# Patient Record
Sex: FEMALE | Race: WHITE | NOT HISPANIC OR LATINO | Employment: OTHER | URBAN - METROPOLITAN AREA
[De-identification: names, ages, dates, MRNs, and addresses within clinical notes are randomized per-mention and may not be internally consistent; named-entity substitution may affect disease eponyms.]

---

## 2019-04-30 ENCOUNTER — HOSPITAL ENCOUNTER (OUTPATIENT)
Dept: LAB | Facility: HOSPITAL | Age: 64
Discharge: HOME | End: 2019-04-30
Attending: SURGERY | Admitting: SURGERY

## 2019-04-30 LAB
ALBUMIN SERPL-MCNC: 4.1 G/DL (ref 3.5–4.8)
ALBUMIN/GLOB SERPL: 1.4 {RATIO} (ref 1–1.7)
ALP SERPL-CCNC: 71 IU/L (ref 32–91)
ALT SERPL-CCNC: 18 IU/L (ref 14–54)
ANION GAP SERPL CALC-SCNC: 16.6 MMOL/L (ref 10–20)
AST SERPL-CCNC: 23 IU/L (ref 15–41)
BASOPHILS # BLD AUTO: 0.2 10*3/UL (ref 0–0.2)
BASOPHILS NFR BLD AUTO: 2 % (ref 0–2)
BILIRUB SERPL-MCNC: 0.6 MG/DL (ref 0.3–1.2)
BUN SERPL-MCNC: 10 MG/DL (ref 8–20)
BUN/CREAT SERPL: 16.7 (ref 5.4–26.2)
CALCIUM SERPL-MCNC: 9.8 MG/DL (ref 8.9–10.3)
CHLORIDE SERPL-SCNC: 104 MMOL/L (ref 101–111)
CHOLEST SERPL-MCNC: 234 MG/DL
CHOLEST/HDLC SERPL: 3.1 {RATIO}
CONV CO2: 23 MMOL/L (ref 22–32)
CONV LDL CHOLESTEROL DIRECT: 137 MG/DL (ref 0–100)
CONV TOTAL PROTEIN: 7.1 G/DL (ref 6.1–7.9)
CREAT UR-MCNC: 0.6 MG/DL (ref 0.4–1)
DIFFERENTIAL METHOD BLD: (no result)
EOSINOPHIL # BLD AUTO: 0.2 10*3/UL (ref 0–0.3)
EOSINOPHIL # BLD AUTO: 3 % (ref 0–3)
ERYTHROCYTE [DISTWIDTH] IN BLOOD BY AUTOMATED COUNT: 13.8 % (ref 11.5–14.5)
GLOBULIN UR ELPH-MCNC: 3 G/DL (ref 2.5–3.8)
GLUCOSE SERPL-MCNC: 95 MG/DL (ref 65–99)
HCT VFR BLD AUTO: 46.8 % (ref 35–49)
HDLC SERPL-MCNC: 75 MG/DL
HGB BLD-MCNC: 15.5 G/DL (ref 12–15)
LDLC/HDLC SERPL: 1.8 {RATIO}
LIPID INTERPRETATION: ABNORMAL
LYMPHOCYTES # BLD AUTO: 2.4 10*3/UL (ref 0.8–4.8)
LYMPHOCYTES NFR BLD AUTO: 31 % (ref 18–42)
MCH RBC QN AUTO: 27.9 PG (ref 26–32)
MCHC RBC AUTO-ENTMCNC: 33 G/DL (ref 32–36)
MCV RBC AUTO: 84.4 FL (ref 80–94)
MONOCYTES # BLD AUTO: 0.7 10*3/UL (ref 0.1–1.3)
MONOCYTES NFR BLD AUTO: 9 % (ref 2–11)
NEUTROPHILS # BLD AUTO: 4.4 10*3/UL (ref 2.3–8.6)
NEUTROPHILS NFR BLD AUTO: 55 % (ref 50–75)
NRBC BLD AUTO-RTO: 0 /100{WBCS}
NRBC/RBC NFR BLD MANUAL: 0 10*3/UL
PLATELET # BLD AUTO: 314 10*3/UL (ref 150–450)
PMV BLD AUTO: 8.9 FL (ref 7.4–10.4)
POTASSIUM SERPL-SCNC: 3.6 MMOL/L (ref 3.6–5.1)
RBC # BLD AUTO: 5.54 10*6/UL (ref 4–5.4)
SODIUM SERPL-SCNC: 140 MMOL/L (ref 136–144)
TRIGL SERPL-MCNC: 90 MG/DL
VLDLC SERPL CALC-MCNC: 22.2 MG/DL
WBC # BLD AUTO: 8 10*3/UL (ref 4.5–11.5)

## 2021-11-22 ENCOUNTER — TRANSCRIBE ORDERS (OUTPATIENT)
Dept: ADMINISTRATIVE | Facility: HOSPITAL | Age: 66
End: 2021-11-22

## 2021-11-22 ENCOUNTER — LAB (OUTPATIENT)
Dept: LAB | Facility: HOSPITAL | Age: 66
End: 2021-11-22

## 2021-11-22 DIAGNOSIS — Z11.52 ENCOUNTER FOR SCREENING FOR SEVERE ACUTE RESPIRATORY SYNDROME CORONAVIRUS 2 (SARS-COV-2) INFECTION: Primary | ICD-10-CM

## 2021-11-22 PROCEDURE — U0004 COV-19 TEST NON-CDC HGH THRU: HCPCS

## 2021-11-22 PROCEDURE — C9803 HOPD COVID-19 SPEC COLLECT: HCPCS

## 2021-11-23 LAB — SARS-COV-2 ORF1AB RESP QL NAA+PROBE: DETECTED

## 2024-12-28 ENCOUNTER — APPOINTMENT (OUTPATIENT)
Dept: CARDIOLOGY | Facility: HOSPITAL | Age: 69
End: 2024-12-28
Payer: MEDICARE

## 2024-12-28 ENCOUNTER — APPOINTMENT (OUTPATIENT)
Dept: GENERAL RADIOLOGY | Facility: HOSPITAL | Age: 69
End: 2024-12-28
Payer: MEDICARE

## 2024-12-28 ENCOUNTER — HOSPITAL ENCOUNTER (INPATIENT)
Facility: HOSPITAL | Age: 69
LOS: 3 days | Discharge: HOME | End: 2025-01-01
Attending: EMERGENCY MEDICINE | Admitting: INTERNAL MEDICINE
Payer: MEDICARE

## 2024-12-28 DIAGNOSIS — J18.9 MULTIFOCAL PNEUMONIA: ICD-10-CM

## 2024-12-28 DIAGNOSIS — I50.31 ACUTE DIASTOLIC CONGESTIVE HEART FAILURE: ICD-10-CM

## 2024-12-28 DIAGNOSIS — I50.9 ACUTE CONGESTIVE HEART FAILURE, UNSPECIFIED HEART FAILURE TYPE: Primary | ICD-10-CM

## 2024-12-28 DIAGNOSIS — I44.7 LBBB (LEFT BUNDLE BRANCH BLOCK): ICD-10-CM

## 2024-12-28 LAB
ALBUMIN SERPL-MCNC: 4.3 G/DL (ref 3.5–5.2)
ALBUMIN/GLOB SERPL: 1.6 G/DL
ALP SERPL-CCNC: 91 U/L (ref 39–117)
ALT SERPL W P-5'-P-CCNC: 14 U/L (ref 1–33)
ANION GAP SERPL CALCULATED.3IONS-SCNC: 10.3 MMOL/L (ref 5–15)
AORTIC DIMENSIONLESS INDEX: 0.19 (DI)
AST SERPL-CCNC: 18 U/L (ref 1–32)
AV MEAN PRESS GRAD SYS DOP V1V2: 45 MMHG
AV VMAX SYS DOP: 447 CM/SEC
BASOPHILS # BLD AUTO: 0.06 10*3/MM3 (ref 0–0.2)
BASOPHILS NFR BLD AUTO: 0.5 % (ref 0–1.5)
BH CV ECHO LEFT VENTRICLE GLOBAL LONGITUDINAL STRAIN: -9.7 %
BH CV ECHO MEAS - ACS: 0.3 CM
BH CV ECHO MEAS - AO MAX PG: 79.9 MMHG
BH CV ECHO MEAS - AO V2 VTI: 87.1 CM
BH CV ECHO MEAS - AVA(I,D): 0.44 CM2
BH CV ECHO MEAS - EDV(CUBED): 132.7 ML
BH CV ECHO MEAS - EDV(MOD-SP2): 91.5 ML
BH CV ECHO MEAS - EDV(MOD-SP4): 79.3 ML
BH CV ECHO MEAS - EF(MOD-SP2): 41.9 %
BH CV ECHO MEAS - EF(MOD-SP4): 40.5 %
BH CV ECHO MEAS - ESV(CUBED): 59.3 ML
BH CV ECHO MEAS - ESV(MOD-SP2): 53.2 ML
BH CV ECHO MEAS - ESV(MOD-SP4): 47.2 ML
BH CV ECHO MEAS - FS: 23.5 %
BH CV ECHO MEAS - IVS/LVPW: 1 CM
BH CV ECHO MEAS - IVSD: 0.8 CM
BH CV ECHO MEAS - LA DIMENSION: 4.3 CM
BH CV ECHO MEAS - LAT PEAK E' VEL: 7.8 CM/SEC
BH CV ECHO MEAS - LV DIASTOLIC VOL/BSA (35-75): 49.7 CM2
BH CV ECHO MEAS - LV MASS(C)D: 140.5 GRAMS
BH CV ECHO MEAS - LV MAX PG: 3.2 MMHG
BH CV ECHO MEAS - LV MEAN PG: 2 MMHG
BH CV ECHO MEAS - LV SYSTOLIC VOL/BSA (12-30): 29.6 CM2
BH CV ECHO MEAS - LV V1 MAX: 88.8 CM/SEC
BH CV ECHO MEAS - LV V1 VTI: 19.1 CM
BH CV ECHO MEAS - LVIDD: 5.1 CM
BH CV ECHO MEAS - LVIDS: 3.9 CM
BH CV ECHO MEAS - LVOT AREA: 2.01 CM2
BH CV ECHO MEAS - LVOT DIAM: 1.6 CM
BH CV ECHO MEAS - LVPWD: 0.8 CM
BH CV ECHO MEAS - MED PEAK E' VEL: 4.6 CM/SEC
BH CV ECHO MEAS - MR MAX PG: 104.4 MMHG
BH CV ECHO MEAS - MR MAX VEL: 511 CM/SEC
BH CV ECHO MEAS - MV A MAX VEL: 104 CM/SEC
BH CV ECHO MEAS - MV DEC SLOPE: 569 CM/SEC2
BH CV ECHO MEAS - MV DEC TIME: 0.12 SEC
BH CV ECHO MEAS - MV E MAX VEL: 134 CM/SEC
BH CV ECHO MEAS - MV E/A: 1.29
BH CV ECHO MEAS - MV MAX PG: 5 MMHG
BH CV ECHO MEAS - MV MEAN PG: 3 MMHG
BH CV ECHO MEAS - MV P1/2T: 61.3 MSEC
BH CV ECHO MEAS - MV V2 VTI: 25.3 CM
BH CV ECHO MEAS - MVA(P1/2T): 3.6 CM2
BH CV ECHO MEAS - MVA(VTI): 1.52 CM2
BH CV ECHO MEAS - PA V2 MAX: 106 CM/SEC
BH CV ECHO MEAS - RAP SYSTOLE: 3 MMHG
BH CV ECHO MEAS - RV MAX PG: 2.5 MMHG
BH CV ECHO MEAS - RV V1 MAX: 79 CM/SEC
BH CV ECHO MEAS - RV V1 VTI: 15.2 CM
BH CV ECHO MEAS - RVSP: 54 MMHG
BH CV ECHO MEAS - SV(LVOT): 38.4 ML
BH CV ECHO MEAS - SV(MOD-SP2): 38.3 ML
BH CV ECHO MEAS - SV(MOD-SP4): 32.1 ML
BH CV ECHO MEAS - SVI(LVOT): 24.1 ML/M2
BH CV ECHO MEAS - SVI(MOD-SP2): 24 ML/M2
BH CV ECHO MEAS - SVI(MOD-SP4): 20.1 ML/M2
BH CV ECHO MEAS - TAPSE (>1.6): 1.99 CM
BH CV ECHO MEAS - TR MAX PG: 51 MMHG
BH CV ECHO MEAS - TR MAX VEL: 357 CM/SEC
BH CV ECHO MEASUREMENTS AVERAGE E/E' RATIO: 21.61
BH CV XLRA - RV BASE: 2.8 CM
BH CV XLRA - RV LENGTH: 6.5 CM
BH CV XLRA - RV MID: 2 CM
BH CV XLRA - TDI S': 10.2 CM/SEC
BILIRUB SERPL-MCNC: 0.6 MG/DL (ref 0–1.2)
BUN SERPL-MCNC: 11 MG/DL (ref 8–23)
BUN/CREAT SERPL: 18.6 (ref 7–25)
CALCIUM SPEC-SCNC: 10.4 MG/DL (ref 8.6–10.5)
CHLORIDE SERPL-SCNC: 103 MMOL/L (ref 98–107)
CHOLEST SERPL-MCNC: 248 MG/DL (ref 0–200)
CO2 SERPL-SCNC: 23.7 MMOL/L (ref 22–29)
CREAT SERPL-MCNC: 0.59 MG/DL (ref 0.57–1)
D DIMER PPP FEU-MCNC: 1.18 MCGFEU/ML (ref 0–0.68)
D-LACTATE SERPL-SCNC: 1 MMOL/L (ref 0.5–2)
DEPRECATED RDW RBC AUTO: 42.3 FL (ref 37–54)
EGFRCR SERPLBLD CKD-EPI 2021: 98.3 ML/MIN/1.73
EOSINOPHIL # BLD AUTO: 0.16 10*3/MM3 (ref 0–0.4)
EOSINOPHIL NFR BLD AUTO: 1.4 % (ref 0.3–6.2)
ERYTHROCYTE [DISTWIDTH] IN BLOOD BY AUTOMATED COUNT: 13.7 % (ref 12.3–15.4)
FLUAV SUBTYP SPEC NAA+PROBE: NOT DETECTED
FLUBV RNA ISLT QL NAA+PROBE: NOT DETECTED
GEN 5 1HR TROPONIN T REFLEX: 17 NG/L
GLOBULIN UR ELPH-MCNC: 2.7 GM/DL
GLUCOSE SERPL-MCNC: 119 MG/DL (ref 65–99)
HBA1C MFR BLD: 5.41 % (ref 4.8–5.6)
HCT VFR BLD AUTO: 42.5 % (ref 34–46.6)
HDLC SERPL-MCNC: 99 MG/DL (ref 40–60)
HGB BLD-MCNC: 13.8 G/DL (ref 12–15.9)
IMM GRANULOCYTES # BLD AUTO: 0.02 10*3/MM3 (ref 0–0.05)
IMM GRANULOCYTES NFR BLD AUTO: 0.2 % (ref 0–0.5)
LDLC SERPL CALC-MCNC: 134 MG/DL (ref 0–100)
LDLC/HDLC SERPL: 1.33 {RATIO}
LEFT ATRIUM VOLUME INDEX: 47.2 ML/M2
LV EF 3D SEGMENTATION: 46 %
LYMPHOCYTES # BLD AUTO: 1.69 10*3/MM3 (ref 0.7–3.1)
LYMPHOCYTES NFR BLD AUTO: 14.4 % (ref 19.6–45.3)
MCH RBC QN AUTO: 27.1 PG (ref 26.6–33)
MCHC RBC AUTO-ENTMCNC: 32.5 G/DL (ref 31.5–35.7)
MCV RBC AUTO: 83.5 FL (ref 79–97)
MONOCYTES # BLD AUTO: 0.87 10*3/MM3 (ref 0.1–0.9)
MONOCYTES NFR BLD AUTO: 7.4 % (ref 5–12)
NEUTROPHILS NFR BLD AUTO: 76.1 % (ref 42.7–76)
NEUTROPHILS NFR BLD AUTO: 8.91 10*3/MM3 (ref 1.7–7)
NT-PROBNP SERPL-MCNC: 3139 PG/ML (ref 0–900)
PLATELET # BLD AUTO: 274 10*3/MM3 (ref 140–450)
PMV BLD AUTO: 10.3 FL (ref 6–12)
POTASSIUM SERPL-SCNC: 3.5 MMOL/L (ref 3.5–5.2)
PROT SERPL-MCNC: 7 G/DL (ref 6–8.5)
QT INTERVAL: 386 MS
QT INTERVAL: 395 MS
QT INTERVAL: 396 MS
QTC INTERVAL: 490 MS
QTC INTERVAL: 505 MS
QTC INTERVAL: 513 MS
RBC # BLD AUTO: 5.09 10*6/MM3 (ref 3.77–5.28)
SARS-COV-2 RNA RESP QL NAA+PROBE: NOT DETECTED
SINUS: 2.7 CM
SODIUM SERPL-SCNC: 137 MMOL/L (ref 136–145)
STJ: 2.5 CM
TRIGL SERPL-MCNC: 87 MG/DL (ref 0–150)
TROPONIN T % DELTA: -11 %
TROPONIN T NUMERIC DELTA: -2 NG/L
TROPONIN T SERPL HS-MCNC: 16 NG/L
TROPONIN T SERPL HS-MCNC: 19 NG/L
TSH SERPL DL<=0.05 MIU/L-ACNC: 1.32 UIU/ML (ref 0.27–4.2)
VLDLC SERPL-MCNC: 15 MG/DL (ref 5–40)
WBC NRBC COR # BLD AUTO: 11.71 10*3/MM3 (ref 3.4–10.8)

## 2024-12-28 PROCEDURE — 84484 ASSAY OF TROPONIN QUANT: CPT | Performed by: EMERGENCY MEDICINE

## 2024-12-28 PROCEDURE — 99285 EMERGENCY DEPT VISIT HI MDM: CPT | Performed by: EMERGENCY MEDICINE

## 2024-12-28 PROCEDURE — 99285 EMERGENCY DEPT VISIT HI MDM: CPT

## 2024-12-28 PROCEDURE — G0378 HOSPITAL OBSERVATION PER HR: HCPCS

## 2024-12-28 PROCEDURE — 87636 SARSCOV2 & INF A&B AMP PRB: CPT | Performed by: EMERGENCY MEDICINE

## 2024-12-28 PROCEDURE — 84443 ASSAY THYROID STIM HORMONE: CPT | Performed by: NURSE PRACTITIONER

## 2024-12-28 PROCEDURE — 25010000002 METHYLPREDNISOLONE PER 125 MG: Performed by: NURSE PRACTITIONER

## 2024-12-28 PROCEDURE — 99204 OFFICE O/P NEW MOD 45 MIN: CPT | Performed by: INTERNAL MEDICINE

## 2024-12-28 PROCEDURE — 83036 HEMOGLOBIN GLYCOSYLATED A1C: CPT | Performed by: NURSE PRACTITIONER

## 2024-12-28 PROCEDURE — 87040 BLOOD CULTURE FOR BACTERIA: CPT | Performed by: EMERGENCY MEDICINE

## 2024-12-28 PROCEDURE — 25010000002 CEFTRIAXONE PER 250 MG: Performed by: EMERGENCY MEDICINE

## 2024-12-28 PROCEDURE — 84484 ASSAY OF TROPONIN QUANT: CPT | Performed by: NURSE PRACTITIONER

## 2024-12-28 PROCEDURE — 83880 ASSAY OF NATRIURETIC PEPTIDE: CPT | Performed by: EMERGENCY MEDICINE

## 2024-12-28 PROCEDURE — 80061 LIPID PANEL: CPT | Performed by: NURSE PRACTITIONER

## 2024-12-28 PROCEDURE — 93005 ELECTROCARDIOGRAM TRACING: CPT | Performed by: EMERGENCY MEDICINE

## 2024-12-28 PROCEDURE — 80053 COMPREHEN METABOLIC PANEL: CPT | Performed by: EMERGENCY MEDICINE

## 2024-12-28 PROCEDURE — 83605 ASSAY OF LACTIC ACID: CPT | Performed by: EMERGENCY MEDICINE

## 2024-12-28 PROCEDURE — 93306 TTE W/DOPPLER COMPLETE: CPT | Performed by: INTERNAL MEDICINE

## 2024-12-28 PROCEDURE — 85379 FIBRIN DEGRADATION QUANT: CPT | Performed by: NURSE PRACTITIONER

## 2024-12-28 PROCEDURE — 36415 COLL VENOUS BLD VENIPUNCTURE: CPT

## 2024-12-28 PROCEDURE — 93010 ELECTROCARDIOGRAM REPORT: CPT | Performed by: INTERNAL MEDICINE

## 2024-12-28 PROCEDURE — 93356 MYOCRD STRAIN IMG SPCKL TRCK: CPT | Performed by: INTERNAL MEDICINE

## 2024-12-28 PROCEDURE — 93005 ELECTROCARDIOGRAM TRACING: CPT | Performed by: FAMILY MEDICINE

## 2024-12-28 PROCEDURE — 85025 COMPLETE CBC W/AUTO DIFF WBC: CPT | Performed by: EMERGENCY MEDICINE

## 2024-12-28 PROCEDURE — 25010000002 ENOXAPARIN PER 10 MG: Performed by: INTERNAL MEDICINE

## 2024-12-28 PROCEDURE — 71046 X-RAY EXAM CHEST 2 VIEWS: CPT

## 2024-12-28 PROCEDURE — 93356 MYOCRD STRAIN IMG SPCKL TRCK: CPT

## 2024-12-28 PROCEDURE — 93306 TTE W/DOPPLER COMPLETE: CPT

## 2024-12-28 PROCEDURE — 25010000002 FUROSEMIDE PER 20 MG: Performed by: NURSE PRACTITIONER

## 2024-12-28 RX ORDER — POLYETHYLENE GLYCOL 3350 17 G/17G
17 POWDER, FOR SOLUTION ORAL DAILY PRN
Status: DISCONTINUED | OUTPATIENT
Start: 2024-12-28 | End: 2025-01-01 | Stop reason: HOSPADM

## 2024-12-28 RX ORDER — ENOXAPARIN SODIUM 100 MG/ML
40 INJECTION SUBCUTANEOUS
Status: DISCONTINUED | OUTPATIENT
Start: 2024-12-28 | End: 2024-12-30

## 2024-12-28 RX ORDER — BISACODYL 5 MG/1
5 TABLET, DELAYED RELEASE ORAL DAILY PRN
Status: DISCONTINUED | OUTPATIENT
Start: 2024-12-28 | End: 2025-01-01 | Stop reason: HOSPADM

## 2024-12-28 RX ORDER — SODIUM CHLORIDE 0.9 % (FLUSH) 0.9 %
10 SYRINGE (ML) INJECTION AS NEEDED
Status: DISCONTINUED | OUTPATIENT
Start: 2024-12-28 | End: 2025-01-01 | Stop reason: HOSPADM

## 2024-12-28 RX ORDER — SODIUM CHLORIDE 0.9 % (FLUSH) 0.9 %
10 SYRINGE (ML) INJECTION EVERY 12 HOURS SCHEDULED
Status: DISCONTINUED | OUTPATIENT
Start: 2024-12-28 | End: 2025-01-01 | Stop reason: HOSPADM

## 2024-12-28 RX ORDER — SODIUM CHLORIDE 9 MG/ML
40 INJECTION, SOLUTION INTRAVENOUS AS NEEDED
Status: DISCONTINUED | OUTPATIENT
Start: 2024-12-28 | End: 2025-01-01 | Stop reason: HOSPADM

## 2024-12-28 RX ORDER — METHYLPREDNISOLONE SODIUM SUCCINATE 125 MG/2ML
60 INJECTION, POWDER, LYOPHILIZED, FOR SOLUTION INTRAMUSCULAR; INTRAVENOUS DAILY
Status: DISCONTINUED | OUTPATIENT
Start: 2024-12-28 | End: 2024-12-29

## 2024-12-28 RX ORDER — FUROSEMIDE 10 MG/ML
40 INJECTION INTRAMUSCULAR; INTRAVENOUS DAILY
Status: DISCONTINUED | OUTPATIENT
Start: 2024-12-29 | End: 2025-01-01 | Stop reason: HOSPADM

## 2024-12-28 RX ORDER — ATORVASTATIN CALCIUM 20 MG/1
20 TABLET, FILM COATED ORAL NIGHTLY
Status: DISCONTINUED | OUTPATIENT
Start: 2024-12-28 | End: 2025-01-01 | Stop reason: HOSPADM

## 2024-12-28 RX ORDER — AMOXICILLIN 250 MG
2 CAPSULE ORAL 2 TIMES DAILY PRN
Status: DISCONTINUED | OUTPATIENT
Start: 2024-12-28 | End: 2025-01-01 | Stop reason: HOSPADM

## 2024-12-28 RX ORDER — PANTOPRAZOLE SODIUM 40 MG/10ML
40 INJECTION, POWDER, LYOPHILIZED, FOR SOLUTION INTRAVENOUS
Status: DISCONTINUED | OUTPATIENT
Start: 2024-12-29 | End: 2025-01-01

## 2024-12-28 RX ORDER — FUROSEMIDE 10 MG/ML
20 INJECTION INTRAMUSCULAR; INTRAVENOUS ONCE
Status: COMPLETED | OUTPATIENT
Start: 2024-12-28 | End: 2024-12-28

## 2024-12-28 RX ORDER — BISACODYL 10 MG
10 SUPPOSITORY, RECTAL RECTAL DAILY PRN
Status: DISCONTINUED | OUTPATIENT
Start: 2024-12-28 | End: 2025-01-01 | Stop reason: HOSPADM

## 2024-12-28 RX ORDER — FUROSEMIDE 10 MG/ML
40 INJECTION INTRAMUSCULAR; INTRAVENOUS EVERY 12 HOURS
Status: DISCONTINUED | OUTPATIENT
Start: 2024-12-28 | End: 2024-12-28

## 2024-12-28 RX ADMIN — METHYLPREDNISOLONE SODIUM SUCCINATE 60 MG: 125 INJECTION, POWDER, FOR SOLUTION INTRAMUSCULAR; INTRAVENOUS at 14:35

## 2024-12-28 RX ADMIN — Medication 5 MG: at 22:19

## 2024-12-28 RX ADMIN — FUROSEMIDE 20 MG: 10 INJECTION, SOLUTION INTRAMUSCULAR; INTRAVENOUS at 14:36

## 2024-12-28 RX ADMIN — Medication 10 ML: at 14:45

## 2024-12-28 RX ADMIN — CEFTRIAXONE 1000 MG: 1 INJECTION, POWDER, FOR SOLUTION INTRAMUSCULAR; INTRAVENOUS at 03:34

## 2024-12-28 RX ADMIN — Medication 10 ML: at 22:19

## 2024-12-28 RX ADMIN — ENOXAPARIN SODIUM 40 MG: 100 INJECTION SUBCUTANEOUS at 22:19

## 2024-12-28 NOTE — ED NOTES
Patient states she has only been taking natural medication and has not taken her bp medication is a long time. She states she has been exposed to pneumonia by close family members and is wanting checked for that

## 2024-12-28 NOTE — FSED PROVIDER NOTE
Subjective   History of Present Illness  69 yof complains of chest pressure, shortness of breath and high blood pressure. The patient reports she noticed she started to feel tired and more short of breath than usual around [place]. She noticed she was retaining fluid in her legs. She has also had a cough. She notes she has been told her blood pressure was high in the past but she does not take anything. The patient has not seen her Doctor for over 3 years.       Review of Systems   Constitutional:  Positive for fatigue.   HENT:  Positive for congestion.    Respiratory:  Positive for cough and shortness of breath.    Cardiovascular:  Positive for chest pain and leg swelling.   All other systems reviewed and are negative.      Past Medical History:   Diagnosis Date    Hypertension        Allergies   Allergen Reactions    Sulfa Antibiotics GI Intolerance    Penicillins Rash       History reviewed. No pertinent surgical history.    History reviewed. No pertinent family history.    Social History     Socioeconomic History    Marital status:    Tobacco Use    Smoking status: Never     Passive exposure: Never    Smokeless tobacco: Never   Vaping Use    Vaping status: Never Used   Substance and Sexual Activity    Alcohol use: Never    Drug use: Never    Sexual activity: Defer           Objective   Physical Exam  Vitals reviewed.   Constitutional:       Appearance: Normal appearance.   HENT:      Head: Normocephalic and atraumatic.   Eyes:      Extraocular Movements: Extraocular movements intact.      Pupils: Pupils are equal, round, and reactive to light.   Cardiovascular:      Rate and Rhythm: Normal rate.      Heart sounds: Murmur heard.   Pulmonary:      Effort: No respiratory distress.      Breath sounds: Rhonchi and rales present.   Musculoskeletal:      Right lower leg: Edema present.      Left lower leg: Edema present.   Skin:     General: Skin is warm and dry.   Neurological:      Mental Status: She is  alert.       ECG 12 Lead Chest Pain      Date/Time: 12/28/2024 6:13 AM    Performed by: Nitesh Puga MD  Authorized by: Nitesh Puga MD               ED Course                                           Medical Decision Making  69 yof complains of chest pain, cough, shortness of breath and fatigue for the past several days. Pt has not seen a Doctor for several years. On exam the patient has a murmur. She also has rales and rhonchi. CXR reported as multifocal pneumonia, however BNP is elevated to >3000. Pt also reports fluid retention. EKG obtained shows LBBB of uncertain chronicity due to no previous EKG available. Plan to admit patient to [facility] for cardiac echo and further evaluation.     Problems Addressed:  Acute congestive heart failure, unspecified heart failure type: complicated acute illness or injury  LBBB (left bundle branch block): complicated acute illness or injury  Multifocal pneumonia: complicated acute illness or injury    Amount and/or Complexity of Data Reviewed  Labs: ordered.  Radiology: ordered.  ECG/medicine tests: ordered and independent interpretation performed.    Risk  Decision regarding hospitalization.        Final diagnoses:   Acute congestive heart failure, unspecified heart failure type   Multifocal pneumonia   LBBB (left bundle branch block)       ED Disposition  ED Disposition       ED Disposition   Decision to Admit    Condition   --    Comment   Level of Care: Telemetry [5]   Diagnosis: CHF (congestive heart failure) [858120]   Admitting Physician: JOSEPH BOLDEN [[id number]]   Attending Physician: NITESH PUGA [[id number]]                 No follow-up provider specified.       Medication List      No changes were made to your prescriptions during this visit.

## 2024-12-28 NOTE — H&P
"[facility] Medicine Services  History & Physical    Patient Name: Rachael Rodriguez  : 1955  MRN: [id number]  Primary Care Physician:  Candy Mayberry MD  Date of admission: 2024  Date and Time of Service: 2024 at 12:59 PM EST      Subjective      Chief Complaint: Shortness of breath    History of Present Illness: Rachael Rodriguez is a 69 y.o. female with no PMH who presented to [facility] on 2024 with shortness of breath.  Patient states that dyspnea has been going on for at least the last week.  And has increasingly became worse.  Patient also with left back pain with no radiation, and feeling bandlike around lower chest.  Patient states that her legs feel heavy\" like mush \"patient does endorse polyuria, fatigue, decreased p.o. intake.  Denies any vomiting,syncope.  Patient was seen and FSED       Review of Systems  Review of Systems   Constitutional:  Positive for fatigue.   HENT:  Positive for congestion.    Respiratory:  Positive for cough and shortness of breath.    Cardiovascular:  Positive for chest pain and leg swelling.   All other systems reviewed and are negative.  Personal History     Past Medical History:   Diagnosis Date    Hypertension        History reviewed. No pertinent surgical history.    Family History: family history is not on file. Otherwise pertinent FHx was reviewed and not pertinent to current issue.    Social History:  reports that she has never smoked. She has never been exposed to tobacco smoke. She has never used smokeless tobacco. She reports that she does not drink alcohol and does not use drugs.    Home Medications:  Prior to Admission Medications       None              Allergies:  Allergies   Allergen Reactions    Sulfa Antibiotics GI Intolerance    Penicillins Rash       Objective      Vitals:   Temp:  [98.4 °F (36.9 °C)-98.6 °F (37 °C)] 98.4 °F (36.9 °C)  Heart Rate:  [] 94  Resp:  [15-22] 15  BP: (144-179)/() 144/82  Body " mass index is 32.68 kg/m².  Physical Exam  Expand All Collapse All       Subjective  History of Present Illness  69 yof complains of chest pressure, shortness of breath and high blood pressure. The patient reports she noticed she started to feel tired and more short of breath than usual around [place]. She noticed she was retaining fluid in her legs. She has also had a cough. She notes she has been told her blood pressure was high in the past but she does not take anything. The patient has not seen her Doctor for over 3 years.         Review of Systems   Constitutional:  Positive for fatigue.   HENT:  Positive for congestion.    Respiratory:  Positive for cough and shortness of breath.    Cardiovascular:  Positive for chest pain and leg swelling.   All other systems reviewed and are negative.        Medical History        Past Medical History:   Diagnosis Date    Hypertension              Allergies        Allergies   Allergen Reactions    Sulfa Antibiotics GI Intolerance    Penicillins Rash            Surgical History   History reviewed. No pertinent surgical history.        History reviewed. No pertinent family history.     Social History   Social History            Socioeconomic History    Marital status:    Tobacco Use    Smoking status: Never       Passive exposure: Never    Smokeless tobacco: Never   Vaping Use    Vaping status: Never Used   Substance and Sexual Activity    Alcohol use: Never    Drug use: Never    Sexual activity: Defer                     Objective[]Expand by Default  Physical Exam  Vitals reviewed.   Constitutional:       Appearance: Normal appearance.  Fatigued  HENT:      Head: Normocephalic and atraumatic.   Eyes:      Extraocular Movements: Extraocular movements intact.      Pupils: Pupils are equal, round, and reactive to light.   Cardiovascular:      Rate and Rhythm: Normal rate.      Heart sounds: Murmur heard.   Pulmonary:      Effort: No respiratory distress.      Breath  sounds: Rhonchi and rales present.   Musculoskeletal:      Right lower leg: Edema present.      Left lower leg: Edema present.   Skin:     General: Skin is warm and dry.   Neurological:      Mental Status: She is alert.      Diagnostic Data:  Lab Results (last 24 hours)       Procedure Component Value Units Date/Time    High Sensitivity Troponin T 1Hr [[id number]]  (Abnormal) Collected: 12/28/24 0336    Specimen: Blood Updated: 12/28/24 0401     HS Troponin T 17 ng/L      Troponin T Numeric Delta -2 ng/L      Troponin T % Delta -11 %     Narrative:      High Sensitive Troponin T Reference Range:  <14.0 ng/L- Negative Female for AMI  <22.0 ng/L- Negative Male for AMI  >=14 - Abnormal Female indicating possible myocardial injury.  >=22 - Abnormal Male indicating possible myocardial injury.   Clinicians would have to utilize clinical acumen, EKG, Troponin, and serial changes to determine if it is an Acute Myocardial Infarction or myocardial injury due to an underlying chronic condition.         Lactic Acid, Plasma [[id number]]  (Normal) Collected: 12/28/24 0311    Specimen: Blood Updated: 12/28/24 0353     Lactate 1.0 mmol/L     Comprehensive Metabolic Panel [[id number]]  (Abnormal) Collected: 12/28/24 0239    Specimen: Blood Updated: 12/28/24 0321     Glucose 119 mg/dL      BUN 11 mg/dL      Creatinine 0.59 mg/dL      Sodium 137 mmol/L      Potassium 3.5 mmol/L      Chloride 103 mmol/L      CO2 23.7 mmol/L      Calcium 10.4 mg/dL      Total Protein 7.0 g/dL      Albumin 4.3 g/dL      ALT (SGPT) 14 U/L      AST (SGOT) 18 U/L      Alkaline Phosphatase 91 U/L      Total Bilirubin 0.6 mg/dL      Globulin 2.7 gm/dL      A/G Ratio 1.6 g/dL      BUN/Creatinine Ratio 18.6     Anion Gap 10.3 mmol/L      eGFR 98.3 mL/min/1.73     Narrative:      GFR Categories in Chronic Kidney Disease (CKD)      GFR Category          GFR (mL/min/1.73)    Interpretation  G1                     90 or greater         Normal or high (1)  G2                       60-89                Mild decrease (1)  G3a                   45-59                Mild to moderate decrease  G3b                   30-44                Moderate to severe decrease  G4                    15-29                Severe decrease  G5                    14 or less           Kidney failure          (1)In the absence of evidence of kidney disease, neither GFR category G1 or G2 fulfill the criteria for CKD.    eGFR calculation 2021 CKD-EPI creatinine equation, which does not include race as a factor    Blood Culture - Blood, Arm, Left [[id number]] Collected: 12/28/24 0318    Specimen: Blood from Arm, Left Updated: 12/28/24 0319    Blood Culture - Blood, Arm, Right [[id number]] Collected: 12/28/24 0311    Specimen: Blood from Arm, Right Updated: 12/28/24 0313    BNP [[id number]]  (Abnormal) Collected: 12/28/24 0239    Specimen: Blood Updated: 12/28/24 0311     proBNP 3,139.0 pg/mL     Narrative:      This assay is used as an aid in the diagnosis of individuals suspected of having heart failure. It can be used as an aid in the diagnosis of acute decompensated heart failure (ADHF) in patients presenting with signs and symptoms of ADHF to the emergency department (ED). In addition, NT-proBNP of <300 pg/mL indicates ADHF is not likely.    Age Range Result Interpretation  NT-proBNP Concentration (pg/mL:      <50             Positive            >450                   Gray                 300-450                    Negative             <300    50-75           Positive            >900                  Gray                300-900                  Negative            <300      >75             Positive            >1800                  Gray                300-1800                  Negative            <300    High Sensitivity Troponin T [[id number]]  (Abnormal) Collected: 12/28/24 0239    Specimen: Blood Updated: 12/28/24 0311     HS Troponin T 19 ng/L     Narrative:      High Sensitive Troponin T Reference  Range:  <14.0 ng/L- Negative Female for AMI  <22.0 ng/L- Negative Male for AMI  >=14 - Abnormal Female indicating possible myocardial injury.  >=22 - Abnormal Male indicating possible myocardial injury.   Clinicians would have to utilize clinical acumen, EKG, Troponin, and serial changes to determine if it is an Acute Myocardial Infarction or myocardial injury due to an underlying chronic condition.         COVID-19 and FLU A/B PCR, 1 HR TAT - Swab, Nasopharynx [[id number]]  (Normal) Collected: 12/28/24 0239    Specimen: Swab from Nasopharynx Updated: 12/28/24 0306     COVID19 Not Detected     Influenza A PCR Not Detected     Influenza B PCR Not Detected    Narrative:      Fact sheet for providers: https://www.fda.gov/media/624007/download    Fact sheet for patients: https://www.fda.gov/media/075452/download    Test performed by PCR.    CBC & Differential [[id number]]  (Abnormal) Collected: 12/28/24 0239    Specimen: Blood Updated: 12/28/24 0248    Narrative:      The following orders were created for panel order CBC & Differential.  Procedure                               Abnormality         Status                     ---------                               -----------         ------                     CBC Auto Differential[[id number]]        Abnormal            Final result                 Please view results for these tests on the individual orders.    CBC Auto Differential [[id number]]  (Abnormal) Collected: 12/28/24 0239    Specimen: Blood Updated: 12/28/24 0248     WBC 11.71 10*3/mm3      RBC 5.09 10*6/mm3      Hemoglobin 13.8 g/dL      Hematocrit 42.5 %      MCV 83.5 fL      MCH 27.1 pg      MCHC 32.5 g/dL      RDW 13.7 %      RDW-SD 42.3 fl      MPV 10.3 fL      Platelets 274 10*3/mm3      Neutrophil % 76.1 %      Lymphocyte % 14.4 %      Monocyte % 7.4 %      Eosinophil % 1.4 %      Basophil % 0.5 %      Immature Grans % 0.2 %      Neutrophils, Absolute 8.91 10*3/mm3      Lymphocytes, Absolute 1.69 10*3/mm3       Monocytes, Absolute 0.87 10*3/mm3      Eosinophils, Absolute 0.16 10*3/mm3      Basophils, Absolute 0.06 10*3/mm3      Immature Grans, Absolute 0.02 10*3/mm3              Imaging Results (Last 24 Hours)       Procedure Component Value Units Date/Time    XR Chest PA & Lateral [[id number]] Collected: 12/28/24 0121     Updated: 12/28/24 0124    Narrative:      XR CHEST PA AND LATERAL    Date of Exam: 12/28/2024 1:15 AM EST    Indication: hypoxia    Comparison: None available.    Findings:  Patchy airspace disease is seen within the lung bases bilaterally, right greater than left. Diffuse interstitial opacities are present.. No pleural fluid. No pneumothorax. The pulmonary vasculature appears within normal limits. The heart appears mildly   enlarged.. No acute osseous abnormality identified. Dextroscoliotic curvature present.      Impression:      Impression:  Multifocal pneumonia, most pronounced within the bilateral lower lobes, right greater than left. Follow-up to resolution recommended.          Electronically Signed: Gillian Lieberman MD    12/28/2024 1:22 AM EST    Workstation ID: [id number]              Assessment & Plan        This is a 69 y.o. female with:    Active and Resolved Problems  Active Hospital Problems    Diagnosis  POA    **CHF (congestive heart failure) [I50.9]  Yes      Resolved Hospital Problems   No resolved problems to display.       New onset congestive heart failure  -EKG: LBBB, ST elevation secondary to ICVD.  Repeat EKG no change.  QTc 505  -BNP: 3139.0  -Troponin 19, 17 respectively.  Trend  -Lasix 20 mg IV x 1  -Echo pending  -Lipid panel pending  -TSH pending  -Cardiology consult    Pneumonia  -CXR showed multifocal pneumonia most pronounced within the bilateral lower lobes.  Right greater than left  -Ceftriaxone started in emergency department continue ceftriaxone  -Encourage I-S  -Solu-Medrol 60 mg daily    VTE Prophylaxis:  Mechanical VTE prophylaxis orders are present.        The  patient desires to be as follows:    CODE STATUS:    Code Status (Patient has no pulse and is not breathing): CPR (Attempt to Resuscitate)  Medical Interventions (Patient has pulse or is breathing): Full Support        Pa Rodriguez, who can be contacted at [phone], is the designated person to make medical decisions on the patient's behalf if She is incapable of doing so. This was clarified with patient and/or next of kin on 12/28/2024 during the course of this H&P.    Admission Status:  I believe this patient meets inpatient status.    Expected Length of Stay: >2    PDMP and Medication Dispenses via Sidebar reviewed and consistent with patient reported medications.    I discussed the patient's findings and my recommendations with patient, family, and nursing staff.      Signature:     This document has been electronically signed by ALEX Culp on December 28, 2024 11:36 [facility] Hospitalist Team

## 2024-12-28 NOTE — ED NOTES
" Rachael Rodriguez    Nursing Report ED to Floor:  Mental status: A&Ox4  Ambulatory status: standby  Oxygen Therapy:  RA  Cardiac Rhythm: SR/ST  Admitted from: home  Safety Concerns:  weakness  Social Issues: none  ED Room #:  JR03    ED Nurse Phone Extension - [phone]      HPI:   Chief Complaint   Patient presents with    Hypertension     C/o shortness of air at times. Has family with pneumonia       Past Medical History:  Past Medical History:   Diagnosis Date    Hypertension         Past Surgical History:  History reviewed. No pertinent surgical history.     Admitting Doctor:   Mitch Saini DO    Consulting Provider(s):  Consults       No orders found from 11/29/2024 to 12/29/2024.             Admitting Diagnosis:   The primary encounter diagnosis was Acute congestive heart failure, unspecified heart failure type. Diagnoses of Multifocal pneumonia and LBBB (left bundle branch block) were also pertinent to this visit.    Most Recent Vitals:   Vitals:    12/28/24 0100 12/28/24 0214 12/28/24 0400   BP: (!) 179/104 153/83 154/87   BP Location:  Right arm    Patient Position:  Sitting Sitting   Pulse: 111 92 91   Resp: 15 16 22   Temp: 98.4 °F (36.9 °C)     SpO2: 99% 96% 93%   Weight: 65.8 kg (145 lb)     Height: 144.8 cm (57\")         Active LDAs/IV Access:   Lines, Drains & Airways       Active LDAs       Name Placement date Placement time Site Days    Peripheral IV 12/28/24 0240 Anterior;Right Forearm 12/28/24  0240  Forearm  less than 1                    Labs (abnormal labs have a star):   Labs Reviewed   BNP (IN-HOUSE) - Abnormal; Notable for the following components:       Result Value    proBNP 3,139.0 (*)     All other components within normal limits    Narrative:     This assay is used as an aid in the diagnosis of individuals suspected of having heart failure. It can be used as an aid in the diagnosis of acute decompensated heart failure (ADHF) in patients presenting with signs and symptoms of ADHF to " the emergency department (ED). In addition, NT-proBNP of <300 pg/mL indicates ADHF is not likely.    Age Range Result Interpretation  NT-proBNP Concentration (pg/mL:      <50             Positive            >450                   Gray                 300-450                    Negative             <300    50-75           Positive            >900                  Gray                300-900                  Negative            <300      >75             Positive            >1800                  Gray                300-1800                  Negative            <300   COMPREHENSIVE METABOLIC PANEL - Abnormal; Notable for the following components:    Glucose 119 (*)     All other components within normal limits    Narrative:     GFR Categories in Chronic Kidney Disease (CKD)      GFR Category          GFR (mL/min/1.73)    Interpretation  G1                     90 or greater         Normal or high (1)  G2                      60-89                Mild decrease (1)  G3a                   45-59                Mild to moderate decrease  G3b                   30-44                Moderate to severe decrease  G4                    15-29                Severe decrease  G5                    14 or less           Kidney failure          (1)In the absence of evidence of kidney disease, neither GFR category G1 or G2 fulfill the criteria for CKD.    eGFR calculation 2021 CKD-EPI creatinine equation, which does not include race as a factor   TROPONIN - Abnormal; Notable for the following components:    HS Troponin T 19 (*)     All other components within normal limits    Narrative:     High Sensitive Troponin T Reference Range:  <14.0 ng/L- Negative Female for AMI  <22.0 ng/L- Negative Male for AMI  >=14 - Abnormal Female indicating possible myocardial injury.  >=22 - Abnormal Male indicating possible myocardial injury.   Clinicians would have to utilize clinical acumen, EKG, Troponin, and serial changes to determine if it is an  Acute Myocardial Infarction or myocardial injury due to an underlying chronic condition.        CBC WITH AUTO DIFFERENTIAL - Abnormal; Notable for the following components:    WBC 11.71 (*)     Neutrophil % 76.1 (*)     Lymphocyte % 14.4 (*)     Neutrophils, Absolute 8.91 (*)     All other components within normal limits   HIGH SENSITIVITIY TROPONIN T 1HR - Abnormal; Notable for the following components:    HS Troponin T 17 (*)     All other components within normal limits    Narrative:     High Sensitive Troponin T Reference Range:  <14.0 ng/L- Negative Female for AMI  <22.0 ng/L- Negative Male for AMI  >=14 - Abnormal Female indicating possible myocardial injury.  >=22 - Abnormal Male indicating possible myocardial injury.   Clinicians would have to utilize clinical acumen, EKG, Troponin, and serial changes to determine if it is an Acute Myocardial Infarction or myocardial injury due to an underlying chronic condition.        COVID-19 AND FLU A/B, NP SWAB IN TRANSPORT MEDIA 1 HR TAT - Normal    Narrative:     Fact sheet for providers: https://www.fda.gov/media/056316/download    Fact sheet for patients: https://www.fda.gov/media/237926/download    Test performed by PCR.   LACTIC ACID, PLASMA - Normal   BLOOD CULTURE   BLOOD CULTURE   CBC AND DIFFERENTIAL    Narrative:     The following orders were created for panel order CBC & Differential.  Procedure                               Abnormality         Status                     ---------                               -----------         ------                     CBC Auto Differential[[id number]]        Abnormal            Final result                 Please view results for these tests on the individual orders.       Meds Given in ED:   Medications   cefTRIAXone (ROCEPHIN) 1,000 mg in sodium chloride 0.9 % 100 mL MBP (1,000 mg Intravenous New Bag 12/28/24 0334)     No current facility-administered medications for this encounter.       Last NIH score:                                                           Dysphagia screening results:        [place] Coma Scale:  No data recorded     CIWA:        Restraint Type:            Isolation Status:  No active isolations

## 2024-12-28 NOTE — Clinical Note
A 7 fr sheath was successfully inserted using micropuncture technique with ultrasound guidance into the right femoral vein. Sheath insertion not delayed.
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
## 2024-12-29 PROBLEM — J18.9 MULTIFOCAL PNEUMONIA: Status: ACTIVE | Noted: 2024-12-28

## 2024-12-29 PROBLEM — I44.7 LBBB (LEFT BUNDLE BRANCH BLOCK): Status: ACTIVE | Noted: 2024-12-28

## 2024-12-29 LAB
ALBUMIN SERPL-MCNC: 4.4 G/DL (ref 3.5–5.2)
ALBUMIN/GLOB SERPL: 1.7 G/DL
ALP SERPL-CCNC: 89 U/L (ref 39–117)
ALT SERPL W P-5'-P-CCNC: 13 U/L (ref 1–33)
ANION GAP SERPL CALCULATED.3IONS-SCNC: 13.6 MMOL/L (ref 5–15)
AST SERPL-CCNC: 24 U/L (ref 1–32)
BILIRUB SERPL-MCNC: 0.5 MG/DL (ref 0–1.2)
BUN SERPL-MCNC: 14 MG/DL (ref 8–23)
BUN/CREAT SERPL: 21.5 (ref 7–25)
CALCIUM SPEC-SCNC: 10.1 MG/DL (ref 8.6–10.5)
CHLORIDE SERPL-SCNC: 102 MMOL/L (ref 98–107)
CO2 SERPL-SCNC: 21.4 MMOL/L (ref 22–29)
CREAT SERPL-MCNC: 0.65 MG/DL (ref 0.57–1)
DEPRECATED RDW RBC AUTO: 40.3 FL (ref 37–54)
EGFRCR SERPLBLD CKD-EPI 2021: 96 ML/MIN/1.73
ERYTHROCYTE [DISTWIDTH] IN BLOOD BY AUTOMATED COUNT: 13.6 % (ref 12.3–15.4)
GLOBULIN UR ELPH-MCNC: 2.6 GM/DL
GLUCOSE SERPL-MCNC: 133 MG/DL (ref 65–99)
HCT VFR BLD AUTO: 41.2 % (ref 34–46.6)
HGB BLD-MCNC: 13.5 G/DL (ref 12–15.9)
LARGE PLATELETS: ABNORMAL
LYMPHOCYTES # BLD MANUAL: 1.8 10*3/MM3 (ref 0.7–3.1)
LYMPHOCYTES NFR BLD MANUAL: 2 % (ref 5–12)
MCH RBC QN AUTO: 26.9 PG (ref 26.6–33)
MCHC RBC AUTO-ENTMCNC: 32.8 G/DL (ref 31.5–35.7)
MCV RBC AUTO: 82.2 FL (ref 79–97)
MONOCYTES # BLD: 0.19 10*3/MM3 (ref 0.1–0.9)
NEUTROPHILS # BLD AUTO: 7.49 10*3/MM3 (ref 1.7–7)
NEUTROPHILS NFR BLD MANUAL: 78 % (ref 42.7–76)
NEUTS BAND NFR BLD MANUAL: 1 % (ref 0–5)
PLATELET # BLD AUTO: 288 10*3/MM3 (ref 140–450)
PMV BLD AUTO: 10.6 FL (ref 6–12)
POTASSIUM SERPL-SCNC: 3.5 MMOL/L (ref 3.5–5.2)
PROT SERPL-MCNC: 7 G/DL (ref 6–8.5)
RBC # BLD AUTO: 5.01 10*6/MM3 (ref 3.77–5.28)
RBC MORPH BLD: NORMAL
SMALL PLATELETS BLD QL SMEAR: ADEQUATE
SODIUM SERPL-SCNC: 137 MMOL/L (ref 136–145)
VARIANT LYMPHS NFR BLD MANUAL: 11 % (ref 19.6–45.3)
VARIANT LYMPHS NFR BLD MANUAL: 8 % (ref 0–5)
WBC MORPH BLD: NORMAL
WBC NRBC COR # BLD AUTO: 9.48 10*3/MM3 (ref 3.4–10.8)

## 2024-12-29 PROCEDURE — 25010000002 METHYLPREDNISOLONE PER 125 MG: Performed by: NURSE PRACTITIONER

## 2024-12-29 PROCEDURE — 85007 BL SMEAR W/DIFF WBC COUNT: CPT | Performed by: NURSE PRACTITIONER

## 2024-12-29 PROCEDURE — 25010000002 CEFTRIAXONE PER 250 MG: Performed by: NURSE PRACTITIONER

## 2024-12-29 PROCEDURE — 25010000002 POTASSIUM CHLORIDE 10 MEQ/100ML SOLUTION: Performed by: INTERNAL MEDICINE

## 2024-12-29 PROCEDURE — 25010000002 ENOXAPARIN PER 10 MG: Performed by: INTERNAL MEDICINE

## 2024-12-29 PROCEDURE — 99231 SBSQ HOSP IP/OBS SF/LOW 25: CPT | Performed by: INTERNAL MEDICINE

## 2024-12-29 PROCEDURE — 25010000002 FUROSEMIDE PER 20 MG: Performed by: NURSE PRACTITIONER

## 2024-12-29 PROCEDURE — 63710000001 PREDNISONE PER 1 MG: Performed by: INTERNAL MEDICINE

## 2024-12-29 PROCEDURE — 80053 COMPREHEN METABOLIC PANEL: CPT | Performed by: NURSE PRACTITIONER

## 2024-12-29 PROCEDURE — 85027 COMPLETE CBC AUTOMATED: CPT | Performed by: NURSE PRACTITIONER

## 2024-12-29 RX ORDER — DIPHENHYDRAMINE HCL 25 MG
50 CAPSULE ORAL ONCE
Status: COMPLETED | OUTPATIENT
Start: 2024-12-30 | End: 2024-12-30

## 2024-12-29 RX ORDER — POTASSIUM CHLORIDE 7.45 MG/ML
10 INJECTION INTRAVENOUS
Status: DISPENSED | OUTPATIENT
Start: 2024-12-29 | End: 2024-12-29

## 2024-12-29 RX ORDER — PREDNISONE 20 MG/1
20 TABLET ORAL
Status: DISCONTINUED | OUTPATIENT
Start: 2024-12-30 | End: 2024-12-29

## 2024-12-29 RX ORDER — POTASSIUM CHLORIDE 1.5 G/1.58G
40 POWDER, FOR SOLUTION ORAL EVERY 4 HOURS
Status: COMPLETED | OUTPATIENT
Start: 2024-12-29 | End: 2024-12-29

## 2024-12-29 RX ORDER — PREDNISONE 20 MG/1
60 TABLET ORAL
Status: COMPLETED | OUTPATIENT
Start: 2024-12-29 | End: 2024-12-30

## 2024-12-29 RX ADMIN — POTASSIUM CHLORIDE 40 MEQ: 1.5 POWDER, FOR SOLUTION ORAL at 12:54

## 2024-12-29 RX ADMIN — METHYLPREDNISOLONE SODIUM SUCCINATE 60 MG: 125 INJECTION, POWDER, FOR SOLUTION INTRAMUSCULAR; INTRAVENOUS at 09:24

## 2024-12-29 RX ADMIN — Medication 5 MG: at 22:06

## 2024-12-29 RX ADMIN — CEFTRIAXONE 1000 MG: 1 INJECTION, POWDER, FOR SOLUTION INTRAMUSCULAR; INTRAVENOUS at 12:54

## 2024-12-29 RX ADMIN — Medication 10 ML: at 22:06

## 2024-12-29 RX ADMIN — ENOXAPARIN SODIUM 40 MG: 100 INJECTION SUBCUTANEOUS at 16:25

## 2024-12-29 RX ADMIN — PANTOPRAZOLE SODIUM 40 MG: 40 INJECTION, POWDER, FOR SOLUTION INTRAVENOUS at 06:35

## 2024-12-29 RX ADMIN — Medication 10 ML: at 09:34

## 2024-12-29 RX ADMIN — POTASSIUM CHLORIDE 40 MEQ: 1.5 POWDER, FOR SOLUTION ORAL at 09:25

## 2024-12-29 RX ADMIN — FUROSEMIDE 40 MG: 10 INJECTION, SOLUTION INTRAMUSCULAR; INTRAVENOUS at 09:24

## 2024-12-29 RX ADMIN — PREDNISONE 60 MG: 20 TABLET ORAL at 22:06

## 2024-12-29 NOTE — PLAN OF CARE
Problem: Adult Inpatient Plan of Care  Goal: Absence of Hospital-Acquired Illness or Injury  Intervention: Identify and Manage Fall Risk  Recent Flowsheet Documentation  Taken 12/29/2024 1400 by Irvin Tejeda RN  Safety Promotion/Fall Prevention: safety round/check completed  Taken 12/29/2024 1200 by Irvin Tejeda RN  Safety Promotion/Fall Prevention: safety round/check completed  Taken 12/29/2024 1000 by Irvin Tejeda RN  Safety Promotion/Fall Prevention: safety round/check completed  Taken 12/29/2024 0800 by Irvin Tejeda RN  Safety Promotion/Fall Prevention: safety round/check completed  Intervention: Prevent Skin Injury  Recent Flowsheet Documentation  Taken 12/29/2024 1600 by Irvin Tejeda RN  Skin Protection: incontinence pads utilized  Taken 12/29/2024 1200 by Irvin Tejeda RN  Skin Protection: incontinence pads utilized  Taken 12/29/2024 0800 by Irvin Tejeda RN  Skin Protection: incontinence pads utilized  Intervention: Prevent Infection  Recent Flowsheet Documentation  Taken 12/29/2024 1600 by Irvin Tejeda RN  Infection Prevention:   environmental surveillance performed   single patient room provided  Taken 12/29/2024 1400 by Irvin Tejeda RN  Infection Prevention:   environmental surveillance performed   single patient room provided  Taken 12/29/2024 1200 by Irvin Tejeda RN  Infection Prevention:   environmental surveillance performed   single patient room provided  Taken 12/29/2024 1000 by Irvin Tejeda RN  Infection Prevention: single patient room provided  Taken 12/29/2024 0800 by Irvin Tejeda RN  Infection Prevention:   environmental surveillance performed   single patient room provided  Goal: Optimal Comfort and Wellbeing  Intervention: Provide Person-Centered Care  Recent Flowsheet Documentation  Taken 12/29/2024 0800 by Irvin Tejeda RN  Trust Relationship/Rapport: care explained   Goal Outcome Evaluation:
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
## 2024-12-29 NOTE — PLAN OF CARE
Problem: Adult Inpatient Plan of Care  Goal: Plan of Care Review  Outcome: Progressing  Flowsheets (Taken 12/29/2024 0533)  Progress: no change  Outcome Evaluation: patient is alert and oriented x4, patient is able to make needs known, patient was on room air but when she fell asleep she was placed on 2L nasal cannula, patient has trouble swallowing any pills, patient needs potassium replaced and will be done IV if patient can tolerate it.  Patient is at bedside and helps patient to bathroom and whatever other needs she may have.  Patient is going to have BENJAMIN monday with heart cath, patient is aware and quite nervous about the procedure.  Plan of Care Reviewed With: patient  Goal: Patient-Specific Goal (Individualized)  Outcome: Progressing  Goal: Absence of Hospital-Acquired Illness or Injury  Outcome: Progressing  Intervention: Identify and Manage Fall Risk  Recent Flowsheet Documentation  Taken 12/29/2024 0400 by Jena Landon RN  Safety Promotion/Fall Prevention: safety round/check completed  Taken 12/29/2024 0200 by Jena Landon RN  Safety Promotion/Fall Prevention: safety round/check completed  Taken 12/29/2024 0000 by Jena Landon RN  Safety Promotion/Fall Prevention: safety round/check completed  Taken 12/28/2024 2200 by Jena Landon RN  Safety Promotion/Fall Prevention: safety round/check completed  Taken 12/28/2024 2000 by Jena Landon RN  Safety Promotion/Fall Prevention: safety round/check completed  Intervention: Prevent Skin Injury  Recent Flowsheet Documentation  Taken 12/29/2024 0400 by Jena Landon RN  Body Position: position changed independently  Skin Protection: transparent dressing maintained  Taken 12/29/2024 0200 by Jena Landon RN  Body Position: position changed independently  Taken 12/29/2024 0000 by Jena Landon RN  Body Position: position changed independently  Skin Protection: transparent dressing maintained  Taken 12/28/2024 2200 by Jena Landon RN  Body  Position: position changed independently  Taken 12/28/2024 2000 by Jena Landon RN  Body Position: position changed independently  Skin Protection: transparent dressing maintained  Intervention: Prevent Infection  Recent Flowsheet Documentation  Taken 12/29/2024 0400 by Jena Landon RN  Infection Prevention: hand hygiene promoted  Taken 12/29/2024 0200 by Jena Landon RN  Infection Prevention: hand hygiene promoted  Taken 12/29/2024 0000 by Jena Landon RN  Infection Prevention: hand hygiene promoted  Taken 12/28/2024 2200 by Jena Landon RN  Infection Prevention: hand hygiene promoted  Taken 12/28/2024 2000 by Jena Landon RN  Infection Prevention: hand hygiene promoted  Goal: Optimal Comfort and Wellbeing  Outcome: Progressing  Intervention: Provide Person-Centered Care  Recent Flowsheet Documentation  Taken 12/29/2024 0400 by Jena Landon RN  Trust Relationship/Rapport:   care explained   choices provided   emotional support provided   empathic listening provided   questions answered   reassurance provided   questions encouraged   thoughts/feelings acknowledged  Taken 12/29/2024 0000 by Jena Landon RN  Trust Relationship/Rapport:   care explained   choices provided   emotional support provided   empathic listening provided   questions answered   questions encouraged   reassurance provided   thoughts/feelings acknowledged  Taken 12/28/2024 2000 by Jena Landon RN  Trust Relationship/Rapport:   care explained   choices provided   emotional support provided   empathic listening provided   questions answered   questions encouraged   reassurance provided   thoughts/feelings acknowledged  Goal: Readiness for Transition of Care  Outcome: Progressing     Problem: Breathing Pattern Ineffective  Goal: Effective Breathing Pattern  Outcome: Progressing  Intervention: Promote Improved Breathing Pattern  Recent Flowsheet Documentation  Taken 12/29/2024 0400 by Jena Landon RN  Head of Bed (HOB)  Positioning:   HOB elevated   HOB at 30 degrees  Taken 12/29/2024 0200 by Jena Landon RN  Head of Bed (HOB) Positioning:   HOB elevated   HOB at 30 degrees  Taken 12/29/2024 0000 by Jena Landon RN  Head of Bed (HOB) Positioning:   HOB elevated   HOB at 30 degrees  Taken 12/28/2024 2200 by Jena Landon RN  Head of Bed (HOB) Positioning:   HOB elevated   HOB at 30 degrees  Taken 12/28/2024 2000 by Jena Landon RN  Head of Bed (HOB) Positioning:   HOB elevated   HOB at 30 degrees   Goal Outcome Evaluation:  Plan of Care Reviewed With: patient        Progress: no change  Outcome Evaluation: patient is alert and oriented x4, patient is able to make needs known, patient was on room air but when she fell asleep she was placed on 2L nasal cannula, patient has trouble swallowing any pills, patient needs potassium replaced and will be done IV if patient can tolerate it.  Patient is at bedside and helps patient to bathroom and whatever other needs she may have.  Patient is going to have BENJAMIN monday with heart cath, patient is aware and quite nervous about the procedure.

## 2024-12-29 NOTE — PROGRESS NOTES
[facility] MEDICINE SERVICE  DAILY PROGRESS NOTE    NAME: Rachael Rodriguez  : 1955  MRN: [id number]      LOS: 0 days     PROVIDER OF SERVICE: Chris Bennett MD    Chief Complaint: CHF (congestive heart failure)    Subjective:     Interval History: Patient states her breathing is much improved today.  She has been urinating quite a bit over the last 24 hours.        Review of Systems:   Review of Systems    Objective:     Vital Signs  Temp:  [98.4 °F (36.9 °C)-99.1 °F (37.3 °C)] 98.7 °F (37.1 °C)  Heart Rate:  [78-94] 78  Resp:  [12-24] 16  BP: (112-155)/(61-82) 119/65   Body mass index is 32.68 kg/m².    Physical Exam  Physical Exam  General Appearance:  Alert, cooperative, no distress, appears stated age  Head:  Normocephalic, without obvious abnormality, atraumatic  Eyes:  PERRL, conjunctiva/corneas clear, EOM's intact, fundi benign, both eyes  Ears:  Normal TM's and external ear canals, both ears  Nose: Nares normal, septum midline, mucosa normal, no drainage or sinus tenderness  Throat: Lips, mucosa, and tongue normal; teeth and gums normal  Neck: Supple, symmetrical, trachea midline, no adenopathy, thyroid: not enlarged, symmetric, no tenderness/mass/nodules, no carotid bruit or JVD  Lungs:   Bibasilar Rales, respirations unlabored  Heart:  Regular rate and rhythm, S1, S2 normal, no murmur, rub or gallop  Abdomen:  Soft, non-tender, bowel sounds active all four quadrants,  no masses, no organomegaly  Extremities: Trace BL LE pitting edema  Pulses: 2+ and symmetric  Skin: Skin color, texture, turgor normal, no rashes or lesions  Neurologic: Normal         Diagnostic Data    Results from last 7 days   Lab Units 24  0403   WBC 10*3/mm3 9.48   HEMOGLOBIN g/dL 13.5   HEMATOCRIT % 41.2   PLATELETS 10*3/mm3 288   GLUCOSE mg/dL 133*   CREATININE mg/dL 0.65   BUN mg/dL 14   SODIUM mmol/L 137   POTASSIUM mmol/L 3.5   AST (SGOT) U/L 24   ALT (SGPT) U/L 13   ALK PHOS U/L 89   BILIRUBIN mg/dL 0.5    ANION GAP mmol/L 13.6       XR Chest PA & Lateral    Result Date: 12/28/2024  Impression: Multifocal pneumonia, most pronounced within the bilateral lower lobes, right greater than left. Follow-up to resolution recommended. Electronically Signed: Gillian Lieberman MD  12/28/2024 1:22 AM EST  Workstation ID: [id number]       I reviewed the patient's new clinical results.    Assessment/Plan:     Active and Resolved Problems  Active Hospital Problems    Diagnosis  POA    **CHF (congestive heart failure) [I50.9]  Yes      Resolved Hospital Problems   No resolved problems to display.       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  -Clinically patient is improving  -Continue IV diuretics  -Monitor I's/O and daily weights  -Echocardiogram shows slightly reduced EF of 46 to 50% as well as diastolic dysfunction  -Appreciate cardiology consult  -Planning for [facility] and [facility] tomorrow for ischemic evaluation    Possible pneumonia  -Chest x-ray also concerning for multifocal pneumonia  -Initiated on IV antibiotic with ceftriaxone  -Was also initiated on IV steroids but I will transition this to a short course of oral prednisone  -Follow-up cultures and de-escalate antibiotics accordingly    Severe AS  -Seen on TTE  -Planning for BENJAMIN on 12/30 with [facility] as well to evaluate for any possible severe coronary artery atherosclerotic disease; if patient requires valve replacement and CABG, she is agreeable to this  -Prevent hypotension in the setting of severe AS    Hypertension  -Patient was severely hypertensive on admission likely related to volume overload although this is now improved  -Continue to monitor    Dyslipidemia  -Initiated on high-dose statin therapy            VTE Prophylaxis:  Pharmacologic & mechanical VTE prophylaxis orders are present.             Disposition Planning:     Barriers to Discharge: Further ischemic workup  Anticipated Date of Discharge: 1/1  Place of Discharge: Home      Time: 40 minutes      Code Status and Medical Interventions: CPR (Attempt to Resuscitate); Full Support   Ordered at: 12/28/24 1132     Code Status (Patient has no pulse and is not breathing):    CPR (Attempt to Resuscitate)     Medical Interventions (Patient has pulse or is breathing):    Full Support       Signature: Electronically signed by Chris Bennett MD, 12/29/24, 12:32 EST.  [facility]

## 2024-12-30 LAB
ANION GAP SERPL CALCULATED.3IONS-SCNC: 11 MMOL/L (ref 5–15)
BASE DEFICIT: ABNORMAL
BASE EXCESS BLDA CALC-SCNC: <0 MMOL/L (ref 0–3)
BASE EXCESS BLDA CALC-SCNC: <0 MMOL/L (ref 0–3)
BASE EXCESS BLDV CALC-SCNC: 0 MMOL/L (ref 0–3)
BASE EXCESS BLDV CALC-SCNC: <0 MMOL/L (ref 0–3)
BUN SERPL-MCNC: 26 MG/DL (ref 8–23)
BUN/CREAT SERPL: 33.8 (ref 7–25)
CA-I BLDA-SCNC: 1.31 MMOL/L (ref 1.12–1.32)
CA-I BLDA-SCNC: 1.32 MMOL/L (ref 1.12–1.32)
CA-I BLDA-SCNC: 1.32 MMOL/L (ref 1.12–1.32)
CA-I BLDA-SCNC: 1.33 MMOL/L (ref 1.12–1.32)
CALCIUM SPEC-SCNC: 9.9 MG/DL (ref 8.6–10.5)
CHLORIDE SERPL-SCNC: 104 MMOL/L (ref 98–107)
CO2 BLDA-SCNC: 24 MMOL/L (ref 23–27)
CO2 BLDA-SCNC: 24 MMOL/L (ref 23–27)
CO2 CONTENT VENOUS: 25 MMOL/L (ref 24–29)
CO2 CONTENT VENOUS: 25 MMOL/L (ref 24–29)
CO2 SERPL-SCNC: 22 MMOL/L (ref 22–29)
CREAT SERPL-MCNC: 0.77 MG/DL (ref 0.57–1)
EGFRCR SERPLBLD CKD-EPI 2021: 84.1 ML/MIN/1.73
GLUCOSE BLDC GLUCOMTR-MCNC: 148 MG/DL (ref 70–105)
GLUCOSE BLDC GLUCOMTR-MCNC: 152 MG/DL (ref 70–105)
GLUCOSE BLDC GLUCOMTR-MCNC: 153 MG/DL (ref 70–105)
GLUCOSE BLDC GLUCOMTR-MCNC: 155 MG/DL (ref 70–105)
GLUCOSE SERPL-MCNC: 140 MG/DL (ref 65–99)
HCO3 BLDA-SCNC: 22.7 MMOL/L (ref 22–26)
HCO3 BLDA-SCNC: 22.8 MMOL/L (ref 22–26)
HCO3 BLDV-SCNC: 24 MMOL/L (ref 23–28)
HCO3 BLDV-SCNC: 24.3 MMOL/L (ref 23–28)
HCT VFR BLDA CALC: 38 % (ref 38–51)
HCT VFR BLDA CALC: 40 % (ref 38–51)
HCT VFR BLDA CALC: 40 % (ref 38–51)
HCT VFR BLDA CALC: 41 % (ref 38–51)
HGB BLDA-MCNC: 12.9 G/DL (ref 12–17)
HGB BLDA-MCNC: 13.6 G/DL (ref 12–17)
HGB BLDA-MCNC: 13.6 G/DL (ref 12–17)
HGB BLDA-MCNC: 13.9 G/DL (ref 12–17)
PCO2 BLDA: 37.9 MM HG (ref 35–45)
PCO2 BLDA: 38 MM HG (ref 35–45)
PCO2 BLDV: 36.6 MM HG (ref 41–51)
PCO2 BLDV: 39.7 MM HG (ref 41–51)
PH BLDA: 7.38 PH UNITS (ref 7.35–7.45)
PH BLDA: 7.38 PH UNITS (ref 7.35–7.45)
PH BLDV: 7.39 PH UNITS (ref 7.31–7.41)
PH BLDV: 7.43 PH UNITS (ref 7.31–7.41)
PO2 BLDA: 56 MM HG (ref 80–105)
PO2 BLDA: 81 MM HG (ref 80–105)
PO2 BLDV: 36 MM HG (ref 35–42)
PO2 BLDV: 37 MM HG (ref 35–42)
POTASSIUM BLDA-SCNC: 4 MMOL/L (ref 3.5–4.9)
POTASSIUM BLDA-SCNC: 4.2 MMOL/L (ref 3.5–4.9)
POTASSIUM SERPL-SCNC: 5 MMOL/L (ref 3.5–5.2)
SAO2 % BLDCOA: 89 % (ref 95–98)
SAO2 % BLDCOA: 96 % (ref 95–98)
SAO2 % BLDCOV: ABNORMAL %
SAO2 % BLDCOV: ABNORMAL %
SODIUM BLD-SCNC: 136 MMOL/L (ref 138–146)
SODIUM BLD-SCNC: 138 MMOL/L (ref 138–146)
SODIUM SERPL-SCNC: 137 MMOL/L (ref 136–145)

## 2024-12-30 PROCEDURE — 82947 ASSAY GLUCOSE BLOOD QUANT: CPT

## 2024-12-30 PROCEDURE — B2111ZZ FLUOROSCOPY OF MULTIPLE CORONARY ARTERIES USING LOW OSMOLAR CONTRAST: ICD-10-PCS | Performed by: INTERNAL MEDICINE

## 2024-12-30 PROCEDURE — 99222 1ST HOSP IP/OBS MODERATE 55: CPT | Performed by: INTERNAL MEDICINE

## 2024-12-30 PROCEDURE — 63710000001 DIPHENHYDRAMINE PER 50 MG: Performed by: INTERNAL MEDICINE

## 2024-12-30 PROCEDURE — 84132 ASSAY OF SERUM POTASSIUM: CPT

## 2024-12-30 PROCEDURE — 80048 BASIC METABOLIC PNL TOTAL CA: CPT | Performed by: INTERNAL MEDICINE

## 2024-12-30 PROCEDURE — C1894 INTRO/SHEATH, NON-LASER: HCPCS | Performed by: INTERNAL MEDICINE

## 2024-12-30 PROCEDURE — 25810000003 SODIUM CHLORIDE 0.9 % SOLUTION: Performed by: INTERNAL MEDICINE

## 2024-12-30 PROCEDURE — 63710000001 PREDNISONE PER 1 MG: Performed by: INTERNAL MEDICINE

## 2024-12-30 PROCEDURE — 25010000002 MIDAZOLAM PER 1 MG: Performed by: INTERNAL MEDICINE

## 2024-12-30 PROCEDURE — 4A023N8 MEASUREMENT OF CARDIAC SAMPLING AND PRESSURE, BILATERAL, PERCUTANEOUS APPROACH: ICD-10-PCS | Performed by: INTERNAL MEDICINE

## 2024-12-30 PROCEDURE — 25010000002 LIDOCAINE 2% SOLUTION: Performed by: INTERNAL MEDICINE

## 2024-12-30 PROCEDURE — 84295 ASSAY OF SERUM SODIUM: CPT

## 2024-12-30 PROCEDURE — C1769 GUIDE WIRE: HCPCS | Performed by: INTERNAL MEDICINE

## 2024-12-30 PROCEDURE — 25010000002 FENTANYL CITRATE (PF) 100 MCG/2ML SOLUTION: Performed by: INTERNAL MEDICINE

## 2024-12-30 PROCEDURE — 25510000001 IOPAMIDOL PER 1 ML: Performed by: INTERNAL MEDICINE

## 2024-12-30 PROCEDURE — 82803 BLOOD GASES ANY COMBINATION: CPT

## 2024-12-30 PROCEDURE — 99222 1ST HOSP IP/OBS MODERATE 55: CPT | Performed by: THORACIC SURGERY (CARDIOTHORACIC VASCULAR SURGERY)

## 2024-12-30 PROCEDURE — 85014 HEMATOCRIT: CPT

## 2024-12-30 PROCEDURE — 82330 ASSAY OF CALCIUM: CPT

## 2024-12-30 PROCEDURE — 93456 R HRT CORONARY ARTERY ANGIO: CPT | Performed by: INTERNAL MEDICINE

## 2024-12-30 PROCEDURE — 99152 MOD SED SAME PHYS/QHP 5/>YRS: CPT | Performed by: INTERNAL MEDICINE

## 2024-12-30 PROCEDURE — 99232 SBSQ HOSP IP/OBS MODERATE 35: CPT | Performed by: INTERNAL MEDICINE

## 2024-12-30 PROCEDURE — 99153 MOD SED SAME PHYS/QHP EA: CPT | Performed by: INTERNAL MEDICINE

## 2024-12-30 RX ORDER — ACETAMINOPHEN 325 MG/1
650 TABLET ORAL EVERY 4 HOURS PRN
Status: DISCONTINUED | OUTPATIENT
Start: 2024-12-30 | End: 2025-01-01 | Stop reason: HOSPADM

## 2024-12-30 RX ORDER — ONDANSETRON 4 MG/1
4 TABLET, ORALLY DISINTEGRATING ORAL EVERY 6 HOURS PRN
Status: DISCONTINUED | OUTPATIENT
Start: 2024-12-30 | End: 2025-01-01 | Stop reason: HOSPADM

## 2024-12-30 RX ORDER — FENTANYL CITRATE 50 UG/ML
INJECTION, SOLUTION INTRAMUSCULAR; INTRAVENOUS
Status: DISCONTINUED | OUTPATIENT
Start: 2024-12-30 | End: 2024-12-30 | Stop reason: HOSPADM

## 2024-12-30 RX ORDER — LIDOCAINE HYDROCHLORIDE 20 MG/ML
INJECTION, SOLUTION INFILTRATION; PERINEURAL
Status: DISCONTINUED | OUTPATIENT
Start: 2024-12-30 | End: 2024-12-30 | Stop reason: HOSPADM

## 2024-12-30 RX ORDER — SODIUM CHLORIDE 9 MG/ML
250 INJECTION, SOLUTION INTRAVENOUS ONCE AS NEEDED
Status: DISPENSED | OUTPATIENT
Start: 2024-12-30 | End: 2024-12-30

## 2024-12-30 RX ORDER — ONDANSETRON 2 MG/ML
4 INJECTION INTRAMUSCULAR; INTRAVENOUS EVERY 6 HOURS PRN
Status: DISCONTINUED | OUTPATIENT
Start: 2024-12-30 | End: 2025-01-01 | Stop reason: HOSPADM

## 2024-12-30 RX ORDER — SODIUM CHLORIDE 9 MG/ML
INJECTION, SOLUTION INTRAVENOUS
Status: COMPLETED | OUTPATIENT
Start: 2024-12-30 | End: 2024-12-30

## 2024-12-30 RX ORDER — NITROGLYCERIN 0.4 MG/1
0.4 TABLET SUBLINGUAL
Status: DISCONTINUED | OUTPATIENT
Start: 2024-12-30 | End: 2025-01-01 | Stop reason: HOSPADM

## 2024-12-30 RX ORDER — IOPAMIDOL 755 MG/ML
INJECTION, SOLUTION INTRAVASCULAR
Status: DISCONTINUED | OUTPATIENT
Start: 2024-12-30 | End: 2024-12-30 | Stop reason: HOSPADM

## 2024-12-30 RX ORDER — MIDAZOLAM HYDROCHLORIDE 1 MG/ML
INJECTION, SOLUTION INTRAMUSCULAR; INTRAVENOUS
Status: DISCONTINUED | OUTPATIENT
Start: 2024-12-30 | End: 2024-12-30 | Stop reason: HOSPADM

## 2024-12-30 RX ADMIN — PREDNISONE 60 MG: 20 TABLET ORAL at 04:24

## 2024-12-30 RX ADMIN — Medication 5 MG: at 23:00

## 2024-12-30 RX ADMIN — Medication 10 ML: at 09:23

## 2024-12-30 RX ADMIN — PREDNISONE 60 MG: 20 TABLET ORAL at 09:22

## 2024-12-30 RX ADMIN — DIPHENHYDRAMINE HYDROCHLORIDE 50 MG: 25 CAPSULE ORAL at 09:22

## 2024-12-30 RX ADMIN — PANTOPRAZOLE SODIUM 40 MG: 40 INJECTION, POWDER, FOR SOLUTION INTRAVENOUS at 06:13

## 2024-12-30 NOTE — CONSULTS
Referring Provider: Chris Bennett MD    Reason for Consultation: Structural heart team consultation for severe symptomatic aortic stenosis      Patient Care Team:  Candy Mayberry MD as PCP - General (Family Medicine)  Provider, No Known as PCP - Family Medicine      SUBJECTIVE     Chief Complaint: Shortness of breath    History of present illness:  Rachael Rodriguez is a 69 y.o. female who has been diagnosed with severe symptomatic aortic stenosis and heart failure with midrange ejection fraction.  Patient has no known medical or cardiac history and she has not been followed up by any PCP.  She presented to the hospital with complaints of worsening dyspnea on exertion.  She denies any chest pain.  She denies dizziness, lightheadedness, palpitations or syncope.  Echo showed EF of 45 to 50% with grade 2 diastolic dysfunction, severe aortic stenosis and moderate mitral valve regurgitation with pulmonary hypertension.  Cardiac catheterization rule out any obstructive coronary disease.  Cardiac surgery and structural heart teams have been consulted for discussions regarding TAVR versus SAVR.        Review of systems:    Constitutional: No weakness, fatigue, fever, rigors, chills   Eyes: No vision changes, eye pain   ENT/oropharynx: No difficulty swallowing, sore throat, epistaxis, changes in hearing   Cardiovascular: No chest pain, chest tightness, palpitations, paroxysmal nocturnal dyspnea, orthopnea, diaphoresis, dizziness / syncopal episode   Respiratory: + shortness of breath, dyspnea on exertion, cough, wheezing, hemoptysis   Gastrointestinal: No abdominal pain, nausea, vomiting, diarrhea, bloody stools   Genitourinary: No hematuria, dysuria   Neurological: No headache, tremors, numbness, one-sided weakness    Musculoskeletal: No cramps, myalgias, joint pain, joint swelling   Integument: No rash, edema        Personal History:      Past Medical History:   Diagnosis Date    Hypertension        History reviewed. No  "pertinent surgical history.    History reviewed. No pertinent family history.    Social History     Tobacco Use    Smoking status: Never     Passive exposure: Never    Smokeless tobacco: Never   Vaping Use    Vaping status: Never Used   Substance Use Topics    Alcohol use: Never    Drug use: Never        Home meds:  Prior to Admission medications    Not on File       Allergies:     Sulfa antibiotics and Penicillins    Scheduled Meds:atorvastatin, 20 mg, Oral, Nightly  furosemide, 40 mg, Intravenous, Daily  pantoprazole, 40 mg, Intravenous, Q AM  sodium chloride, 10 mL, Intravenous, Q12H      Continuous Infusions:   PRN Meds:  acetaminophen    atropine    senna-docusate sodium **AND** polyethylene glycol **AND** bisacodyl **AND** bisacodyl    Calcium Replacement - Follow Nurse / BPA Driven Protocol    Magnesium Standard Dose Replacement - Follow Nurse / BPA Driven Protocol    melatonin    nitroglycerin    ondansetron ODT **OR** ondansetron    Phosphorus Replacement - Follow Nurse / BPA Driven Protocol    Potassium Replacement - Follow Nurse / BPA Driven Protocol    sodium chloride    sodium chloride    sodium chloride      OBJECTIVE    Vital Signs  Vitals:    12/30/24 1130 12/30/24 1145 12/30/24 1200 12/30/24 1242   BP: 144/93 129/78 131/79 135/85   BP Location:    Right arm   Patient Position:    Lying   Pulse: 79 69 70 74   Resp:    18   Temp:       TempSrc:       SpO2: 95% 95% 93% 90%   Weight:       Height:           Flowsheet Rows      Flowsheet Row First Filed Value   Admission Height 144.8 cm (57\") Documented at 12/28/2024 0100   Admission Weight 65.8 kg (145 lb) Documented at 12/28/2024 0100              Intake/Output Summary (Last 24 hours) at 12/30/2024 1442  Last data filed at 12/30/2024 0423  Gross per 24 hour   Intake --   Output 1075 ml   Net -1075 ml        Telemetry: Sinus rhythm, left bundle branch block    Physical Exam:  The patient is alert, oriented and in no distress.  Vital signs as noted " above.  Head and neck revealed no carotid bruits or jugular venous distention.  No thyromegaly or lymphadenopathy is present  Lungs clear.  No wheezing.  Breath sounds are normal bilaterally.  Heart: Normal first and second heart sounds. No murmur.  No precordial rub is present.  No gallop is present.  Abdomen: Soft and nontender.  No organomegaly is present.  Extremities with good peripheral pulses without any pedal edema.  Skin: Warm and dry.  Musculoskeletal system is grossly normal.  CNS grossly normal.       Results Review:  I have personally reviewed the results from the time of this admission to 12/30/2024 14:42 EST and agree with these findings:  []  Laboratory  []  Microbiology  []  Radiology  []  EKG/Telemetry   []  Cardiology/Vascular   []  Pathology  []  Old records  []  Other:    Most notable findings include:     Lab Results (last 24 hours)       Procedure Component Value Units Date/Time    POC Chem 8, arterial (ISTAT) [[id number]]  (Abnormal) Collected: 12/30/24 1034    Specimen: Arterial Blood Updated: 12/30/24 1207     Ionized Calcium 1.32 mmol/L      pH, Arterial 7.380 pH units      pCO2, Arterial 37.9 mm Hg      pO2, Arterial 81.0 mm Hg      HCO3, Arterial 22.7 mmol/L      Base Excess, Arterial <0.0 mmol/L      Base Deficit --     O2 Saturation, Arterial 96.0 %      Glucose 148 mg/dL      Comment: Serial Number: [id number]:  [id number]        Sodium 136 mmol/L      POC Potassium 4.0 mmol/L      Hematocrit 38 %      Hemoglobin 12.9 g/dL      CO2 Content 24 mmol/L     POC Chem 8, arterial (ISTAT) [[id number]]  (Abnormal) Collected: 12/30/24 1027    Specimen: Arterial Blood Updated: 12/30/24 1207     Ionized Calcium 1.33 mmol/L      pH, Arterial 7.380 pH units      pCO2, Arterial 38.0 mm Hg      pO2, Arterial 56.0 mm Hg      HCO3, Arterial 22.8 mmol/L      Base Excess, Arterial <0.0 mmol/L      Base Deficit --     O2 Saturation, Arterial 89.0 %      Glucose 152 mg/dL      Comment: Serial Number:  [id number]:  [id number]        Sodium 138 mmol/L      POC Potassium 4.0 mmol/L      Hematocrit 40 %      Hemoglobin 13.6 g/dL      CO2 Content 24 mmol/L     POC Chem Panel [[id number]]  (Abnormal) Collected: 12/30/24 1023    Specimen: Venous Blood Updated: 12/30/24 1207     Glucose 155 mg/dL      Comment: Serial Number: [id number]:  [id number]        Sodium 138 mmol/L      POC Potassium 4.2 mmol/L      Ionized Calcium 1.32 mmol/L      Hematocrit 41 %      Hemoglobin 13.9 g/dL      pH, Venous 7.430 pH Units      pCO2, Venous 36.6 mm Hg      CO2 CONTENT  25 mmol/L      HCO3, Venous 24.3 mmol/L      Base Excess, Venous 0.0 mmol/L      Base Deficit --     O2 Saturation, Venous --     pO2, Venous 37.0 mm Hg     POC Chem Panel [[id number]]  (Abnormal) Collected: 12/30/24 1024    Specimen: Venous Blood Updated: 12/30/24 1155     Glucose 153 mg/dL      Comment: Serial Number: [id number]:  [id number]        Sodium 138 mmol/L      POC Potassium 4.0 mmol/L      Ionized Calcium 1.31 mmol/L      Hematocrit 40 %      Hemoglobin 13.6 g/dL      pH, Venous 7.390 pH Units      pCO2, Venous 39.7 mm Hg      CO2 CONTENT  25 mmol/L      HCO3, Venous 24.0 mmol/L      Base Excess, Venous <0.0 mmol/L      Base Deficit --     O2 Saturation, Venous --     pO2, Venous 36.0 mm Hg     Basic Metabolic Panel [[id number]]  (Abnormal) Collected: 12/30/24 0423    Specimen: Blood Updated: 12/30/24 0539     Glucose 140 mg/dL      BUN 26 mg/dL      Creatinine 0.77 mg/dL      Sodium 137 mmol/L      Potassium 5.0 mmol/L      Comment: Result checked          Chloride 104 mmol/L      CO2 22.0 mmol/L      Calcium 9.9 mg/dL      BUN/Creatinine Ratio 33.8     Anion Gap 11.0 mmol/L      eGFR 84.1 mL/min/1.73     Narrative:      GFR Categories in Chronic Kidney Disease (CKD)      GFR Category          GFR (mL/min/1.73)    Interpretation  G1                     90 or greater         Normal or high (1)  G2                      60-89                Mild decrease  (1)  G3a                   45-59                Mild to moderate decrease  G3b                   30-44                Moderate to severe decrease  G4                    15-29                Severe decrease  G5                    14 or less           Kidney failure          (1)In the absence of evidence of kidney disease, neither GFR category G1 or G2 fulfill the criteria for CKD.    eGFR calculation 2021 CKD-EPI creatinine equation, which does not include race as a factor    Blood Culture - Blood, Arm, Left [[id number]]  (Normal) Collected: 12/28/24 0318    Specimen: Blood from Arm, Left Updated: 12/30/24 0330     Blood Culture No growth at 2 days    Narrative:      Less than seven (7) mL's of blood was collected.  Insufficient quantity may yield false negative results.    Blood Culture - Blood, Arm, Right [[id number]]  (Normal) Collected: 12/28/24 0311    Specimen: Blood from Arm, Right Updated: 12/30/24 0315     Blood Culture No growth at 2 days            Imaging Results (Last 24 Hours)       ** No results found for the last 24 hours. **            LAB RESULTS (LAST 7 DAYS)    CBC  Results from last 7 days   Lab Units 12/30/24  1034 12/30/24  1027 12/30/24  1024 12/30/24  1023 12/29/24  0403 12/28/24  0239   WBC 10*3/mm3  --   --   --   --  9.48 11.71*   RBC 10*6/mm3  --   --   --   --  5.01 5.09   HEMOGLOBIN g/dL  --   --   --   --  13.5 13.8   HEMOGLOBIN, POC g/dL 12.9 13.6 13.6 13.9  --   --    HEMATOCRIT %  --   --   --   --  41.2 42.5   HEMATOCRIT POC % 38 40 40 41  --   --    MCV fL  --   --   --   --  82.2 83.5   PLATELETS 10*3/mm3  --   --   --   --  288 274       BMP  Results from last 7 days   Lab Units 12/30/24  0423 12/29/24  0403 12/28/24  0239   SODIUM mmol/L 137 137 137   POTASSIUM mmol/L 5.0 3.5 3.5   CHLORIDE mmol/L 104 102 103   CO2 mmol/L 22.0 21.4* 23.7   BUN mg/dL 26* 14 11   CREATININE mg/dL 0.77 0.65 0.59   GLUCOSE mg/dL 140* 133* 119*       CMP   Results from last 7 days   Lab Units  12/30/24  0423 12/29/24  0403 12/28/24  0239   SODIUM mmol/L 137 137 137   POTASSIUM mmol/L 5.0 3.5 3.5   CHLORIDE mmol/L 104 102 103   CO2 mmol/L 22.0 21.4* 23.7   BUN mg/dL 26* 14 11   CREATININE mg/dL 0.77 0.65 0.59   GLUCOSE mg/dL 140* 133* 119*   ALBUMIN g/dL  --  4.4 4.3   BILIRUBIN mg/dL  --  0.5 0.6   ALK PHOS U/L  --  89 91   AST (SGOT) U/L  --  24 18   ALT (SGPT) U/L  --  13 14       BNP        TROPONIN  Results from last 7 days   Lab Units 12/28/24  1412   HSTROP T ng/L 16*       CoAg        Creatinine Clearance  Estimated Creatinine Clearance: 60.4 mL/min (by C-G formula based on SCr of 0.77 mg/dL).    ABG  Results from last 7 days   Lab Units 12/30/24  1034 12/30/24  1027   PH, ARTERIAL pH units 7.380 7.380   PCO2, ARTERIAL mm Hg 37.9 38.0   PO2 ART mm Hg 81.0 56.0*   O2 SATURATION ART % 96.0 89.0*   BASE EXCESS ART mmol/L <0.0* <0.0*         Radiology  No radiology results for the last day      EKG  I personally viewed and interpreted the patient's EKG/Telemetry data:  ECG 12 Lead Chest Pain   Final Result   HEART RATE=98  bpm   RR Mddxeirz=403  ms   MD Jqdslfcp=188  ms   P Horizontal Axis=27  deg   P Front Axis=43  deg   QRSD Lcugycmb=957  ms   QT Pcwhbpow=317  ms   LMkK=417  ms   QRS Axis=45  deg   T Wave Axis=153  deg   - ABNORMAL ECG -   Sinus rhythm   Probable  left atrial enlargement   Left bundle branch block   ST elevation secondary to IVCD   When compared with ECG of 28-Dec-2024 02:43:18,   No significant change   Electronically Signed By: Makayla Driscoll ([facility]) 2024-12-28 19:18:25   Date and Time of Study:2024-12-28 12:04:53      ECG 12 Lead Chest Pain   Final Result   HEART RATE=97  bpm   RR Bhjtwgmu=260  ms   MD Vrhfbufi=261  ms   P Horizontal Axis=33  deg   P Front Axis=44  deg   QRSD Hhlyaxor=036  ms   QT Ensmyuzn=074  ms   INoO=576  ms   QRS Axis=45  deg   T Wave Axis=142  deg   - ABNORMAL ECG -   Sinus rhythm   Probable  left atrial enlargement   Left bundle branch block   ST  elevation secondary to IVCD   Electronically Signed By: Makayla Driscoll ([facility]) 2024-12-28 19:18:21   Date and Time of Study:2024-12-28 12:04:00      ECG 12 Lead Chest Pain   ED Interpretation   Savannah Puga MD     12/28/2024  6:44 AM   ECG 12 Lead Chest Pain         Date/Time: 12/28/2024 6:13 AM      Performed by: Savannah Puga MD   Authorized by: Savannah Puga MD        Final Result   HEART FBMS=560  bpm   RR Kdgvoexr=540  ms   WI Oppwauio=321  ms   P Horizontal Axis=16  deg   P Front Axis=47  deg   QRSD Sedzxqxg=590  ms   QT Mbdubqfx=324  ms   ZNvG=504  ms   QRS Axis=54  deg   T Wave Axis=132  deg   - ABNORMAL ECG -   Sinus tachycardia   Probable  left atrial enlargement   Left bundle branch block   ST elevation secondary to IVCD   Electronically Signed By: Savannah Puga ([facility]) 2024-12-28 07:41:30   Date and Time of Study:2024-12-28 02:43:18            Echocardiogram:    Results for orders placed during the hospital encounter of 12/28/24    Adult Transthoracic Echo Complete W/ Cont if Necessary Per Protocol    Interpretation Summary    Left ventricular ejection fraction appears to be 46 - 50%.    Left ventricular diastolic function is consistent with (grade II w/high LAP) pseudonormalization.    The left atrial cavity is moderate to severely dilated.    Severe aortic valve stenosis is present.    Moderate mitral valve regurgitation is present.    Estimated right ventricular systolic pressure from tricuspid regurgitation is moderately elevated (45-55 mmHg).    Moderate to severe pulmonary hypertension is present.        Stress Test:        Cardiac Catheterization:  Results for orders placed during the hospital encounter of 12/28/24    Cardiac Catheterization/Vascular Study    Conclusion  Table formatting from the original result was not included.  Cardiac Catheterization Operative Report    Rachael BARR Michael  [id number]  12/30/2024  @PCP@    She underwent cardiac  catheterization.    Indications for the procedure include: Valvular heart disease with severe aortic stenosis.    Results for orders placed during the hospital encounter of 12/28/24    Adult Transthoracic Echo Complete W/ Cont if Necessary Per Protocol    Interpretation Summary    Left ventricular ejection fraction appears to be 46 - 50%.    Left ventricular diastolic function is consistent with (grade II w/high LAP) pseudonormalization.    The left atrial cavity is moderate to severely dilated.    Severe aortic valve stenosis is present.    Moderate mitral valve regurgitation is present.    Estimated right ventricular systolic pressure from tricuspid regurgitation is moderately elevated (45-55 mmHg).    Moderate to severe pulmonary hypertension is present.    No results found for this or any previous visit.        Procedure Details:  The risks, benefits, complications, treatment options, and expected outcomes were discussed with the patient. The patient and/or family concurred with the proposed plan, giving informed consent.    After informed consent the patient was brought to the cath lab after appropriate IV hydration was begun and oral premedication was given. She was further sedated with fentanyl. She was prepped and draped in the usual manner. Using the modified Seldinger access technique, a 6 Filipino sheath was placed in the femoral artery. A left heart catheterization with coronary arteriography was performed. Findings are discussed below.    7 Filipino venous sheath was placed in the right femoral vein using ultrasound guidance and then we used a 7 Filipino chest tube Wilton-Oscar catheter to the right heart catheterization and hemodynamic pressure measurements.    After the procedure was completed, sedation was stopped and the sheaths and catheters were all removed. Hemostasis was achieved per established hospital protocols.    Conscious sedation:  Conscious sedation was performed according to protocol.  I  supervised and directed an independent trained observer with the assistance of monitoring the patient's level of consciousness and physiologic status throughout the procedure.  Intraoperative service time was 90 minutes.    Findings:    Hemodynamics Central aortic pressure systolic 146 diastolic 88 with a mean pressure of 150 mmHg      Right heart catheterization with hemodynamics      Pulmonary capillary wedge pressure was 20 mmHg  PA pressure systolic 45 diastolic 28 with a mean pressure of 36 mmHg  RV pressure systolic 40 mmHg  Right atrial pressure was 8 mmHg  PA saturation was 73%  Right atrial saturation was of 69%  Aortic saturation was 96%  Silvia equation cardiac output was 4.3 L/min  Silvia equation cardiac index was 2.6 L/min/m²  Pulmonary vascular resistance was Dynes per second  Systemic vascular resistance was 2002 dynes per second  Left Main Large-caliber vessel angiographically normal bifurcates into left anterior descending and left circumflex arteries  RCA Large-caliber vessel angiographically normal  LAD Large-caliber vessel angiographically normal  Circ Large-caliber vessel angiographically normal  LV Not done  Coronary Dominance Right coronary artery    Estimated Blood Loss:  Minimal    Specimens: None    Complications:  None; patient tolerated the procedure well.    Disposition: PACU - hemodynamically stable.    Condition: stable    Impressions:  Normal coronaries  Moderate pulmonary hypertension    Recommendations:  Further evaluation for TAVR versus surgical AVR        Other:      ASSESSMENT & PLAN:    Principal Problem:    CHF (congestive heart failure)  Active Problems:    Multifocal pneumonia    LBBB (left bundle branch block)    Severe symptomatic aortic stenosis  Echo shows CECILE 0.4 cm², mean/peak gradient 45/80 mmHg.  Cardiac catheterization has ruled out any obstructive coronary disease  Discussed natural history and treatment options of aortic stenosis in details with the  patient.    STS  Isolated AVR   Operative Mortality 4.91%   Morbidity & Mortality 13.3%   Stroke 1.13%   Renal Failure 1.5%   Reoperation 3.84%   Prolonged Ventilation 9.98%   Deep Sternal Wound Infection 0.094%   Long Hospital Stay (>14 days) 6.16%   Short Hospital Stay (<6 days)* 37.3%     The current data from randomized clinical trials suggest that overall TAVR is similar to surgical aortic valve replacement with respect to death or disabling stroke.  Transfemoral TAVR may result in a larger aortic valve, lower death or disabling stroke, lower rates of acute kidney injury, lower rate of atrial fibrillation then surgery however surgical aortic valve replacement may be associated with fever vascular complications and less paravalvular aortic regurgitation.   I have urged her to see cardiac surgery for consultation regarding AVR    Heart failure with midrange ejection fraction  Left bundle branch block  Mitral regurgitation  proBNP of 3000  Echocardiogram shows EF of 45 to 50%.  She has moderate mitral regurgitation.  BENJAMIN to evaluate true severity of mitral regurgitation  She may require mitral and aortic valve surgeries  Started on diuretics  Initiate and uptitrate GDMT as tolerated  Add Toprol-XL and Jardiance  Add ACE I /ARB if blood pressure allows    Hyperlipidemia  Started on high intensity statin  , HDL 99, triglycerides 87 and total cholesterol 248  A1c is 5.4    Hypertension  Start Toprol-XL  Start ACE inhibitor or ARB if blood pressure allows.    Obesity  BMI 32.7.  She weighs 151 pounds.  Lifestyle modifications recommended to the patient    Possible pneumonia  Currently on antibiotics    Moncho Thrasher MD  12/30/24  14:42 EST

## 2024-12-30 NOTE — PROGRESS NOTES
Cardiology [facility]      Patient Care Team:  Candy Myaberry MD as PCP - General (Family Medicine)  Provider, No Known as PCP - Family Medicine        Cardiology assessment and plan     Shortness of breath  Dyspnea on exertion  Acute on chronic diastolic heart failure exacerbation secondary valvular heart disease  Severe aortic stenosis  Moderate mitral regurgitation  Moderate to severe pulmonary hypertension  Volume overload  Likely will benefit from a cardiac catheterization with left and right heart cath and possible BENJAMIN on Monday  Diagnosis and treatment options and risk benefits and alternatives reviewed and discussed with patient  Echocardiogram findings reviewed and discussed with patient  Possible allergy to contrast dye might need prophylaxis  Diagnosis and treatment options and risk benefits and alternatives reviewed and discussed with patient and family  Tmax is 99.2 pulse is 94 respirations are 20 blood pressure is 140/82 sats are 95%  Avoid hypotension  Normal thyroid function  Elevated lipids  Echocardiogram with LV ejection fraction of 45 to 50%  Severe aortic stenosis  Moderate mitral regurgitation  Moderate to severe pulmonary hypertension  Diagnosis and treatment options reviewed and discussed with patient and family  Severe aortic stenosis  Moderate aortic regurgitation  LV ejection fraction of 45 to 50%  Diastolic heart failure  Elevated abnormal proBNP  Plans to proceed with cardiac catheterization with left and right heart catheterization to evaluate the coronary anatomy and also assess the severity of the pulmonary hypertension and the reversibility of the pulmonary hypertension  Likely will benefit from a BENJAMIN to further evaluate the valvular heart disease  Based on the results of the cath and echo findings we will proceed with a further treatment options for the valvular heart disease  Diagnosis and treatment options reviewed and discussed with patient and family  Proceed with a contrast allergy  "prophylaxis  Risk benefits and alternatives reviewed and discussed with patient and family  Scheduled for left and right heart catheterization today  Current medications include Lipitor 20 mg p.o. once a day Lasix 40 mg IV once a day patient is on Lovenox for DVT prophylaxis  Contrast allergy prophylaxis  Further recommendation based on patient course              Chief Complaint: Denies any new complaints    Subjective shortness of breath is somewhat better    Interval History: No significant cardiac arrhythmia    History taken from: patient    Review of Systems:  Review of Systems   Constitutional: Negative for chills, decreased appetite and malaise/fatigue.   HENT:  Negative for congestion and nosebleeds.    Eyes:  Negative for blurred vision and double vision.   Cardiovascular:  Positive for dyspnea on exertion and orthopnea. Negative for chest pain, irregular heartbeat, leg swelling and near-syncope.   Respiratory:  Positive for shortness of breath. Negative for cough.    Hematologic/Lymphatic: Negative for adenopathy. Does not bruise/bleed easily.   Skin:  Negative for color change and rash.   Musculoskeletal:  Negative for back pain and joint pain.   Gastrointestinal:  Negative for bloating, abdominal pain, hematemesis and hematochezia.   Genitourinary:  Negative for flank pain and hematuria.   Neurological:  Negative for dizziness and focal weakness.   Psychiatric/Behavioral:  Negative for altered mental status. The patient does not have insomnia.        Objective    Vital Signs  Visit Vitals  /92   Pulse 93   Temp 98.2 °F (36.8 °C) (Oral)   Resp 18   Ht 144.8 cm (57\")   Wt 68.5 kg (151 lb)   SpO2 95%   BMI 32.68 kg/m²     Oxygen Therapy  SpO2: 95 %  Pulse Oximetry Type: Continuous  Device (Oxygen Therapy): nasal cannula  Flow (L/min) (Oxygen Therapy): 2  Flowsheet Rows      Flowsheet Row First Filed Value   Admission Height 144.8 cm (57\") Documented at 12/28/2024 0100   Admission Weight 65.8 kg (145 " lb) Documented at 12/28/2024 0100          Intake & Output (last 3 days)         12/27 0701  12/28 0700 12/28 0701  12/29 0700 12/29 0701  12/30 0700 12/30 0701  12/31 0700    P.O.   720     Total Intake(mL/kg)   720 (10.5)     Urine (mL/kg/hr)   1925 (1.2)     Total Output   1925     Net   -1205             Urine Unmeasured Occurrence  1 x            Lines, Drains & Airways       Active LDAs       Name Placement date Placement time Site Days    Peripheral IV 12/29/24 2127 Anterior;Left;Proximal Forearm 12/29/24 2127  Forearm  less than 1    Arterial Sheath 6 Fr. Right Femoral 12/30/24  1015  Femoral  less than 1    Venous Sheath 7 Fr. Right Femoral 12/30/24  1017  Femoral  less than 1                    Physical Exam:  Constitutional:       Appearance: Well-developed.   Eyes:      Conjunctiva/sclera: Conjunctivae normal.      Pupils: Pupils are equal, round, and reactive to light.   HENT:      Head: Normocephalic and atraumatic.   Neck:      Thyroid: No thyromegaly.   Pulmonary:      Effort: Pulmonary effort is normal.      Breath sounds: Normal breath sounds. Examination of the right-lower field reveals decreased breath sounds. Examination of the left-lower field reveals decreased breath sounds.   Cardiovascular:      Abnormal PMI. Normal rate. Regular rhythm.   Pulses:     Intact distal pulses.   Edema:     Peripheral edema absent.   Abdominal:      General: Bowel sounds are normal.      Palpations: Abdomen is soft.   Musculoskeletal:      Cervical back: Normal range of motion and neck supple. Skin:     General: Skin is warm.   Neurological:      Mental Status: Alert and oriented to person, place, and time.         Results Review:     I reviewed the patient's new clinical results.    Lab Results (last 24 hours)       Procedure Component Value Units Date/Time    Basic Metabolic Panel [[id number]]  (Abnormal) Collected: 12/30/24 0423    Specimen: Blood Updated: 12/30/24 0539     Glucose 140 mg/dL      BUN 26 mg/dL       Creatinine 0.77 mg/dL      Sodium 137 mmol/L      Potassium 5.0 mmol/L      Comment: Result checked          Chloride 104 mmol/L      CO2 22.0 mmol/L      Calcium 9.9 mg/dL      BUN/Creatinine Ratio 33.8     Anion Gap 11.0 mmol/L      eGFR 84.1 mL/min/1.73     Narrative:      GFR Categories in Chronic Kidney Disease (CKD)      GFR Category          GFR (mL/min/1.73)    Interpretation  G1                     90 or greater         Normal or high (1)  G2                      60-89                Mild decrease (1)  G3a                   45-59                Mild to moderate decrease  G3b                   30-44                Moderate to severe decrease  G4                    15-29                Severe decrease  G5                    14 or less           Kidney failure          (1)In the absence of evidence of kidney disease, neither GFR category G1 or G2 fulfill the criteria for CKD.    eGFR calculation 2021 CKD-EPI creatinine equation, which does not include race as a factor    Blood Culture - Blood, Arm, Left [[id number]]  (Normal) Collected: 12/28/24 0318    Specimen: Blood from Arm, Left Updated: 12/30/24 0330     Blood Culture No growth at 2 days    Narrative:      Less than seven (7) mL's of blood was collected.  Insufficient quantity may yield false negative results.    Blood Culture - Blood, Arm, Right [[id number]]  (Normal) Collected: 12/28/24 0311    Specimen: Blood from Arm, Right Updated: 12/30/24 0315     Blood Culture No growth at 2 days          Results for orders placed during the hospital encounter of 12/28/24    Adult Transthoracic Echo Complete W/ Cont if Necessary Per Protocol    Interpretation Summary    Left ventricular ejection fraction appears to be 46 - 50%.    Left ventricular diastolic function is consistent with (grade II w/high LAP) pseudonormalization.    The left atrial cavity is moderate to severely dilated.    Severe aortic valve stenosis is present.    Moderate mitral valve  regurgitation is present.    Estimated right ventricular systolic pressure from tricuspid regurgitation is moderately elevated (45-55 mmHg).    Moderate to severe pulmonary hypertension is present.        Medication Review:   I have reviewed the patient's current medication list  Scheduled Meds:[Transfer Hold] atorvastatin, 20 mg, Oral, Nightly  [Held by provider] enoxaparin, 40 mg, Subcutaneous, Q24H  [Transfer Hold] furosemide, 40 mg, Intravenous, Daily  [Transfer Hold] pantoprazole, 40 mg, Intravenous, Q AM  [Transfer Hold] sodium chloride, 10 mL, Intravenous, Q12H      Continuous Infusions:   PRN Meds:.  [Transfer Hold] senna-docusate sodium **AND** [Transfer Hold] polyethylene glycol **AND** [Transfer Hold] bisacodyl **AND** [Transfer Hold] bisacodyl    [Transfer Hold] Calcium Replacement - Follow Nurse / BPA Driven Protocol    [Transfer Hold] Magnesium Standard Dose Replacement - Follow Nurse / BPA Driven Protocol    [Transfer Hold] melatonin    [Transfer Hold] Phosphorus Replacement - Follow Nurse / BPA Driven Protocol    Potassium Replacement - Follow Nurse / BPA Driven Protocol    [Transfer Hold] sodium chloride    [Transfer Hold] sodium chloride    ECG/EMG Results (last 24 hours)       ** No results found for the last 24 hours. **            Imaging Results (Last 24 Hours)       ** No results found for the last 24 hours. **          Results for orders placed during the hospital encounter of 12/28/24    Cardiac Catheterization/Vascular Study    Conclusion  Table formatting from the original result was not included.  Cardiac Catheterization Operative Report    Rachael BARR Michael  [id number]  12/30/2024  @PCP@    She underwent cardiac catheterization.    Indications for the procedure include: Valvular heart disease with severe aortic stenosis.    Results for orders placed during the hospital encounter of 12/28/24    Adult Transthoracic Echo Complete W/ Cont if Necessary Per Protocol    Interpretation Summary     Left ventricular ejection fraction appears to be 46 - 50%.    Left ventricular diastolic function is consistent with (grade II w/high LAP) pseudonormalization.    The left atrial cavity is moderate to severely dilated.    Severe aortic valve stenosis is present.    Moderate mitral valve regurgitation is present.    Estimated right ventricular systolic pressure from tricuspid regurgitation is moderately elevated (45-55 mmHg).    Moderate to severe pulmonary hypertension is present.    No results found for this or any previous visit.        Procedure Details:  The risks, benefits, complications, treatment options, and expected outcomes were discussed with the patient. The patient and/or family concurred with the proposed plan, giving informed consent.    After informed consent the patient was brought to the cath lab after appropriate IV hydration was begun and oral premedication was given. She was further sedated with fentanyl. She was prepped and draped in the usual manner. Using the modified Seldinger access technique, a 6 Tunisian sheath was placed in the femoral artery. A left heart catheterization with coronary arteriography was performed. Findings are discussed below.    7 Tunisian venous sheath was placed in the right femoral vein using ultrasound guidance and then we used a 7 Tunisian chest tube Lafayette-Oscar catheter to the right heart catheterization and hemodynamic pressure measurements.    After the procedure was completed, sedation was stopped and the sheaths and catheters were all removed. Hemostasis was achieved per established hospital protocols.    Conscious sedation:  Conscious sedation was performed according to protocol.  I supervised and directed an independent trained observer with the assistance of monitoring the patient's level of consciousness and physiologic status throughout the procedure.  Intraoperative service time was 90 minutes.    Findings:    Hemodynamics Central aortic pressure systolic 146  diastolic 88 with a mean pressure of 150 mmHg      Right heart catheterization with hemodynamics      Pulmonary capillary wedge pressure was 20 mmHg  PA pressure systolic 45 diastolic 28 with a mean pressure of 36 mmHg  RV pressure systolic 40 mmHg  Right atrial pressure was 8 mmHg  PA saturation was 73%  Right atrial saturation was of 69%  Aortic saturation was 96%  Silvia equation cardiac output was 4.3 L/min  Silvia equation cardiac index was 2.6 L/min/m²  Pulmonary vascular resistance was 239 dynes per second  Systemic vascular resistance was 2002 dynes per second  Left Main Large-caliber vessel angiographically normal bifurcates into left anterior descending and left circumflex arteries  RCA Large-caliber vessel angiographically normal  LAD Large-caliber vessel angiographically normal  Circ Large-caliber vessel angiographically normal  LV Not done  Coronary Dominance Right coronary artery    Estimated Blood Loss:  Minimal    Specimens: None    Complications:  None; patient tolerated the procedure well.    Disposition: PACU - hemodynamically stable.    Condition: stable    Impressions:  Normal coronaries  Moderate pulmonary hypertension    Recommendations:  Further evaluation for TAVR versus surgical AVR     Results for orders placed during the hospital encounter of 12/28/24    Adult Transthoracic Echo Complete W/ Cont if Necessary Per Protocol    Interpretation Summary    Left ventricular ejection fraction appears to be 46 - 50%.    Left ventricular diastolic function is consistent with (grade II w/high LAP) pseudonormalization.    The left atrial cavity is moderate to severely dilated.    Severe aortic valve stenosis is present.    Moderate mitral valve regurgitation is present.    Estimated right ventricular systolic pressure from tricuspid regurgitation is moderately elevated (45-55 mmHg).    Moderate to severe pulmonary hypertension is present.     Lab Results   Component Value Date    GLUCOSE 140 (H)  12/30/2024    BUN 26 (H) 12/30/2024    CREATININE 0.77 12/30/2024    EGFR 84.1 12/30/2024    BCR 33.8 (H) 12/30/2024    K 5.0 12/30/2024    CO2 22.0 12/30/2024    CALCIUM 9.9 12/30/2024    ALBUMIN 4.4 12/29/2024    BILITOT 0.5 12/29/2024    AST 24 12/29/2024    ALT 13 12/29/2024      Lab Results   Component Value Date    CHOL 248 (H) 12/28/2024    TRIG 87 12/28/2024    HDL 99 (H) 12/28/2024     (H) 12/28/2024      Lab Results   Component Value Date    TROPONINT 16 (H) 12/28/2024        Assessment & Plan       CHF (congestive heart failure)    Multifocal pneumonia    LBBB (left bundle branch block)        Makayla Driscoll MD  12/30/24  11:05 EST

## 2024-12-30 NOTE — CASE MANAGEMENT/SOCIAL WORK
Continued Stay Note  [facility]     Patient Name: Rachael Rodriguez  MRN: [id number]  Today's Date: 12/30/2024    Admit Date: 12/28/2024    Plan: Needs CM interview   Discharge Plan       Row Name 12/30/24 1056       Plan    Plan Needs CM interview    Plan Comments Barriers:attempted interview and to review IMM. Off the floor for Right and Left Heart Cath                          Edwina QUINONEZ,RN Case Manager  [facility]  Phone: Desk- [phone] cell- [phone]

## 2024-12-30 NOTE — CONSULTS
[facility] Cardiac Surgery      Patient Care Team:  Candy Mayberry MD as PCP - General (Family Medicine)  Provider, No Known as PCP - Family Medicine    Chief complaint  Aortic stenosis    Subjective     History of Present Illness  Patient is a 69 y.o. female but does not routinely take any medications at home.  Presented to the ED with worsening shortness of breath over the last week.  It was worse at night when laying flat.  She became dyspneic with minimal activity.  She also complained of fatigue and lower extrem edema.  She denied chest pain, palpitations, syncope, fever/chills, or nausea/vomiting.  In the ED her troponin was 19 and BNP 3K.  Patient was concern for pneumonia as she recently had a family member with pneumonia.  She was given a steroids and antibiotics that has since been de-escalated.  She had a pronounced murmur and echo was completed.  Echo showed severe aortic stenosis, moderate mitral regurgitation, EF 46 to 50%.  She underwent cardiac catheterization that showed no significant coronary artery disease.  She is referred for surgical evaluation of her valvular heart disease.    Review of Systems   Constitutional:  Positive for fatigue.   Respiratory:  Positive for shortness of breath.    Cardiovascular:  Positive for leg swelling.   All other systems reviewed and are negative.       Past Medical History:   Diagnosis Date    Hypertension      History reviewed. No pertinent surgical history.  History reviewed. No pertinent family history.  Social History     Tobacco Use    Smoking status: Never     Passive exposure: Never    Smokeless tobacco: Never   Vaping Use    Vaping status: Never Used   Substance Use Topics    Alcohol use: Never    Drug use: Never     No medications prior to admission.     atorvastatin, 20 mg, Oral, Nightly  furosemide, 40 mg, Intravenous, Daily  pantoprazole, 40 mg, Intravenous, Q AM  sodium chloride, 10 mL, Intravenous, Q12H      Allergies:  Sulfa antibiotics and  "Penicillins    Objective      Vital Signs  Temp:  [98.2 °F (36.8 °C)-99 °F (37.2 °C)] 98.3 °F (36.8 °C)  Heart Rate:  [] 96  Resp:  [14-24] 18  BP: (127-165)/() 137/83    Flowsheet Rows      Flowsheet Row First Filed Value   Admission Height 144.8 cm (57\") Documented at 12/28/2024 0100   Admission Weight 65.8 kg (145 lb) Documented at 12/28/2024 0100          144.8 cm (57\")    Physical Exam  Constitutional:       General: She is not in acute distress.     Appearance: Normal appearance.   HENT:      Head: Normocephalic and atraumatic.      Mouth/Throat:      Mouth: Mucous membranes are moist.      Pharynx: Oropharynx is clear.   Cardiovascular:      Rate and Rhythm: Normal rate and regular rhythm.      Heart sounds: Murmur heard.   Pulmonary:      Effort: Pulmonary effort is normal. No respiratory distress.   Abdominal:      General: There is distension.      Tenderness: There is no abdominal tenderness.   Musculoskeletal:         General: Swelling present. Normal range of motion.   Skin:     General: Skin is warm and dry.   Neurological:      General: No focal deficit present.      Mental Status: She is alert and oriented to person, place, and time.   Psychiatric:         Mood and Affect: Mood normal.         Behavior: Behavior normal.         Thought Content: Thought content normal.         Judgment: Judgment normal.         Results Review:   Lab Results (last 24 hours)       Procedure Component Value Units Date/Time    POC Chem 8, arterial (ISTAT) [[id number]]  (Abnormal) Collected: 12/30/24 1034    Specimen: Arterial Blood Updated: 12/30/24 1207     Ionized Calcium 1.32 mmol/L      pH, Arterial 7.380 pH units      pCO2, Arterial 37.9 mm Hg      pO2, Arterial 81.0 mm Hg      HCO3, Arterial 22.7 mmol/L      Base Excess, Arterial <0.0 mmol/L      Base Deficit --     O2 Saturation, Arterial 96.0 %      Glucose 148 mg/dL      Comment: Serial Number: [id number]:  [id number]        Sodium 136 mmol/L      POC " Potassium 4.0 mmol/L      Hematocrit 38 %      Hemoglobin 12.9 g/dL      CO2 Content 24 mmol/L     POC Chem 8, arterial (ISTAT) [[id number]]  (Abnormal) Collected: 12/30/24 1027    Specimen: Arterial Blood Updated: 12/30/24 1207     Ionized Calcium 1.33 mmol/L      pH, Arterial 7.380 pH units      pCO2, Arterial 38.0 mm Hg      pO2, Arterial 56.0 mm Hg      HCO3, Arterial 22.8 mmol/L      Base Excess, Arterial <0.0 mmol/L      Base Deficit --     O2 Saturation, Arterial 89.0 %      Glucose 152 mg/dL      Comment: Serial Number: [id number]:  [id number]        Sodium 138 mmol/L      POC Potassium 4.0 mmol/L      Hematocrit 40 %      Hemoglobin 13.6 g/dL      CO2 Content 24 mmol/L     POC Chem Panel [[id number]]  (Abnormal) Collected: 12/30/24 1023    Specimen: Venous Blood Updated: 12/30/24 1207     Glucose 155 mg/dL      Comment: Serial Number: [id number]:  [id number]        Sodium 138 mmol/L      POC Potassium 4.2 mmol/L      Ionized Calcium 1.32 mmol/L      Hematocrit 41 %      Hemoglobin 13.9 g/dL      pH, Venous 7.430 pH Units      pCO2, Venous 36.6 mm Hg      CO2 CONTENT  25 mmol/L      HCO3, Venous 24.3 mmol/L      Base Excess, Venous 0.0 mmol/L      Base Deficit --     O2 Saturation, Venous --     pO2, Venous 37.0 mm Hg     POC Chem Panel [[id number]]  (Abnormal) Collected: 12/30/24 1024    Specimen: Venous Blood Updated: 12/30/24 1155     Glucose 153 mg/dL      Comment: Serial Number: [id number]:  [id number]        Sodium 138 mmol/L      POC Potassium 4.0 mmol/L      Ionized Calcium 1.31 mmol/L      Hematocrit 40 %      Hemoglobin 13.6 g/dL      pH, Venous 7.390 pH Units      pCO2, Venous 39.7 mm Hg      CO2 CONTENT  25 mmol/L      HCO3, Venous 24.0 mmol/L      Base Excess, Venous <0.0 mmol/L      Base Deficit --     O2 Saturation, Venous --     pO2, Venous 36.0 mm Hg     Basic Metabolic Panel [[id number]]  (Abnormal) Collected: 12/30/24 0423    Specimen: Blood Updated: 12/30/24 0539     Glucose 140 mg/dL       BUN 26 mg/dL      Creatinine 0.77 mg/dL      Sodium 137 mmol/L      Potassium 5.0 mmol/L      Comment: Result checked          Chloride 104 mmol/L      CO2 22.0 mmol/L      Calcium 9.9 mg/dL      BUN/Creatinine Ratio 33.8     Anion Gap 11.0 mmol/L      eGFR 84.1 mL/min/1.73     Narrative:      GFR Categories in Chronic Kidney Disease (CKD)      GFR Category          GFR (mL/min/1.73)    Interpretation  G1                     90 or greater         Normal or high (1)  G2                      60-89                Mild decrease (1)  G3a                   45-59                Mild to moderate decrease  G3b                   30-44                Moderate to severe decrease  G4                    15-29                Severe decrease  G5                    14 or less           Kidney failure          (1)In the absence of evidence of kidney disease, neither GFR category G1 or G2 fulfill the criteria for CKD.    eGFR calculation 2021 CKD-EPI creatinine equation, which does not include race as a factor    Blood Culture - Blood, Arm, Left [[id number]]  (Normal) Collected: 12/28/24 0318    Specimen: Blood from Arm, Left Updated: 12/30/24 0330     Blood Culture No growth at 2 days    Narrative:      Less than seven (7) mL's of blood was collected.  Insufficient quantity may yield false negative results.    Blood Culture - Blood, Arm, Right [[id number]]  (Normal) Collected: 12/28/24 0311    Specimen: Blood from Arm, Right Updated: 12/30/24 0315     Blood Culture No growth at 2 days                Assessment & Plan       CHF (congestive heart failure)    Multifocal pneumonia    LBBB (left bundle branch block)      Assessment & Plan    - severe aortic stenosis  - moderate mitral regurg  - PHTN  - acute on chronic diastolic heart failure, HFprEF 45-50%  - HTN  - HLD  - h/o sternal fracture, pt reports enlarged aorta on CT    Patient seen and discussed with Dr. Mcbride.  She presented with shortness of breath and was found to have severe  aortic stenosis and moderate mitral regurg. Cath showed normal coronaries and PHTN. She is scheduled to have BENJAMIN tomorrow for further evaluation.  She reports a history of a sternal fracture and on that CT it showed an enlarged aorta.  Will order a noncontrast chest CT to evaluate. Further plan depending on results of above.      Thank you for allowing us to participate in the care of this patient.      Bakari Solomon PA-C  12/30/24  15:57 EST    Addendum  Patient was seen and examined by me, agree with the findings above.  I have reviewed the echocardiogram, cardiac cath and chest CT and interpreted results myself.  He has severe aortic stenosis and mild to moderate mitral regurgitation.  He has pulmonary hypertension.  I recommend aortic valve replacement to prolong survival, symptomatic relief and to prevent adverse events.  I had discussed the risk (STS calculated), benefits and terms of surgery and she wishes to proceed.  She has a tooth with infection and she will contact her dentist for early repair and we will book surgery as an outpatient after that is taken care of.  I have offered him to see them in office after the dental visit or she has scheduled the surgery at that time.  The office will follow-up with them to schedule surgery based on the dentistry timing  Zac Mcbride MD

## 2024-12-30 NOTE — PROGRESS NOTES
[facility] MEDICINE SERVICE  DAILY PROGRESS NOTE    NAME: Rachael Rodriguez  : 1955  MRN: [id number]      LOS: 1 day     PROVIDER OF SERVICE: Chris Bennett MD    Chief Complaint: CHF (congestive heart failure)    Subjective:     Interval History: Patient continues to feel well, breathing continues to improve.        Review of Systems:   Review of Systems    Objective:     Vital Signs  Temp:  [98.2 °F (36.8 °C)-99 °F (37.2 °C)] 98.2 °F (36.8 °C)  Heart Rate:  [] 73  Resp:  [14-24] 18  BP: (127-165)/() 127/86  Flow (L/min) (Oxygen Therapy):  [2] 2   Body mass index is 32.68 kg/m².    Physical Exam  Physical Exam  General Appearance:  Alert, cooperative, no distress, appears stated age  Head:  Normocephalic, without obvious abnormality, atraumatic  Eyes:  PERRL, conjunctiva/corneas clear, EOM's intact, fundi benign, both eyes  Ears:  Normal TM's and external ear canals, both ears  Nose: Nares normal, septum midline, mucosa normal, no drainage or sinus tenderness  Throat: Lips, mucosa, and tongue normal; teeth and gums normal  Neck: Supple, symmetrical, trachea midline, no adenopathy, thyroid: not enlarged, symmetric, no tenderness/mass/nodules, no carotid bruit or JVD  Lungs:   Very mild rales bilaterally, respirations unlabored  Heart:  Regular rate and rhythm, S1, S2 normal, no murmur, rub or gallop  Abdomen:  Soft, non-tender, bowel sounds active all four quadrants,  no masses, no organomegaly  Extremities: Trace BL LE pitting edema  Pulses: 2+ and symmetric  Skin: Skin color, texture, turgor normal, no rashes or lesions  Neurologic: Normal         Diagnostic Data    Results from last 7 days   Lab Units 24  0423 24  0403   WBC 10*3/mm3  --  9.48   HEMOGLOBIN g/dL  --  13.5   HEMATOCRIT %  --  41.2   PLATELETS 10*3/mm3  --  288   GLUCOSE mg/dL 140* 133*   CREATININE mg/dL 0.77 0.65   BUN mg/dL 26* 14   SODIUM mmol/L 137 137   POTASSIUM mmol/L 5.0 3.5   AST (SGOT) U/L  --  24    ALT (SGPT) U/L  --  13   ALK PHOS U/L  --  89   BILIRUBIN mg/dL  --  0.5   ANION GAP mmol/L 11.0 13.6       No radiology results for the last day      I reviewed the patient's new clinical results.    Assessment/Plan:     Active and Resolved Problems  Active Hospital Problems    Diagnosis  POA    **CHF (congestive heart failure) [I50.9]  Yes    Multifocal pneumonia [J18.9]  Yes    LBBB (left bundle branch block) [I44.7]  Yes      Resolved Hospital Problems   No resolved problems to display.       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  -Clinically patient is improving  -Continue IV diuretics; transition to oral diuretics soon  -Monitor I's/O and daily weights  -Echocardiogram shows slightly reduced EF of 46 to 50% as well as diastolic dysfunction  -Appreciate cardiology consult  -Underwent LHC on 12/30 which showed normal coronary arteries    Possible pneumonia  -Chest x-ray also concerning for multifocal pneumonia  -Initiated on IV antibiotic with ceftriaxone  -Was also initiated on IV steroids but I will transition this to a short course of oral prednisone  -Follow-up cultures and de-escalate antibiotics accordingly    Severe AS  -Seen on TTE  -Underwent LHC on 12/30 which showed no evidence of any coronary artery atherosclerosis  -Planning for BENJAMIN at some point to confirm severe AS and determine whether patient may benefit from TAVR  -Prevent hypotension in the setting of severe AS    Hypertension  -Patient was severely hypertensive on admission likely related to volume overload although this is now improved  -Continue to monitor    Dyslipidemia  -Initiated on high-dose statin therapy            VTE Prophylaxis:  Pharmacologic & mechanical VTE prophylaxis orders are present.             Disposition Planning:     Barriers to Discharge: Further ischemic workup  Anticipated Date of Discharge: 1/1  Place of Discharge: Home      Time: 40 minutes     Code Status and Medical Interventions: CPR  (Attempt to Resuscitate); Full Support   Ordered at: 12/30/24 1124     Level Of Support Discussed With:    Patient     Code Status (Patient has no pulse and is not breathing):    CPR (Attempt to Resuscitate)     Medical Interventions (Patient has pulse or is breathing):    Full Support       Signature: Electronically signed by Chris Bennett MD, 12/30/24, 11:47 EST.  [facility] [place]   Oriented - self; Oriented - place; Oriented - time

## 2024-12-30 NOTE — CASE MANAGEMENT/SOCIAL WORK
Discharge Planning Assessment   [facility]     Patient Name: Rachael Rodriguez  MRN: [id number]  Today's Date: 12/30/2024    Admit Date: 12/28/2024    Plan: Return home with spouse and extended family. No home oxygen and on 2 liters   Discharge Needs Assessment       Row Name 12/30/24 1408       Living Environment    People in Home spouse    Name(s) of People in Home spouse, [place] ; 95 y.o father in law and 27 y.o grandson    Current Living Arrangements home    Potentially Unsafe Housing Conditions none    In the past 12 months has the electric, gas, oil, or water company threatened to shut off services in your home? No    Primary Care Provided by self    Provides Primary Care For no one    Family Caregiver if Needed spouse    Quality of Family Relationships helpful;involved;supportive    Able to Return to Prior Arrangements yes       Resource/Environmental Concerns    Resource/Environmental Concerns none    Transportation Concerns none       Transportation Needs    In the past 12 months, has lack of transportation kept you from medical appointments or from getting medications? no    In the past 12 months, has lack of transportation kept you from meetings, work, or from getting things needed for daily living? No       Food Insecurity    Within the past 12 months, you worried that your food would run out before you got the money to buy more. Never true    Within the past 12 months, the food you bought just didn't last and you didn't have money to get more. Never true       Transition Planning    Patient/Family Anticipates Transition to home with family    Patient/Family Anticipated Services at Transition none    Transportation Anticipated car, drives self;family or friend will provide       Discharge Needs Assessment    Readmission Within the Last 30 Days no previous admission in last 30 days    Equipment Currently Used at Home none    Concerns to be Addressed no discharge needs identified    Do you want help finding or  keeping work or a job? I do not need or want help    Do you want help with school or training? For example, starting or completing job training or getting a high school diploma, GED or equivalent No    Anticipated Changes Related to Illness none    Equipment Needed After Discharge none                   Discharge Plan       Row Name 12/30/24 1410       Plan    Plan Return home with spouse and extended family. No home oxygen and on 2 liters    Patient/Family in Agreement with Plan yes    Plan Comments CM met with Mrs. Rodriguez with  at bedside. She is a/o and lives with spouse, father in law and adult independent grandson. She drives and is independent and does not use any medical equipment. She said her PCP is Dr. Nima Cardona in [place], [place] but she has not seen him yet. CM encouraged her  to contact Dr. Cardona's office and make a follow up appointment for next week. She said she is holistic and does not take any medications. PHamacy confirmed. She denied any financial concerns.                       Demographic Summary       Row Name 12/30/24 1406       General Information    Admission Type inpatient    Arrived From emergency department    Required Notices Provided Important Message from Medicare    Referral Source admission list    Reason for Consult discharge planning    Preferred Language English       Contact Information    Permission Granted to Share Info With                    Functional Status       Row Name 12/30/24 1407       Functional Status    Usual Activity Tolerance good    Current Activity Tolerance moderate       Functional Status, IADL    Medications independent    Meal Preparation independent    Housekeeping independent    Laundry independent    Shopping independent    If for any reason you need help with day-to-day activities such as bathing, preparing meals, shopping, managing finances, etc., do you get the help you need? I don't need any help       Mental Status     General Appearance WDL WDL       Mental Status Summary    Recent Changes in Mental Status/Cognitive Functioning no changes       Employment/    Employment Status retired                          Patient Forms       Row Name 12/30/24 9805       Patient Forms    Important Message from Medicare (IMM) Delivered  IMM signed by patient with CM 12/30    Delivered to Patient    Method of delivery In person                      Edwina QUINONEZ,RN Case Manager  [facility]  Phone: Desk- [phone] cell- [phone]

## 2024-12-30 NOTE — PLAN OF CARE
Problem: Adult Inpatient Plan of Care  Goal: Plan of Care Review  Outcome: Progressing  Flowsheets (Taken 12/30/2024 0600)  Progress: no change  Outcome Evaluation: Patient is alert and oriented, patient is able to make needs known, patient is on 2L nasal cannula, patient has been NPO since midnight except for taking pills with her peaches which was okayed by Dr. Driscoll per this nurse.  Patient is to have a BENJAMIN and a heart cath today, RN will have patient sign consents.  Plan of Care Reviewed With: patient  Goal: Patient-Specific Goal (Individualized)  Outcome: Progressing  Goal: Absence of Hospital-Acquired Illness or Injury  Outcome: Progressing  Intervention: Identify and Manage Fall Risk  Recent Flowsheet Documentation  Taken 12/30/2024 0400 by Jena Landon RN  Safety Promotion/Fall Prevention: safety round/check completed  Taken 12/30/2024 0000 by Jena Landon RN  Safety Promotion/Fall Prevention: safety round/check completed  Taken 12/29/2024 2200 by Jena Landon RN  Safety Promotion/Fall Prevention:   activity supervised   assistive device/personal items within reach   clutter free environment maintained   safety round/check completed  Taken 12/29/2024 2000 by Jena Landon RN  Safety Promotion/Fall Prevention: assistive device/personal items within reach  Intervention: Prevent Skin Injury  Recent Flowsheet Documentation  Taken 12/30/2024 0400 by Jena Landon RN  Body Position: position changed independently  Taken 12/30/2024 0000 by Jena Landon RN  Body Position: position changed independently  Skin Protection: incontinence pads utilized  Taken 12/29/2024 2200 by Jena Landon RN  Body Position: position changed independently  Taken 12/29/2024 2000 by Jena Landon RN  Body Position: position changed independently  Skin Protection: incontinence pads utilized  Intervention: Prevent Infection  Recent Flowsheet Documentation  Taken 12/30/2024 0400 by Jena Landon RN  Infection  Prevention: hand hygiene promoted  Taken 12/30/2024 0000 by Jena Landon RN  Infection Prevention: hand hygiene promoted  Taken 12/29/2024 2200 by Jena Landon RN  Infection Prevention: hand hygiene promoted  Taken 12/29/2024 2000 by Jena Landon RN  Infection Prevention: hand hygiene promoted  Goal: Optimal Comfort and Wellbeing  Outcome: Progressing  Intervention: Provide Person-Centered Care  Recent Flowsheet Documentation  Taken 12/30/2024 0400 by Jena Landon RN  Trust Relationship/Rapport:   care explained   choices provided   emotional support provided   empathic listening provided   questions answered   questions encouraged   reassurance provided   thoughts/feelings acknowledged  Taken 12/30/2024 0000 by Jena Landon RN  Trust Relationship/Rapport:   care explained   choices provided   emotional support provided   empathic listening provided   questions answered   questions encouraged   reassurance provided   thoughts/feelings acknowledged  Taken 12/29/2024 2000 by Jena Landon RN  Trust Relationship/Rapport:   care explained   choices provided   emotional support provided   empathic listening provided   questions answered   questions encouraged   reassurance provided   thoughts/feelings acknowledged  Goal: Readiness for Transition of Care  Outcome: Progressing     Problem: Breathing Pattern Ineffective  Goal: Effective Breathing Pattern  Outcome: Progressing  Intervention: Promote Improved Breathing Pattern  Recent Flowsheet Documentation  Taken 12/30/2024 0400 by Jena Landon RN  Supportive Measures: active listening utilized  Head of Bed (HOB) Positioning:   HOB elevated   HOB at 30 degrees  Taken 12/30/2024 0000 by Jena Landon RN  Supportive Measures: active listening utilized  Head of Bed (HOB) Positioning:   HOB elevated   HOB at 30 degrees  Taken 12/29/2024 2200 by Jena Landon RN  Head of Bed (HOB) Positioning:   HOB elevated   HOB at 30 degrees  Taken 12/29/2024 2000 by  Jena Landon, RN  Supportive Measures: active listening utilized  Head of Bed (HOB) Positioning:   HOB elevated   HOB at 30 degrees   Goal Outcome Evaluation:  Plan of Care Reviewed With: patient        Progress: no change  Outcome Evaluation: Patient is alert and oriented, patient is able to make needs known, patient is on 2L nasal cannula, patient has been NPO since midnight except for taking pills with her peaches which was okayed by Dr. Driscoll per this nurse.  Patient is to have a BENJAMIN and a heart cath today, RN will have patient sign consents.

## 2024-12-31 ENCOUNTER — ANESTHESIA EVENT (OUTPATIENT)
Dept: CARDIOLOGY | Facility: HOSPITAL | Age: 69
End: 2024-12-31
Payer: MEDICARE

## 2024-12-31 ENCOUNTER — APPOINTMENT (OUTPATIENT)
Dept: CT IMAGING | Facility: HOSPITAL | Age: 69
End: 2024-12-31
Payer: MEDICARE

## 2024-12-31 ENCOUNTER — ANESTHESIA (OUTPATIENT)
Dept: CARDIOLOGY | Facility: HOSPITAL | Age: 69
End: 2024-12-31
Payer: MEDICARE

## 2024-12-31 ENCOUNTER — APPOINTMENT (OUTPATIENT)
Dept: CARDIOLOGY | Facility: HOSPITAL | Age: 69
End: 2024-12-31
Payer: MEDICARE

## 2024-12-31 LAB — BH CV ECHO SHUNT ASSESSMENT PERFORMED (HIDDEN SCRIPTING): 1

## 2024-12-31 PROCEDURE — 25810000003 SODIUM CHLORIDE 0.9 % SOLUTION: Performed by: ANESTHESIOLOGIST ASSISTANT

## 2024-12-31 PROCEDURE — 25010000002 FUROSEMIDE PER 20 MG: Performed by: INTERNAL MEDICINE

## 2024-12-31 PROCEDURE — 93325 DOPPLER ECHO COLOR FLOW MAPG: CPT | Performed by: STUDENT IN AN ORGANIZED HEALTH CARE EDUCATION/TRAINING PROGRAM

## 2024-12-31 PROCEDURE — 71250 CT THORAX DX C-: CPT

## 2024-12-31 PROCEDURE — 93321 DOPPLER ECHO F-UP/LMTD STD: CPT | Performed by: STUDENT IN AN ORGANIZED HEALTH CARE EDUCATION/TRAINING PROGRAM

## 2024-12-31 PROCEDURE — 93321 DOPPLER ECHO F-UP/LMTD STD: CPT

## 2024-12-31 PROCEDURE — 25010000002 PROPOFOL 10 MG/ML EMULSION: Performed by: ANESTHESIOLOGIST ASSISTANT

## 2024-12-31 PROCEDURE — 99232 SBSQ HOSP IP/OBS MODERATE 35: CPT | Performed by: INTERNAL MEDICINE

## 2024-12-31 PROCEDURE — 93325 DOPPLER ECHO COLOR FLOW MAPG: CPT

## 2024-12-31 PROCEDURE — 93312 ECHO TRANSESOPHAGEAL: CPT | Performed by: STUDENT IN AN ORGANIZED HEALTH CARE EDUCATION/TRAINING PROGRAM

## 2024-12-31 PROCEDURE — 93312 ECHO TRANSESOPHAGEAL: CPT

## 2024-12-31 RX ORDER — SODIUM CHLORIDE 9 MG/ML
INJECTION, SOLUTION INTRAVENOUS CONTINUOUS PRN
Status: DISCONTINUED | OUTPATIENT
Start: 2024-12-31 | End: 2024-12-31 | Stop reason: SURG

## 2024-12-31 RX ORDER — METOPROLOL SUCCINATE 25 MG/1
12.5 TABLET, EXTENDED RELEASE ORAL
Status: DISCONTINUED | OUTPATIENT
Start: 2024-12-31 | End: 2025-01-01 | Stop reason: HOSPADM

## 2024-12-31 RX ADMIN — PANTOPRAZOLE SODIUM 40 MG: 40 INJECTION, POWDER, FOR SOLUTION INTRAVENOUS at 05:15

## 2024-12-31 RX ADMIN — Medication 10 ML: at 09:00

## 2024-12-31 RX ADMIN — FUROSEMIDE 40 MG: 10 INJECTION, SOLUTION INTRAMUSCULAR; INTRAVENOUS at 09:00

## 2024-12-31 RX ADMIN — SODIUM CHLORIDE: 9 INJECTION, SOLUTION INTRAVENOUS at 10:17

## 2024-12-31 RX ADMIN — PROPOFOL 100 MCG/KG/MIN: 10 INJECTION, EMULSION INTRAVENOUS at 10:17

## 2024-12-31 RX ADMIN — METOPROLOL SUCCINATE 12.5 MG: 25 TABLET, EXTENDED RELEASE ORAL at 17:52

## 2024-12-31 NOTE — PROGRESS NOTES
" LOS: 2 days   Patient Care Team:  Candy Mayberry MD as PCP - General (Family Medicine)  Provider, No Known as PCP - Family Medicine    Chief Complaint: shortness of breath    Subjective  Patient has no complaints at this time, anxious for the BENJAMIN today    Vital Signs  Temp:  [97.6 °F (36.4 °C)-98.5 °F (36.9 °C)] 98.5 °F (36.9 °C)  Heart Rate:  [69-96] 80  Resp:  [16-20] 17  BP: (127-149)/(78-93) 143/93  Body mass index is 32.68 kg/m².    Intake/Output Summary (Last 24 hours) at 12/31/2024 1013  Last data filed at 12/31/2024 1000  Gross per 24 hour   Intake --   Output 2300 ml   Net -2300 ml     I/O this shift:  In: -   Out: 1100 [Urine:1100]            12/28/24  0100 12/28/24  0626   Weight: 65.8 kg (145 lb) 68.5 kg (151 lb)         Objective:  General Appearance:  In no acute distress, not in pain and comfortable.    Vital signs: (most recent): Blood pressure 133/80, pulse 75, temperature 97.6 °F (36.4 °C), temperature source Oral, resp. rate 16, height 144.8 cm (57\"), weight 68.5 kg (151 lb), SpO2 97%.  Vital signs are normal.    Output: Producing urine.    Lungs:  Normal effort.    Heart: Normal rate.  Regular rhythm.  Positive for murmur.    Abdomen: Abdomen is soft.  There is no abdominal tenderness.     Extremities: Normal range of motion.    Pulses: Distal pulses are intact.    Neurological: Patient is alert and oriented to person, place and time.  Normal strength.    Pupils:  Pupils are equal, round, and reactive to light.    Skin:  Warm and dry.                Results Review:        WBC WBC   Date Value Ref Range Status   12/29/2024 9.48 3.40 - 10.80 10*3/mm3 Final      HGB Hemoglobin   Date Value Ref Range Status   12/30/2024 12.9 12.0 - 17.0 g/dL Final   12/30/2024 13.6 12.0 - 17.0 g/dL Final   12/30/2024 13.6 12.0 - 17.0 g/dL Final   12/30/2024 13.9 12.0 - 17.0 g/dL Final   12/29/2024 13.5 12.0 - 15.9 g/dL Final      HCT Hematocrit   Date Value Ref Range Status   12/30/2024 38 38 - 51 % Final " "  12/30/2024 40 38 - 51 % Final   12/30/2024 40 38 - 51 % Final   12/30/2024 41 38 - 51 % Final   12/29/2024 41.2 34.0 - 46.6 % Final      Platelets Platelets   Date Value Ref Range Status   12/29/2024 288 140 - 450 10*3/mm3 Final        PT/INR:  No results found for: \"PROTIME\"/No results found for: \"INR\"    Sodium Sodium   Date Value Ref Range Status   12/30/2024 137 136 - 145 mmol/L Final   12/29/2024 137 136 - 145 mmol/L Final      Potassium Potassium   Date Value Ref Range Status   12/30/2024 5.0 3.5 - 5.2 mmol/L Final     Comment:     Result checked     12/29/2024 3.5 3.5 - 5.2 mmol/L Final      Chloride Chloride   Date Value Ref Range Status   12/30/2024 104 98 - 107 mmol/L Final   12/29/2024 102 98 - 107 mmol/L Final      Bicarbonate CO2   Date Value Ref Range Status   12/30/2024 22.0 22.0 - 29.0 mmol/L Final   12/29/2024 21.4 (L) 22.0 - 29.0 mmol/L Final      BUN BUN   Date Value Ref Range Status   12/30/2024 26 (H) 8 - 23 mg/dL Final   12/29/2024 14 8 - 23 mg/dL Final      Creatinine Creatinine   Date Value Ref Range Status   12/30/2024 0.77 0.57 - 1.00 mg/dL Final   12/29/2024 0.65 0.57 - 1.00 mg/dL Final      Calcium Calcium   Date Value Ref Range Status   12/30/2024 9.9 8.6 - 10.5 mg/dL Final   12/29/2024 10.1 8.6 - 10.5 mg/dL Final      Magnesium No results found for: \"MG\"       atorvastatin, 20 mg, Oral, Nightly  furosemide, 40 mg, Intravenous, Daily  pantoprazole, 40 mg, Intravenous, Q AM  sodium chloride, 10 mL, Intravenous, Q12H           BENJAMIN 12/31/24        CHF (congestive heart failure)    Multifocal pneumonia    LBBB (left bundle branch block)      Assessment & Plan    - Severe aortic stenosis  - moderate mitral regurg  - PHTN  - acute on chronic diastolic heart failure, HFprEF 45-50%  - HTN  - HLD  - h/o sternal fracture, pt reports enlarged aorta on CT    PLAN  - BENJAMIN today, findings as stated above  - CT Chest pending to assess aorta  - Surgical plan pending review of results with Dr. Mcbride, " anticipate possible surgery later this week    Gris Willis, APRN  12/31/24  10:13 EST

## 2024-12-31 NOTE — PROGRESS NOTES
[facility] MEDICINE SERVICE  DAILY PROGRESS NOTE    NAME: Rachael Rodriguez  : 1955  MRN: [id number]      LOS: 2 days     PROVIDER OF SERVICE: Chris Bennett MD    Chief Complaint: CHF (congestive heart failure)    Subjective:     Interval History: Patient doing well today.  Underwent BENJAMIN this morning which showed mild mitral regurgitation but confirmed severe AS.        Review of Systems:   Review of Systems    Objective:     Vital Signs  Temp:  [97.6 °F (36.4 °C)-98.5 °F (36.9 °C)] 98.5 °F (36.9 °C)  Heart Rate:  [65-96] 65  Resp:  [8-20] 16  BP: ()/(59-93) 134/84  Flow (L/min) (Oxygen Therapy):  [15] 15   Body mass index is 32.68 kg/m².    Physical Exam  Physical Exam  General Appearance:  Alert, cooperative, no distress, appears stated age  Head:  Normocephalic, without obvious abnormality, atraumatic  Eyes:  PERRL, conjunctiva/corneas clear, EOM's intact, fundi benign, both eyes  Ears:  Normal TM's and external ear canals, both ears  Nose: Nares normal, septum midline, mucosa normal, no drainage or sinus tenderness  Throat: Lips, mucosa, and tongue normal; teeth and gums normal  Neck: Supple, symmetrical, trachea midline, no adenopathy, thyroid: not enlarged, symmetric, no tenderness/mass/nodules, no carotid bruit or JVD  Lungs:   Very mild rales bilaterally, respirations unlabored  Heart:  Regular rate and rhythm, S1, S2 normal, no murmur, rub or gallop  Abdomen:  Soft, non-tender, bowel sounds active all four quadrants,  no masses, no organomegaly  Extremities: Trace BL LE pitting edema  Pulses: 2+ and symmetric  Skin: Skin color, texture, turgor normal, no rashes or lesions  Neurologic: Normal         Diagnostic Data    Results from last 7 days   Lab Units 24  1034 24  1023 24  0423 24  0403   WBC 10*3/mm3  --   --   --  9.48   HEMOGLOBIN g/dL  --   --   --  13.5   HEMOGLOBIN, POC g/dL 12.9   < >  --   --    HEMATOCRIT %  --   --   --  41.2   HEMATOCRIT POC % 38    < >  --   --    PLATELETS 10*3/mm3  --   --   --  288   GLUCOSE mg/dL  --   --  140* 133*   CREATININE mg/dL  --   --  0.77 0.65   BUN mg/dL  --   --  26* 14   SODIUM mmol/L  --   --  137 137   POTASSIUM mmol/L  --   --  5.0 3.5   AST (SGOT) U/L  --   --   --  24   ALT (SGPT) U/L  --   --   --  13   ALK PHOS U/L  --   --   --  89   BILIRUBIN mg/dL  --   --   --  0.5   ANION GAP mmol/L  --   --  11.0 13.6    < > = values in this interval not displayed.       No radiology results for the last day      I reviewed the patient's new clinical results.    Assessment/Plan:     Active and Resolved Problems  Active Hospital Problems    Diagnosis  POA    **CHF (congestive heart failure) [I50.9]  Yes    Multifocal pneumonia [J18.9]  Yes    LBBB (left bundle branch block) [I44.7]  Yes      Resolved Hospital Problems   No resolved problems to display.       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  -Clinically patient is improving  -Continue IV diuretics; transition to oral diuretics soon  -Monitor I's/O and daily weights  -Echocardiogram shows slightly reduced EF of 46 to 50% as well as diastolic dysfunction  -Appreciate cardiology consult  -Underwent LHC on 12/30 which showed normal coronary arteries    Possible pneumonia  -Chest x-ray also concerning for multifocal pneumonia  -Initiated on IV antibiotic with ceftriaxone; completed course  -Was also initiated on IV steroids but I will transition this to a short course of oral prednisone  -Follow-up cultures and de-escalate antibiotics accordingly    Severe AS  -Seen on TTE  -Underwent LHC on 12/30 which showed no evidence of any coronary artery atherosclerosis  -Underwent BENJAMIN on 12/31 which confirmed severe AS and only some mild MR  -Prevent hypotension in the setting of severe AS    Hypertension  -Patient was severely hypertensive on admission likely related to volume overload although this is now improved  -Continue to monitor    Dyslipidemia  -Initiated  on high-dose statin therapy            VTE Prophylaxis:  Mechanical VTE prophylaxis orders are present.             Disposition Planning:     Barriers to Discharge: Further ischemic workup  Anticipated Date of Discharge: 1/1  Place of Discharge: Home      Time: 40 minutes     Code Status and Medical Interventions: CPR (Attempt to Resuscitate); Full Support   Ordered at: 12/30/24 1124     Level Of Support Discussed With:    Patient     Code Status (Patient has no pulse and is not breathing):    CPR (Attempt to Resuscitate)     Medical Interventions (Patient has pulse or is breathing):    Full Support       Signature: Electronically signed by Chirs Bennett MD, 12/31/24, 11:48 EST.  [facility] Hospitalist Team

## 2024-12-31 NOTE — PLAN OF CARE
Goal Outcome Evaluation:   Pt rested throughout shift. VSS. Pt remains on room air, denies chest pain. Pt NPO for BENJAMIN today. Family remains at bedside. All questions and concerns addressed.

## 2024-12-31 NOTE — CASE MANAGEMENT/SOCIAL WORK
Continued Stay Note  [facility]     Patient Name: Rachael Rodriguez  MRN: [id number]  Today's Date: 12/31/2024    Admit Date: 12/28/2024    Plan: Return home with spouse and extended family. No home oxygen and on 2 liters   Discharge Plan       Row Name 12/31/24 1001       Plan    Plan Comments Barriers: Oxygen 2 liters with no home oxygen.  BENJAMIN to be completed today.  Mrs. Rodriguez  plans to return home with her   at discharge.                          Edwina QUINONEZ,RN Case Manager  [facility]  Phone: Desk- [phone] cell- [phone]

## 2024-12-31 NOTE — ANESTHESIA PREPROCEDURE EVALUATION
Anesthesia Evaluation     Patient summary reviewed and Nursing notes reviewed   NPO Solid Status: > 8 hours             Airway   Mallampati: II  TM distance: >3 FB  Neck ROM: full  No difficulty expected  Dental - normal exam     Pulmonary - negative pulmonary ROS and normal exam   (-) not a smoker  Cardiovascular - normal exam    ECG reviewed    (+) hypertension  (-) angina, LARSON      Neuro/Psych- negative ROS  GI/Hepatic/Renal/Endo - negative ROS     Musculoskeletal (-) negative ROS    Abdominal  - normal exam    Bowel sounds: normal.   Substance History - negative use     OB/GYN negative ob/gyn ROS         Other                    Anesthesia Plan    ASA 3     general       Anesthetic plan, risks, benefits, and alternatives have been provided, discussed and informed consent has been obtained with: patient.    CODE STATUS:    Level Of Support Discussed With: Patient  Code Status (Patient has no pulse and is not breathing): CPR (Attempt to Resuscitate)  Medical Interventions (Patient has pulse or is breathing): Full Support

## 2024-12-31 NOTE — PROGRESS NOTES
Cardiology [facility]      Patient Care Team:  Candy Mayberry MD as PCP - General (Family Medicine)  Provider, No Known as PCP - Family Medicine        Cardiology assessment and plan     Shortness of breath  Dyspnea on exertion  Acute on chronic diastolic heart failure exacerbation secondary valvular heart disease  Severe aortic stenosis  Moderate mitral regurgitation  Moderate to severe pulmonary hypertension  Volume overload  Likely will benefit from a cardiac catheterization with left and right heart cath and possible BENJAMIN on Monday  Diagnosis and treatment options and risk benefits and alternatives reviewed and discussed with patient  Echocardiogram findings reviewed and discussed with patient  Possible allergy to contrast dye might need prophylaxis  Diagnosis and treatment options and risk benefits and alternatives reviewed and discussed with patient and family  Tmax is 99.2 pulse is 94 respirations are 20 blood pressure is 140/82 sats are 95%  Avoid hypotension  Normal thyroid function  Elevated lipids  Echocardiogram with LV ejection fraction of 45 to 50%  Severe aortic stenosis  Moderate mitral regurgitation  Moderate to severe pulmonary hypertension  Diagnosis and treatment options reviewed and discussed with patient and family  Severe aortic stenosis  Moderate aortic regurgitation  LV ejection fraction of 45 to 50%  Diastolic heart failure  Elevated abnormal proBNP  Plans to proceed with cardiac catheterization with left and right heart catheterization to evaluate the coronary anatomy and also assess the severity of the pulmonary hypertension and the reversibility of the pulmonary hypertension  Likely will benefit from a BENJAMIN to further evaluate the valvular heart disease  Based on the results of the cath and echo findings we will proceed with a further treatment options for the valvular heart disease  Diagnosis and treatment options reviewed and discussed with patient and family  Proceed with a contrast allergy  prophylaxis  Risk benefits and alternatives reviewed and discussed with patient and family  Scheduled for left and right heart catheterization today  Current medications include Lipitor 20 mg p.o. once a day Lasix 40 mg IV once a day patient is on Lovenox for DVT prophylaxis  Contrast allergy prophylaxis  Further recommendation based on patient course      December 31, 2024  Patient denies any new complaints  Shortness of breath is better  Denies any dizziness or syncope  Scheduled to have a BENJAMIN done to assess the severity of the mitral regurgitation  Patient was seen and evaluated by structural heart team and also surgical team  Severe aortic stenosis  Mild to moderate mitral regurgitation  Moderate pulmonary hypertension  LV ejection fraction of 45 to 50%  Tmax is 98.4 pulse is 86 respirations are 19 blood pressure is 138/94 sats are 93%  Sodium is 136 potassium is 4.0 creatinine is normal hemoglobin is 12.9  Current medications include Lipitor 20 mg p.o. once a day patient is on Lasix 40 mg IV once a day  Start patient on Lovenox for DVT prophylaxis  Discussed with patient and family at bedside  Cardiac catheterization with normal coronaries and moderate pulmonary hypertension  BENJAMIN with severe aortic stenosis and mild to moderate mitral regurgitation  Add low-dose dose of beta-blockers  Optimize medical therapy  Further recommendation based on patient course            Chief Complaint: Denies any new complaints    Subjective shortness of breath is somewhat better    Interval History: No significant cardiac arrhythmia    History taken from: patient    Review of Systems:  Review of Systems   Constitutional: Negative for chills, decreased appetite and malaise/fatigue.   HENT:  Negative for congestion and nosebleeds.    Eyes:  Negative for blurred vision and double vision.   Cardiovascular:  Positive for dyspnea on exertion and orthopnea. Negative for chest pain, irregular heartbeat, leg swelling and near-syncope.  "  Respiratory:  Positive for shortness of breath. Negative for cough.    Hematologic/Lymphatic: Negative for adenopathy. Does not bruise/bleed easily.   Skin:  Negative for color change and rash.   Musculoskeletal:  Negative for back pain and joint pain.   Gastrointestinal:  Negative for bloating, abdominal pain, hematemesis and hematochezia.   Genitourinary:  Negative for flank pain and hematuria.   Neurological:  Negative for dizziness and focal weakness.   Psychiatric/Behavioral:  Negative for altered mental status. The patient does not have insomnia.        Objective    Vital Signs  Visit Vitals  /94 (BP Location: Right arm, Patient Position: Lying)   Pulse 87   Temp 98.4 °F (36.9 °C) (Oral)   Resp 19   Ht 144.8 cm (57\")   Wt 68.5 kg (151 lb)   SpO2 93%   BMI 32.68 kg/m²     Oxygen Therapy  SpO2: 93 %  Pulse Oximetry Type: Continuous  Device (Oxygen Therapy): room air  Flow (L/min) (Oxygen Therapy): 15  ETCO2 (mmHg): (!) 0 mmHg  Flowsheet Rows      Flowsheet Row First Filed Value   Admission Height 144.8 cm (57\") Documented at 12/28/2024 0100   Admission Weight 65.8 kg (145 lb) Documented at 12/28/2024 0100          Intake & Output (last 3 days)         12/27 0701  12/28 0700 12/28 0701  12/29 0700 12/29 0701  12/30 0700 12/30 0701  12/31 0700    P.O.   720     Total Intake(mL/kg)   720 (10.5)     Urine (mL/kg/hr)   1925 (1.2)     Total Output   1925     Net   -1205             Urine Unmeasured Occurrence  1 x            Lines, Drains & Airways       Active LDAs       Name Placement date Placement time Site Days    Peripheral IV 12/29/24 2127 Anterior;Left;Proximal Forearm 12/29/24 2127  Forearm  less than 1    Arterial Sheath 6 Fr. Right Femoral 12/30/24  1015  Femoral  less than 1    Venous Sheath 7 Fr. Right Femoral 12/30/24  1017  Femoral  less than 1                    Physical Exam:  Constitutional:       Appearance: Well-developed.   Eyes:      Conjunctiva/sclera: Conjunctivae normal.      Pupils: " Pupils are equal, round, and reactive to light.   HENT:      Head: Normocephalic and atraumatic.   Neck:      Thyroid: No thyromegaly.   Pulmonary:      Effort: Pulmonary effort is normal.      Breath sounds: Normal breath sounds. Examination of the right-lower field reveals decreased breath sounds. Examination of the left-lower field reveals decreased breath sounds.   Cardiovascular:      Abnormal PMI. Normal rate. Regular rhythm.   Pulses:     Intact distal pulses.   Edema:     Peripheral edema absent.   Abdominal:      General: Bowel sounds are normal.      Palpations: Abdomen is soft.   Musculoskeletal:      Cervical back: Normal range of motion and neck supple. Skin:     General: Skin is warm.   Neurological:      Mental Status: Alert and oriented to person, place, and time.         Results Review:     I reviewed the patient's new clinical results.    Lab Results (last 24 hours)       Procedure Component Value Units Date/Time    Blood Culture - Blood, Arm, Left [[id number]]  (Normal) Collected: 12/28/24 0318    Specimen: Blood from Arm, Left Updated: 12/31/24 0330     Blood Culture No growth at 3 days    Narrative:      Less than seven (7) mL's of blood was collected.  Insufficient quantity may yield false negative results.    Blood Culture - Blood, Arm, Right [[id number]]  (Normal) Collected: 12/28/24 0311    Specimen: Blood from Arm, Right Updated: 12/31/24 0315     Blood Culture No growth at 3 days          Results for orders placed during the hospital encounter of 12/28/24    Adult Transthoracic Echo Complete W/ Cont if Necessary Per Protocol    Interpretation Summary    Left ventricular ejection fraction appears to be 46 - 50%.    Left ventricular diastolic function is consistent with (grade II w/high LAP) pseudonormalization.    The left atrial cavity is moderate to severely dilated.    Severe aortic valve stenosis is present.    Moderate mitral valve regurgitation is present.    Estimated right ventricular  systolic pressure from tricuspid regurgitation is moderately elevated (45-55 mmHg).    Moderate to severe pulmonary hypertension is present.        Medication Review:   I have reviewed the patient's current medication list  Scheduled Meds:atorvastatin, 20 mg, Oral, Nightly  furosemide, 40 mg, Intravenous, Daily  pantoprazole, 40 mg, Intravenous, Q AM  sodium chloride, 10 mL, Intravenous, Q12H      Continuous Infusions:   PRN Meds:.  acetaminophen    atropine    senna-docusate sodium **AND** polyethylene glycol **AND** bisacodyl **AND** bisacodyl    Calcium Replacement - Follow Nurse / BPA Driven Protocol    Magnesium Standard Dose Replacement - Follow Nurse / BPA Driven Protocol    melatonin    nitroglycerin    ondansetron ODT **OR** ondansetron    Phosphorus Replacement - Follow Nurse / BPA Driven Protocol    Potassium Replacement - Follow Nurse / BPA Driven Protocol    sodium chloride    sodium chloride    ECG/EMG Results (last 24 hours)       ** No results found for the last 24 hours. **            Imaging Results (Last 24 Hours)       Procedure Component Value Units Date/Time    CT Chest Without Contrast Diagnostic [[id number]] Collected: 12/31/24 1248     Updated: 12/31/24 1255    Narrative:      CT CHEST WO CONTRAST DIAGNOSTIC    Date of Exam: 12/31/2024 12:41 PM EST    Indication: Previous sternal fracture, eval for aortic aneurysm, pre-op open heart.    Comparison: PA and lateral chest radiograph 12/28/2024. No prior CT chest for comparison.    Technique: Axial CT images were obtained of the chest without contrast administration.  Sagittal and coronal reconstructions were performed.  Automated exposure control and iterative reconstruction methods were used.      Findings:  There is heavy aortic valve calcification. The aortic root measures approximately 2.5 cm at the annulus, 2.9 cm at the sinuses of Valsalva, 2.6 cm at the sinotubular junction (series 3 image 42). The mid ascending thoracic aorta measures  3.3 cm (2/41)   mid transverse thoracic aorta measures 2.4 cm (2/71), mid ascending thoracic aorta 2.5 cm (2/39), within normal limits, accounting for the thoracic aortic tortuosity endplate of curvature.    The imaged upper abdominal aortic caliber is within normal limits. Conventional branching pattern of the right arteries from the aortic arch. Only minimal calcific atherosclerosis is seen within the thoracic and abdominal aorta.    Heart size is mildly enlarged. Mild mitral annulus calcifications are present. No definite coronary artery calcifications are seen. No pericardial effusion or pleural effusion. No pathologically enlarged lymph nodes. Thyroid gland is within normal   limits.    No acute airspace disease. Minimal linear scarring within the lingula and left lower lobe.    Multiple small nonobstructing bilateral renal stones, largest in the left mid kidney measuring 6 mm. Small gallstones are present without evidence of cholecystitis. Low-density lesions in the right upper renal pole are statistically favored represent   cysts. Tiny fat-containing umbilical hernia is noted. Remainder of the imaged upper abdominal organs have a normal noncontrast appearance.    Thoracic dextroscoliosis and thoracolumbar junction levoscoliosis. No acute or suspicious osseous abnormalities are identified.      Impression:      Impression:  1.No thoracic aortic aneurysm.  2.Heavy aortic valve calcification. Correlate for symptoms of aortic stenosis.  3.Mild cardiomegaly.  4.Nonobstructing bilateral renal stones.  5.Uncomplicated cholelithiasis.        Electronically Signed: Florida Caro MD    12/31/2024 12:53 PM EST    Workstation ID: [id number]          Results for orders placed during the hospital encounter of 12/28/24    Cardiac Catheterization/Vascular Study    Conclusion  Table formatting from the original result was not included.  Cardiac Catheterization Operative Report    Rachael BARR  Rodriguez  [id number]  12/30/2024  @PCP@    She underwent cardiac catheterization.    Indications for the procedure include: Valvular heart disease with severe aortic stenosis.    Results for orders placed during the hospital encounter of 12/28/24    Adult Transthoracic Echo Complete W/ Cont if Necessary Per Protocol    Interpretation Summary    Left ventricular ejection fraction appears to be 46 - 50%.    Left ventricular diastolic function is consistent with (grade II w/high LAP) pseudonormalization.    The left atrial cavity is moderate to severely dilated.    Severe aortic valve stenosis is present.    Moderate mitral valve regurgitation is present.    Estimated right ventricular systolic pressure from tricuspid regurgitation is moderately elevated (45-55 mmHg).    Moderate to severe pulmonary hypertension is present.    No results found for this or any previous visit.        Procedure Details:  The risks, benefits, complications, treatment options, and expected outcomes were discussed with the patient. The patient and/or family concurred with the proposed plan, giving informed consent.    After informed consent the patient was brought to the cath lab after appropriate IV hydration was begun and oral premedication was given. She was further sedated with fentanyl. She was prepped and draped in the usual manner. Using the modified Seldinger access technique, a 6 Estonian sheath was placed in the femoral artery. A left heart catheterization with coronary arteriography was performed. Findings are discussed below.    7 Estonian venous sheath was placed in the right femoral vein using ultrasound guidance and then we used a 7 Estonian chest tube Shawmut-Oscar catheter to the right heart catheterization and hemodynamic pressure measurements.    After the procedure was completed, sedation was stopped and the sheaths and catheters were all removed. Hemostasis was achieved per established hospital protocols.    Conscious  sedation:  Conscious sedation was performed according to protocol.  I supervised and directed an independent trained observer with the assistance of monitoring the patient's level of consciousness and physiologic status throughout the procedure.  Intraoperative service time was 90 minutes.    Findings:    Hemodynamics Central aortic pressure systolic 146 diastolic 88 with a mean pressure of 150 mmHg      Right heart catheterization with hemodynamics      Pulmonary capillary wedge pressure was 20 mmHg  PA pressure systolic 45 diastolic 28 with a mean pressure of 36 mmHg  RV pressure systolic 40 mmHg  Right atrial pressure was 8 mmHg  PA saturation was 73%  Right atrial saturation was of 69%  Aortic saturation was 96%  Silvia equation cardiac output was 4.3 L/min  Silvia equation cardiac index was 2.6 L/min/m²  Pulmonary vascular resistance was 239 dynes per second  Systemic vascular resistance was 2002 dynes per second  Left Main Large-caliber vessel angiographically normal bifurcates into left anterior descending and left circumflex arteries  RCA Large-caliber vessel angiographically normal  LAD Large-caliber vessel angiographically normal  Circ Large-caliber vessel angiographically normal  LV Not done  Coronary Dominance Right coronary artery    Estimated Blood Loss:  Minimal    Specimens: None    Complications:  None; patient tolerated the procedure well.    Disposition: PACU - hemodynamically stable.    Condition: stable    Impressions:  Normal coronaries  Moderate pulmonary hypertension    Recommendations:  Further evaluation for TAVR versus surgical AVR     Results for orders placed during the hospital encounter of 12/28/24    Adult Transthoracic Echo Complete W/ Cont if Necessary Per Protocol    Interpretation Summary    Left ventricular ejection fraction appears to be 46 - 50%.    Left ventricular diastolic function is consistent with (grade II w/high LAP) pseudonormalization.    The left atrial cavity is  moderate to severely dilated.    Severe aortic valve stenosis is present.    Moderate mitral valve regurgitation is present.    Estimated right ventricular systolic pressure from tricuspid regurgitation is moderately elevated (45-55 mmHg).    Moderate to severe pulmonary hypertension is present.     Lab Results   Component Value Date    GLUCOSE 140 (H) 12/30/2024    BUN 26 (H) 12/30/2024    CREATININE 0.77 12/30/2024    EGFR 84.1 12/30/2024    BCR 33.8 (H) 12/30/2024    K 5.0 12/30/2024    CO2 22.0 12/30/2024    CALCIUM 9.9 12/30/2024    ALBUMIN 4.4 12/29/2024    BILITOT 0.5 12/29/2024    AST 24 12/29/2024    ALT 13 12/29/2024      Lab Results   Component Value Date    CHOL 248 (H) 12/28/2024    TRIG 87 12/28/2024    HDL 99 (H) 12/28/2024     (H) 12/28/2024      Lab Results   Component Value Date    TROPONINT 16 (H) 12/28/2024        Assessment & Plan       CHF (congestive heart failure)    Multifocal pneumonia    LBBB (left bundle branch block)        Makayla Driscoll MD  12/31/24  17:34 EST

## 2024-12-31 NOTE — ANESTHESIA POSTPROCEDURE EVALUATION
Patient: Rachael Rodriguez    Procedure Summary       Date: 12/31/24 Room / Location: [facility]    Anesthesia Start: 1015 Anesthesia Stop: 1045    Procedure: ADULT TRANSESOPHAGEAL ECHO (BENJAMIN) W/ CONT IF NECESSARY PER PROTOCOL Diagnosis: (Valvular Disease)    Scheduled Providers: Keven Jasso MD Provider: Rodolfo Chinchilla MD    Anesthesia Type: general ASA Status: 3            Anesthesia Type: general    Vitals  Vitals Value Taken Time   /80 12/31/24 1142   Temp 97.6 °F (36.4 °C) 12/31/24 1222   Pulse 87 12/31/24 1248   Resp 16 12/31/24 1140   SpO2 92 % 12/31/24 1248   Vitals shown include unfiled device data.        Post Anesthesia Care and Evaluation    Patient location during evaluation: PACU  Patient participation: complete - patient participated  Level of consciousness: awake  Pain score: 0  Pain management: adequate  Anesthetic complications: No anesthetic complications  PONV Status: none  Cardiovascular status: acceptable  Respiratory status: acceptable  Hydration status: acceptable

## 2025-01-01 ENCOUNTER — READMISSION MANAGEMENT (OUTPATIENT)
Dept: CALL CENTER | Facility: HOSPITAL | Age: 70
End: 2025-01-01
Payer: MEDICARE

## 2025-01-01 VITALS
TEMPERATURE: 98.2 F | RESPIRATION RATE: 18 BRPM | OXYGEN SATURATION: 94 % | HEIGHT: 57 IN | BODY MASS INDEX: 32.58 KG/M2 | SYSTOLIC BLOOD PRESSURE: 125 MMHG | DIASTOLIC BLOOD PRESSURE: 78 MMHG | WEIGHT: 151 LBS | HEART RATE: 85 BPM

## 2025-01-01 PROBLEM — J18.9 PNEUMONIA, UNSPECIFIED ORGANISM: Status: ACTIVE | Noted: 2025-01-01

## 2025-01-01 PROBLEM — I50.23 ACUTE ON CHRONIC HFREF (HEART FAILURE WITH REDUCED EJECTION FRACTION): Status: ACTIVE | Noted: 2025-01-01

## 2025-01-01 PROCEDURE — 99232 SBSQ HOSP IP/OBS MODERATE 35: CPT | Performed by: INTERNAL MEDICINE

## 2025-01-01 PROCEDURE — 25010000002 FUROSEMIDE PER 20 MG: Performed by: INTERNAL MEDICINE

## 2025-01-01 RX ORDER — METOPROLOL SUCCINATE 25 MG/1
12.5 TABLET, EXTENDED RELEASE ORAL
Qty: 15 TABLET | Refills: 0 | Status: SHIPPED | OUTPATIENT
Start: 2025-01-02 | End: 2025-02-01

## 2025-01-01 RX ORDER — PANTOPRAZOLE SODIUM 40 MG/1
40 TABLET, DELAYED RELEASE ORAL
Status: DISCONTINUED | OUTPATIENT
Start: 2025-01-02 | End: 2025-01-01 | Stop reason: HOSPADM

## 2025-01-01 RX ORDER — ATORVASTATIN CALCIUM 20 MG/1
20 TABLET, FILM COATED ORAL NIGHTLY
Qty: 30 TABLET | Refills: 0 | Status: SHIPPED | OUTPATIENT
Start: 2025-01-01 | End: 2025-01-31

## 2025-01-01 RX ADMIN — Medication 10 ML: at 09:08

## 2025-01-01 RX ADMIN — FUROSEMIDE 40 MG: 10 INJECTION, SOLUTION INTRAMUSCULAR; INTRAVENOUS at 09:07

## 2025-01-01 RX ADMIN — PANTOPRAZOLE SODIUM 40 MG: 40 INJECTION, POWDER, FOR SOLUTION INTRAVENOUS at 05:18

## 2025-01-01 RX ADMIN — METOPROLOL SUCCINATE 12.5 MG: 25 TABLET, EXTENDED RELEASE ORAL at 09:07

## 2025-01-01 RX ADMIN — Medication 5 MG: at 01:48

## 2025-01-01 NOTE — DISCHARGE SUMMARY
".             [facility] Medicine Services  Discharge Summary    Date of Service: 2025  Patient Name: Rachael Rodriguez  : 1955  MRN: [id number]    Date of Admission: 2024  Discharge Diagnosis: CHF (congestive heart failure)  Date of Discharge: 2025  Primary Care Physician: Candy Mayberry MD      Presenting Problem:   CHF (congestive heart failure) [I50.9]  LBBB (left bundle branch block) [I44.7]  Multifocal pneumonia [J18.9]  Acute congestive heart failure, unspecified heart failure type [I50.9]  Acute on chronic HFrEF (heart failure with reduced ejection fraction) [I50.23]    Active and Resolved Hospital Problems:  Active Hospital Problems    Diagnosis POA    **CHF (congestive heart failure) [I50.9] Yes    Pneumonia, unspecified organism [J18.9] Yes    Acute on chronic HFrEF (heart failure with reduced ejection fraction) [I50.23] Yes    Multifocal pneumonia [J18.9] Yes    LBBB (left bundle branch block) [I44.7] Yes      Resolved Hospital Problems   No resolved problems to display.         Hospital Course     HPI:    \"Rachael Rodriguez is a 69 y.o. female with no PMH who presented to [facility] on 2024 with shortness of breath.  Patient states that dyspnea has been going on for at least the last week.  And has increasingly became worse.  Patient also with left back pain with no radiation, and feeling bandlike around lower chest.  Patient states that her legs feel heavy\" like mush \"patient does endorse polyuria, fatigue, decreased p.o. intake.  Denies any vomiting,syncope.  Patient was seen and FSED \"    Hospital Course:  Admitted and observed closely.  Reports feeling much better today.  Cardiology consulted and followed patient throughout hospital course.  Patient underwent echocardiogram as well as cardiac catheterization.  She has been cleared by cardiology for discharge today.  New medication added to patient's home medication list.  She will follow-up outpatient with " cardiology and her PCP.  All questions and concerns addressed.  She is happy to go home.        DISCHARGE Follow Up Recommendations for labs and diagnostics: PCP and cardiology        Day of Discharge     Vital Signs:  Temp:  [98 °F (36.7 °C)-98.5 °F (36.9 °C)] 98 °F (36.7 °C)  Heart Rate:  [56-89] 74  Resp:  [12-20] 16  BP: ()/(54-94) 120/73    Physical Exam:  Physical Exam   General Appearance:  Alert, cooperative, no distress, appears stated age  Head:   Normocephalic, without obvious abnormality, atraumatic  Eyes:   PERRL, conjunctiva/corneas clear, EOM's intact, fundi benign, both eyes  Ears:    Normal TM's and external ear canals, both ears  Nose:Nares normal, septum midline, mucosa normal, no drainage or sinus tenderness  Throat:Lips, mucosa, and tongue normal; teeth and gums normal  Neck:Supple, symmetrical, trachea midline, no adenopathy, thyroid: not enlarged, symmetric, no tenderness/mass/nodules, no carotid bruit or JVD  Lungs:             Very mild rales bilaterally, respirations unlabored  Heart:  Regular rate and rhythm, S1, S2 normal, no murmur, rub or gallop  Abdomen:  Soft, non-tender, bowel sounds active all four quadrants,  no masses, no organomegaly  Extremities:Trace BL LE pitting edema  Pulses:2+ and symmetric  Skin:Skin color, texture, turgor normal, no rashes or lesions  Neurologic: Normal      Pertinent  and/or Most Recent Results     LAB RESULTS:      Lab 12/30/24  1034 12/30/24  1027 12/30/24  1024 12/30/24  1023 12/29/24  0403 12/28/24  1412 12/28/24  0311 12/28/24  0239   WBC  --   --   --   --  9.48  --   --  11.71*   HEMOGLOBIN  --   --   --   --  13.5  --   --  13.8   HEMOGLOBIN, POC 12.9 13.6 13.6 13.9  --   --   --   --    HEMATOCRIT  --   --   --   --  41.2  --   --  42.5   HEMATOCRIT POC 38 40 40 41  --   --   --   --    PLATELETS  --   --   --   --  288  --   --  274   NEUTROS ABS  --   --   --   --  7.49*  --   --  8.91*   IMMATURE GRANS (ABS)  --   --   --   --   --   --    --  0.02   LYMPHS ABS  --   --   --   --   --   --   --  1.69   MONOS ABS  --   --   --   --   --   --   --  0.87   EOS ABS  --   --   --   --   --   --   --  0.16   MCV  --   --   --   --  82.2  --   --  83.5   LACTATE  --   --   --   --   --   --  1.0  --    D DIMER QUANT  --   --   --   --   --  1.18*  --   --          Lab 12/30/24  0423 12/29/24  0403 12/28/24  1412 12/28/24  0239   SODIUM 137 137  --  137   POTASSIUM 5.0 3.5  --  3.5   CHLORIDE 104 102  --  103   CO2 22.0 21.4*  --  23.7   ANION GAP 11.0 13.6  --  10.3   BUN 26* 14  --  11   CREATININE 0.77 0.65  --  0.59   EGFR 84.1 96.0  --  98.3   GLUCOSE 140* 133*  --  119*   CALCIUM 9.9 10.1  --  10.4   HEMOGLOBIN A1C  --   --  5.41  --    TSH  --   --  1.320  --          Lab 12/29/24  0403 12/28/24  0239   TOTAL PROTEIN 7.0 7.0   ALBUMIN 4.4 4.3   GLOBULIN 2.6 2.7   ALT (SGPT) 13 14   AST (SGOT) 24 18   BILIRUBIN 0.5 0.6   ALK PHOS 89 91         Lab 12/28/24  1412 12/28/24  0336 12/28/24  0239   PROBNP  --   --  3,139.0*   HSTROP T 16* 17* 19*         Lab 12/28/24  1412   CHOLESTEROL 248*   LDL CHOL 134*   HDL CHOL 99*   TRIGLYCERIDES 87             Lab 12/30/24  1034 12/30/24  1027   PH, ARTERIAL 7.380 7.380   PCO2, ARTERIAL 37.9 38.0   PO2 ART 81.0 56.0*   O2 SATURATION ART 96.0 89.0*   HCO3 ART 22.7 22.8   BASE EXCESS ART <0.0* <0.0*     Brief Urine Lab Results       None          Microbiology Results (last 10 days)       Procedure Component Value - Date/Time    Blood Culture - Blood, Arm, Left [[id number]]  (Normal) Collected: 12/28/24 0318    Lab Status: Preliminary result Specimen: Blood from Arm, Left Updated: 01/01/25 0330     Blood Culture No growth at 4 days    Narrative:      Less than seven (7) mL's of blood was collected.  Insufficient quantity may yield false negative results.    Blood Culture - Blood, Arm, Right [[id number]]  (Normal) Collected: 12/28/24 0311    Lab Status: Preliminary result Specimen: Blood from Arm, Right Updated:  01/01/25 0315     Blood Culture No growth at 4 days    COVID-19 and FLU A/B PCR, 1 HR TAT - Swab, Nasopharynx [[id number]]  (Normal) Collected: 12/28/24 0239    Lab Status: Final result Specimen: Swab from Nasopharynx Updated: 12/28/24 0306     COVID19 Not Detected     Influenza A PCR Not Detected     Influenza B PCR Not Detected    Narrative:      Fact sheet for providers: https://www.fda.gov/media/982081/download    Fact sheet for patients: https://www.fda.gov/media/386241/download    Test performed by PCR.            CT Chest Without Contrast Diagnostic    Result Date: 12/31/2024  Impression: Impression: 1.No thoracic aortic aneurysm. 2.Heavy aortic valve calcification. Correlate for symptoms of aortic stenosis. 3.Mild cardiomegaly. 4.Nonobstructing bilateral renal stones. 5.Uncomplicated cholelithiasis. Electronically Signed: Florida Caro MD  12/31/2024 12:53 PM EST  Workstation ID: [id number]    XR Chest PA & Lateral    Result Date: 12/28/2024  Impression: Impression: Multifocal pneumonia, most pronounced within the bilateral lower lobes, right greater than left. Follow-up to resolution recommended. Electronically Signed: Gillian Lieberman MD  12/28/2024 1:22 AM EST  Workstation ID: [id number]             Results for orders placed during the hospital encounter of 12/28/24    Adult Transesophageal Echo ([facility]) W/ Cont if Necessary Per Protocol    Interpretation Summary    Left ventricular ejection fraction appears to be 46 - 50%.    Aortic valve is heavily  calcified. There is severe aortic valve stenosis present.    Mitral valve is grossly normal, there is mild to moderate regurgitation.    Saline test results are negative.    Electronically signed by Keven Jasso MD, 12/31/24, 11:50 AM EST.      Labs Pending at Discharge:  Pending Results       Procedure [Order ID] Specimen - Date/Time    Basic Metabolic Panel [[id number]]     Specimen: Blood             Procedures  Performed  Procedure(s):  Right and Left Heart Cath         Consults:   Consults       Date and Time Order Name Status Description    12/30/2024  3:30 PM Inpatient Cardiothoracic Surgery Consult      12/28/2024 11:36 AM Inpatient Cardiology Consult Completed               Discharge Details        Discharge Medications        New Medications        Instructions Start Date   atorvastatin 20 MG tablet  Commonly known as: LIPITOR   20 mg, Oral, Nightly      metoprolol succinate XL 25 MG 24 hr tablet  Commonly known as: TOPROL-XL   12.5 mg, Oral, Every 24 Hours Scheduled   Start Date: January 2, 2025              Allergies   Allergen Reactions    Sulfa Antibiotics GI Intolerance    Penicillins Rash         Discharge Disposition: Home   Home or Self Care    Diet:  Hospital:  Diet Order   Procedures    Diet: Cardiac; Healthy Heart (2-3 Na+); Fluid Consistency: Thin (IDDSI 0)         Discharge Activity:         CODE STATUS:  Code Status and Medical Interventions: CPR (Attempt to Resuscitate); Full Support   Ordered at: 12/30/24 1124     Level Of Support Discussed With:    Patient     Code Status (Patient has no pulse and is not breathing):    CPR (Attempt to Resuscitate)     Medical Interventions (Patient has pulse or is breathing):    Full Support         No future appointments.        Time spent on Discharge including face to face service:  > 35 minutes    Signature: Electronically signed by José Miguel Delacruz MD, 01/01/25, 14:14 EST.  [facility] Hospitalist Team

## 2025-01-01 NOTE — CASE MANAGEMENT/SOCIAL WORK
Case Management Discharge Note      Final Note: Patient on Room air at time of DC. No additional service needs identified. Patient to transport home with spouse.        Transportation Services  Private: Car (with spouse)    Final Discharge Disposition Code: 01 - home or self-care    Nataly Gauthier RN    Office Phone: [phone]  Office Cell:     [phone]

## 2025-01-01 NOTE — PLAN OF CARE
"Goal Outcome Evaluation:  Plan of Care Reviewed With: patient, spouse        Progress: no change        Pt expressed worry and anxiety regarding possible surgery for valve. Pt would like to discuss statin vs \"natural statin\" with provider.                         "

## 2025-01-01 NOTE — PROGRESS NOTES
Cardiology [facility]      Patient Care Team:  Candy Mayberry MD as PCP - General (Family Medicine)  Provider, No Known as PCP - Family Medicine        Cardiology assessment and plan     Shortness of breath  Dyspnea on exertion  Acute on chronic diastolic heart failure exacerbation secondary valvular heart disease  Severe aortic stenosis  Moderate mitral regurgitation  Moderate to severe pulmonary hypertension  Volume overload  Likely will benefit from a cardiac catheterization with left and right heart cath and possible BENJAMIN on Monday  Diagnosis and treatment options and risk benefits and alternatives reviewed and discussed with patient  Echocardiogram findings reviewed and discussed with patient  Possible allergy to contrast dye might need prophylaxis  Diagnosis and treatment options and risk benefits and alternatives reviewed and discussed with patient and family  Tmax is 99.2 pulse is 94 respirations are 20 blood pressure is 140/82 sats are 95%  Avoid hypotension  Normal thyroid function  Elevated lipids  Echocardiogram with LV ejection fraction of 45 to 50%  Severe aortic stenosis  Moderate mitral regurgitation  Moderate to severe pulmonary hypertension  Diagnosis and treatment options reviewed and discussed with patient and family  Severe aortic stenosis  Moderate aortic regurgitation  LV ejection fraction of 45 to 50%  Diastolic heart failure  Elevated abnormal proBNP  Severe aortic stenosis  Mild to moderate mitral regurgitation  Moderate pulmonary hypertension  LV ejection fraction of 45 to 50%  Denies any new cardiac symptoms  Tmax is 98.5 pulse is 74 respirations are 16 blood pressure is 120/70 sats are 94%  Check BMP today  Current medications include Lipitor 20 mg p.o. once a day patient is on Lasix 40 mg IV once a day  Start patient on Lovenox for DVT prophylaxis  Discussed with patient and family at bedside  Cardiac catheterization with normal coronaries and moderate pulmonary hypertension  BENJAMIN with severe  "aortic stenosis and mild to moderate mitral regurgitation  CT chest with no thoracic aortic aneurysm  Add low-dose dose of beta-blockers  Optimize medical therapy  Further recommendation based on patient course            Chief Complaint: Denies any new complaints    Subjective shortness of breath is somewhat better    Interval History: No significant cardiac arrhythmia    History taken from: patient    Review of Systems:  Review of Systems   Constitutional: Negative for chills, decreased appetite and malaise/fatigue.   HENT:  Negative for congestion and nosebleeds.    Eyes:  Negative for blurred vision and double vision.   Cardiovascular:  Positive for dyspnea on exertion and orthopnea. Negative for chest pain, irregular heartbeat, leg swelling and near-syncope.   Respiratory:  Positive for shortness of breath. Negative for cough.    Hematologic/Lymphatic: Negative for adenopathy. Does not bruise/bleed easily.   Skin:  Negative for color change and rash.   Musculoskeletal:  Negative for back pain and joint pain.   Gastrointestinal:  Negative for bloating, abdominal pain, hematemesis and hematochezia.   Genitourinary:  Negative for flank pain and hematuria.   Neurological:  Negative for dizziness and focal weakness.   Psychiatric/Behavioral:  Negative for altered mental status. The patient does not have insomnia.        Objective    Vital Signs  Visit Vitals  /73 (BP Location: Right arm, Patient Position: Sitting)   Pulse 74   Temp 98 °F (36.7 °C) (Oral)   Resp 16   Ht 144.8 cm (57\")   Wt 68.5 kg (151 lb)   SpO2 94%   BMI 32.68 kg/m²     Oxygen Therapy  SpO2: 94 %  Pulse Oximetry Type: Continuous  Device (Oxygen Therapy): room air  Flow (L/min) (Oxygen Therapy): 15  ETCO2 (mmHg): (!) 0 mmHg  Flowsheet Rows      Flowsheet Row First Filed Value   Admission Height 144.8 cm (57\") Documented at 12/28/2024 0100   Admission Weight 65.8 kg (145 lb) Documented at 12/28/2024 0100          Intake & Output (last 3 days)  "        12/27 0701  12/28 0700 12/28 0701  12/29 0700 12/29 0701  12/30 0700 12/30 0701  12/31 0700    P.O.   720     Total Intake(mL/kg)   720 (10.5)     Urine (mL/kg/hr)   1925 (1.2)     Total Output   1925     Net   -1205             Urine Unmeasured Occurrence  1 x            Lines, Drains & Airways       Active LDAs       Name Placement date Placement time Site Days    Peripheral IV 12/29/24 2127 Anterior;Left;Proximal Forearm 12/29/24 2127  Forearm  less than 1    Arterial Sheath 6 Fr. Right Femoral 12/30/24  1015  Femoral  less than 1    Venous Sheath 7 Fr. Right Femoral 12/30/24  1017  Femoral  less than 1                    Physical Exam:  Constitutional:       Appearance: Well-developed.   Eyes:      Conjunctiva/sclera: Conjunctivae normal.      Pupils: Pupils are equal, round, and reactive to light.   HENT:      Head: Normocephalic and atraumatic.   Neck:      Thyroid: No thyromegaly.   Pulmonary:      Effort: Pulmonary effort is normal.      Breath sounds: Normal breath sounds. Examination of the right-lower field reveals decreased breath sounds. Examination of the left-lower field reveals decreased breath sounds.   Cardiovascular:      Abnormal PMI. Normal rate. Regular rhythm.   Pulses:     Intact distal pulses.   Edema:     Peripheral edema absent.   Abdominal:      General: Bowel sounds are normal.      Palpations: Abdomen is soft.   Musculoskeletal:      Cervical back: Normal range of motion and neck supple. Skin:     General: Skin is warm.   Neurological:      Mental Status: Alert and oriented to person, place, and time.         Results Review:     I reviewed the patient's new clinical results.    Lab Results (last 24 hours)       Procedure Component Value Units Date/Time    Blood Culture - Blood, Arm, Left [[id number]]  (Normal) Collected: 12/28/24 0318    Specimen: Blood from Arm, Left Updated: 01/01/25 0330     Blood Culture No growth at 4 days    Narrative:      Less than seven (7) mL's of blood  was collected.  Insufficient quantity may yield false negative results.    Blood Culture - Blood, Arm, Right [[id number]]  (Normal) Collected: 12/28/24 0311    Specimen: Blood from Arm, Right Updated: 01/01/25 0315     Blood Culture No growth at 4 days          Results for orders placed during the hospital encounter of 12/28/24    Adult Transthoracic Echo Complete W/ Cont if Necessary Per Protocol    Interpretation Summary    Left ventricular ejection fraction appears to be 46 - 50%.    Left ventricular diastolic function is consistent with (grade II w/high LAP) pseudonormalization.    The left atrial cavity is moderate to severely dilated.    Severe aortic valve stenosis is present.    Moderate mitral valve regurgitation is present.    Estimated right ventricular systolic pressure from tricuspid regurgitation is moderately elevated (45-55 mmHg).    Moderate to severe pulmonary hypertension is present.        Medication Review:   I have reviewed the patient's current medication list  Scheduled Meds:atorvastatin, 20 mg, Oral, Nightly  furosemide, 40 mg, Intravenous, Daily  metoprolol succinate XL, 12.5 mg, Oral, Q24H  pantoprazole, 40 mg, Intravenous, Q AM  sodium chloride, 10 mL, Intravenous, Q12H      Continuous Infusions:   PRN Meds:.  acetaminophen    atropine    senna-docusate sodium **AND** polyethylene glycol **AND** bisacodyl **AND** bisacodyl    Calcium Replacement - Follow Nurse / BPA Driven Protocol    Magnesium Standard Dose Replacement - Follow Nurse / BPA Driven Protocol    melatonin    nitroglycerin    ondansetron ODT **OR** ondansetron    Phosphorus Replacement - Follow Nurse / BPA Driven Protocol    Potassium Replacement - Follow Nurse / BPA Driven Protocol    sodium chloride    sodium chloride    ECG/EMG Results (last 24 hours)       ** No results found for the last 24 hours. **            Imaging Results (Last 24 Hours)       Procedure Component Value Units Date/Time    CT Chest Without Contrast  Diagnostic [[id number]] Collected: 12/31/24 1248     Updated: 12/31/24 1255    Narrative:      CT CHEST WO CONTRAST DIAGNOSTIC    Date of Exam: 12/31/2024 12:41 PM EST    Indication: Previous sternal fracture, eval for aortic aneurysm, pre-op open heart.    Comparison: PA and lateral chest radiograph 12/28/2024. No prior CT chest for comparison.    Technique: Axial CT images were obtained of the chest without contrast administration.  Sagittal and coronal reconstructions were performed.  Automated exposure control and iterative reconstruction methods were used.      Findings:  There is heavy aortic valve calcification. The aortic root measures approximately 2.5 cm at the annulus, 2.9 cm at the sinuses of Valsalva, 2.6 cm at the sinotubular junction (series 3 image 42). The mid ascending thoracic aorta measures 3.3 cm (2/41)   mid transverse thoracic aorta measures 2.4 cm (2/71), mid ascending thoracic aorta 2.5 cm (2/39), within normal limits, accounting for the thoracic aortic tortuosity endplate of curvature.    The imaged upper abdominal aortic caliber is within normal limits. Conventional branching pattern of the right arteries from the aortic arch. Only minimal calcific atherosclerosis is seen within the thoracic and abdominal aorta.    Heart size is mildly enlarged. Mild mitral annulus calcifications are present. No definite coronary artery calcifications are seen. No pericardial effusion or pleural effusion. No pathologically enlarged lymph nodes. Thyroid gland is within normal   limits.    No acute airspace disease. Minimal linear scarring within the lingula and left lower lobe.    Multiple small nonobstructing bilateral renal stones, largest in the left mid kidney measuring 6 mm. Small gallstones are present without evidence of cholecystitis. Low-density lesions in the right upper renal pole are statistically favored represent   cysts. Tiny fat-containing umbilical hernia is noted. Remainder of the imaged  upper abdominal organs have a normal noncontrast appearance.    Thoracic dextroscoliosis and thoracolumbar junction levoscoliosis. No acute or suspicious osseous abnormalities are identified.      Impression:      Impression:  1.No thoracic aortic aneurysm.  2.Heavy aortic valve calcification. Correlate for symptoms of aortic stenosis.  3.Mild cardiomegaly.  4.Nonobstructing bilateral renal stones.  5.Uncomplicated cholelithiasis.        Electronically Signed: Florida Caro MD    12/31/2024 12:53 PM EST    Workstation ID: [id number]          Results for orders placed during the hospital encounter of 12/28/24    Cardiac Catheterization/Vascular Study    Conclusion  Table formatting from the original result was not included.  Cardiac Catheterization Operative Report    Rachael BARR Michael  [id number]  12/30/2024  @PCP@    She underwent cardiac catheterization.    Indications for the procedure include: Valvular heart disease with severe aortic stenosis.    Results for orders placed during the hospital encounter of 12/28/24    Adult Transthoracic Echo Complete W/ Cont if Necessary Per Protocol    Interpretation Summary    Left ventricular ejection fraction appears to be 46 - 50%.    Left ventricular diastolic function is consistent with (grade II w/high LAP) pseudonormalization.    The left atrial cavity is moderate to severely dilated.    Severe aortic valve stenosis is present.    Moderate mitral valve regurgitation is present.    Estimated right ventricular systolic pressure from tricuspid regurgitation is moderately elevated (45-55 mmHg).    Moderate to severe pulmonary hypertension is present.    No results found for this or any previous visit.        Procedure Details:  The risks, benefits, complications, treatment options, and expected outcomes were discussed with the patient. The patient and/or family concurred with the proposed plan, giving informed consent.    After informed consent the patient was brought to the  cath lab after appropriate IV hydration was begun and oral premedication was given. She was further sedated with fentanyl. She was prepped and draped in the usual manner. Using the modified Seldinger access technique, a 6 [place] sheath was placed in the femoral artery. A left heart catheterization with coronary arteriography was performed. Findings are discussed below.    7 [place] venous sheath was placed in the right femoral vein using ultrasound guidance and then we used a 7 [place] chest tube [place] catheter to the right heart catheterization and hemodynamic pressure measurements.    After the procedure was completed, sedation was stopped and the sheaths and catheters were all removed. Hemostasis was achieved per established hospital protocols.    Conscious sedation:  Conscious sedation was performed according to protocol.  I supervised and directed an independent trained observer with the assistance of monitoring the patient's level of consciousness and physiologic status throughout the procedure.  Intraoperative service time was 90 minutes.    Findings:    Hemodynamics Central aortic pressure systolic 146 diastolic 88 with a mean pressure of 150 mmHg      Right heart catheterization with hemodynamics      Pulmonary capillary wedge pressure was 20 mmHg  PA pressure systolic 45 diastolic 28 with a mean pressure of 36 mmHg  RV pressure systolic 40 mmHg  Right atrial pressure was 8 mmHg  PA saturation was 73%  Right atrial saturation was of 69%  Aortic saturation was 96%  Silvia equation cardiac output was 4.3 L/min  Silvia equation cardiac index was 2.6 L/min/m²  Pulmonary vascular resistance was 239 dynes per second  Systemic vascular resistance was 2002 dynes per second  Left Main Large-caliber vessel angiographically normal bifurcates into left anterior descending and left circumflex arteries  RCA Large-caliber vessel angiographically normal  LAD Large-caliber vessel angiographically normal  Circ Large-caliber  vessel angiographically normal  LV Not done  Coronary Dominance Right coronary artery    Estimated Blood Loss:  Minimal    Specimens: None    Complications:  None; patient tolerated the procedure well.    Disposition: PACU - hemodynamically stable.    Condition: stable    Impressions:  Normal coronaries  Moderate pulmonary hypertension    Recommendations:  Further evaluation for TAVR versus surgical AVR     Results for orders placed during the hospital encounter of 12/28/24    Adult Transthoracic Echo Complete W/ Cont if Necessary Per Protocol    Interpretation Summary    Left ventricular ejection fraction appears to be 46 - 50%.    Left ventricular diastolic function is consistent with (grade II w/high LAP) pseudonormalization.    The left atrial cavity is moderate to severely dilated.    Severe aortic valve stenosis is present.    Moderate mitral valve regurgitation is present.    Estimated right ventricular systolic pressure from tricuspid regurgitation is moderately elevated (45-55 mmHg).    Moderate to severe pulmonary hypertension is present.     Lab Results   Component Value Date    GLUCOSE 140 (H) 12/30/2024    BUN 26 (H) 12/30/2024    CREATININE 0.77 12/30/2024    EGFR 84.1 12/30/2024    BCR 33.8 (H) 12/30/2024    K 5.0 12/30/2024    CO2 22.0 12/30/2024    CALCIUM 9.9 12/30/2024    ALBUMIN 4.4 12/29/2024    BILITOT 0.5 12/29/2024    AST 24 12/29/2024    ALT 13 12/29/2024      Lab Results   Component Value Date    CHOL 248 (H) 12/28/2024    TRIG 87 12/28/2024    HDL 99 (H) 12/28/2024     (H) 12/28/2024      Lab Results   Component Value Date    TROPONINT 16 (H) 12/28/2024        Assessment & Plan       CHF (congestive heart failure)    Multifocal pneumonia    LBBB (left bundle branch block)        Makayla Driscoll MD  01/01/25  11:12 EST

## 2025-01-01 NOTE — PLAN OF CARE
Goal Outcome Evaluation:      Patient will discharge home awaiting dental care prior to procedure. Care ongoing.

## 2025-01-02 LAB
BACTERIA SPEC AEROBE CULT: NORMAL
BACTERIA SPEC AEROBE CULT: NORMAL

## 2025-01-02 NOTE — OUTREACH NOTE
Prep Survey      Flowsheet Row Responses   [facility] discharged from? [facility]   Is LACE score < 7 ? No   Eligibility Readm Mgmt   Discharge diagnosis CHF (congestive heart failure), heart cath   Does the patient have one of the following disease processes/diagnoses(primary or secondary)? CHF   Does the patient have Home health ordered? No   Is there a DME ordered? No   Prep survey completed? Yes            Caterina CHANG - Registered Nurse

## 2025-01-07 ENCOUNTER — READMISSION MANAGEMENT (OUTPATIENT)
Dept: CALL CENTER | Facility: HOSPITAL | Age: 70
End: 2025-01-07
Payer: MEDICARE

## 2025-01-07 ENCOUNTER — TELEPHONE (OUTPATIENT)
Dept: CARDIAC SURGERY | Facility: CLINIC | Age: 70
End: 2025-01-07
Payer: MEDICARE

## 2025-01-07 NOTE — OUTREACH NOTE
CHF Week 1 Survey      Flowsheet Row Responses   [facility] patient discharged from? [facility]   Does the patient have one of the following disease processes/diagnoses(primary or secondary)? CHF   CHF Week 1 attempt successful? Yes   Call start time 1453   Call end time 1501   Discharge diagnosis CHF (congestive heart failure), heart cath   Person spoke with today (if not patient) and relationship pt   Meds reviewed with patient/caregiver? Yes   Is the patient having any side effects they believe may be caused by any medication additions or changes? No   Does the patient have all medications ordered at discharge? Yes   Is the patient taking all medications as directed (includes completed medication regime)? Yes   Does the patient have a primary care provider?  Yes   Does the patient have an appointment with their PCP within 7 days of discharge? Greater than 7 days   What is preventing the patient from scheduling follow up appointments within 7 days of discharge? Earlier appointment not available   Has the patient kept scheduled appointments due by today? N/A   Pulse Ox monitoring None   Psychosocial issues? No   Did the patient receive a copy of their discharge instructions? Yes   Nursing interventions Reviewed instructions with patient   What is the patient's perception of their health status since discharge? Improving   Nursing interventions Nurse provided patient education   Is the patient able to teach back signs and symptoms of worsening condition? (i.e. weight gain, shortness of air, etc.) Yes   If the patient is a current smoker, are they able to teach back resources for cessation? Not a smoker   Is the patient/caregiver able to teach back the hierarchy of who to call/visit for symptoms/problems? PCP, Specialist, Home health nurse, Urgent Care, ED, 911 Yes   Is the patient able to teach back Heart Failure Zones? Yes   CHF Nursing Interventions Education provided on various zones   CHF Zone this Call Jose C  Zone   Green Zone Patient reports doing well, No new or worsening shortness of breath, Physical activity level is normal for you, No new swelling -  feet, ankles and legs look normal for you, No chest pain, Weight check stable   Green Zone Interventions Daily weight check, Meds as directed, Follow up visits planned    CHF Week 1 call completed? Yes   Is the patient interested in additional calls from an ambulatory ? No   Would this patient benefit from a Referral to [facility] Social Work? No   Wrap up additional comments ptdoing better, is having an aortic valve replaced soon.   Call end time 1501            LICHA DAS - Registered Nurse

## 2025-01-07 NOTE — TELEPHONE ENCOUNTER
Spoke to a representative at Dr. Mccann's office. Patient had tooth extracted on 1/3/25 and is cleared to proceed with surgery.

## 2025-01-08 ENCOUNTER — PREP FOR SURGERY (OUTPATIENT)
Dept: OTHER | Facility: HOSPITAL | Age: 70
End: 2025-01-08
Payer: MEDICARE

## 2025-01-08 DIAGNOSIS — I11.0 HYPERTENSIVE HEART DISEASE WITH HEART FAILURE: ICD-10-CM

## 2025-01-08 DIAGNOSIS — R55 SYNCOPE AND COLLAPSE: ICD-10-CM

## 2025-01-08 DIAGNOSIS — R79.9 ABNORMAL FINDING OF BLOOD CHEMISTRY, UNSPECIFIED: ICD-10-CM

## 2025-01-08 DIAGNOSIS — I35.0 NONRHEUMATIC AORTIC VALVE STENOSIS: Primary | ICD-10-CM

## 2025-01-08 DIAGNOSIS — R79.1 ABNORMAL COAGULATION PROFILE: ICD-10-CM

## 2025-01-08 DIAGNOSIS — R93.3 ABNORMAL FINDINGS ON DIAGNOSTIC IMAGING OF OTHER PARTS OF DIGESTIVE TRACT: ICD-10-CM

## 2025-01-08 RX ORDER — CHLORHEXIDINE GLUCONATE 500 MG/1
1 CLOTH TOPICAL EVERY 12 HOURS PRN
OUTPATIENT
Start: 2025-01-08

## 2025-01-08 RX ORDER — CHLORHEXIDINE GLUCONATE 500 MG/1
1 CLOTH TOPICAL EVERY 12 HOURS PRN
Status: CANCELLED | OUTPATIENT
Start: 2025-01-08

## 2025-01-08 RX ORDER — METOPROLOL TARTRATE 25 MG/1
12.5 TABLET, FILM COATED ORAL
OUTPATIENT
Start: 2025-01-09 | End: 2025-01-10

## 2025-01-08 RX ORDER — CHLORHEXIDINE GLUCONATE ORAL RINSE 1.2 MG/ML
15 SOLUTION DENTAL EVERY 12 HOURS
Status: CANCELLED | OUTPATIENT
Start: 2025-01-08 | End: 2025-01-09

## 2025-01-08 RX ORDER — CHLORHEXIDINE GLUCONATE ORAL RINSE 1.2 MG/ML
15 SOLUTION DENTAL ONCE
OUTPATIENT
Start: 2025-01-08 | End: 2025-01-08

## 2025-01-09 ENCOUNTER — TELEPHONE (OUTPATIENT)
Dept: CARDIAC SURGERY | Facility: CLINIC | Age: 70
End: 2025-01-09
Payer: MEDICARE

## 2025-01-09 NOTE — PAT
Called patient and spoke with both her and -we agreed to earlier start time due to multiple appts that day. Called office and Dr Mcbride's office has gotten dental clearance. Reviewed meds-antibiotic course completed tomorrow for teeth.

## 2025-01-09 NOTE — TELEPHONE ENCOUNTER
Spoke to patient with PAT and surgery instructions. Surgery is scheduled for 1- as a to follow case, arrival time is 0600. [facility] will reach out to her with her PAT time. She expressed a verbal understanding of these directions. Instrauacted to call back with any further questions.

## 2025-01-10 RX ORDER — CHLORHEXIDINE GLUCONATE ORAL RINSE 1.2 MG/ML
15 SOLUTION DENTAL EVERY 12 HOURS
Status: ACTIVE | OUTPATIENT
Start: 2025-01-10 | End: 2025-01-11

## 2025-01-10 RX ORDER — CHLORHEXIDINE GLUCONATE 500 MG/1
1 CLOTH TOPICAL EVERY 12 HOURS PRN
Status: ACTIVE | OUTPATIENT
Start: 2025-01-10

## 2025-01-13 ENCOUNTER — HOSPITAL ENCOUNTER (OUTPATIENT)
Dept: GENERAL RADIOLOGY | Facility: HOSPITAL | Age: 70
Discharge: HOME | End: 2025-01-13
Payer: MEDICARE

## 2025-01-13 ENCOUNTER — PRE-ADMISSION TESTING (OUTPATIENT)
Dept: PREADMISSION TESTING | Facility: HOSPITAL | Age: 70
DRG: 220 | End: 2025-01-13
Payer: MEDICARE

## 2025-01-13 ENCOUNTER — HOSPITAL ENCOUNTER (OUTPATIENT)
Dept: CARDIOLOGY | Facility: HOSPITAL | Age: 70
Discharge: HOME | End: 2025-01-13
Payer: MEDICARE

## 2025-01-13 ENCOUNTER — HOSPITAL ENCOUNTER (OUTPATIENT)
Dept: RESPIRATORY THERAPY | Facility: HOSPITAL | Age: 70
Discharge: HOME | End: 2025-01-13
Payer: MEDICARE

## 2025-01-13 ENCOUNTER — ANESTHESIA EVENT (OUTPATIENT)
Dept: PERIOP | Facility: HOSPITAL | Age: 70
End: 2025-01-13
Payer: MEDICARE

## 2025-01-13 ENCOUNTER — LAB (OUTPATIENT)
Dept: LAB | Facility: HOSPITAL | Age: 70
End: 2025-01-13
Payer: MEDICARE

## 2025-01-13 VITALS
HEART RATE: 75 BPM | DIASTOLIC BLOOD PRESSURE: 87 MMHG | TEMPERATURE: 97.7 F | SYSTOLIC BLOOD PRESSURE: 161 MMHG | OXYGEN SATURATION: 96 % | RESPIRATION RATE: 18 BRPM | WEIGHT: 147.2 LBS | BODY MASS INDEX: 31.76 KG/M2 | HEIGHT: 57 IN

## 2025-01-13 DIAGNOSIS — I35.0 NONRHEUMATIC AORTIC VALVE STENOSIS: ICD-10-CM

## 2025-01-13 DIAGNOSIS — I11.0 HYPERTENSIVE HEART DISEASE WITH HEART FAILURE: ICD-10-CM

## 2025-01-13 DIAGNOSIS — R93.3 ABNORMAL FINDINGS ON DIAGNOSTIC IMAGING OF OTHER PARTS OF DIGESTIVE TRACT: ICD-10-CM

## 2025-01-13 DIAGNOSIS — R79.9 ABNORMAL FINDING OF BLOOD CHEMISTRY, UNSPECIFIED: ICD-10-CM

## 2025-01-13 DIAGNOSIS — R79.1 ABNORMAL COAGULATION PROFILE: ICD-10-CM

## 2025-01-13 DIAGNOSIS — R55 SYNCOPE AND COLLAPSE: ICD-10-CM

## 2025-01-13 LAB
ABO GROUP BLD: NORMAL
ALBUMIN SERPL-MCNC: 4.2 G/DL (ref 3.5–5.2)
ALBUMIN/GLOB SERPL: 1.4 G/DL
ALP SERPL-CCNC: 82 U/L (ref 39–117)
ALT SERPL W P-5'-P-CCNC: 16 U/L (ref 1–33)
ANION GAP SERPL CALCULATED.3IONS-SCNC: 11.7 MMOL/L (ref 5–15)
APTT PPP: 25.2 SECONDS (ref 22.7–35.4)
ARTERIAL PATENCY WRIST A: POSITIVE
AST SERPL-CCNC: 25 U/L (ref 1–32)
ATMOSPHERIC PRESS: ABNORMAL MM[HG]
BACTERIA UR QL AUTO: ABNORMAL /HPF
BASE EXCESS BLDA CALC-SCNC: 0.3 MMOL/L (ref 0–3)
BASOPHILS # BLD AUTO: 0.07 10*3/MM3 (ref 0–0.2)
BASOPHILS NFR BLD AUTO: 0.8 % (ref 0–1.5)
BDY SITE: ABNORMAL
BH CV XLRA MEAS LEFT DIST CCA EDV: -24.7 CM/SEC
BH CV XLRA MEAS LEFT DIST CCA PSV: -73.5 CM/SEC
BH CV XLRA MEAS LEFT DIST ICA EDV: -20.4 CM/SEC
BH CV XLRA MEAS LEFT DIST ICA PSV: -58 CM/SEC
BH CV XLRA MEAS LEFT ICA/CCA RATIO: 0.8
BH CV XLRA MEAS LEFT PROX CCA EDV: 22.5 CM/SEC
BH CV XLRA MEAS LEFT PROX CCA PSV: 73.7 CM/SEC
BH CV XLRA MEAS LEFT PROX ECA PSV: -71.9 CM/SEC
BH CV XLRA MEAS LEFT PROX ICA EDV: -14.5 CM/SEC
BH CV XLRA MEAS LEFT PROX ICA PSV: -41.7 CM/SEC
BH CV XLRA MEAS LEFT PROX SCLA PSV: 112 CM/SEC
BH CV XLRA MEAS LEFT VERTEBRAL A PSV: 34.9 CM/SEC
BH CV XLRA MEAS RIGHT DIST CCA EDV: 24.1 CM/SEC
BH CV XLRA MEAS RIGHT DIST CCA PSV: 75.7 CM/SEC
BH CV XLRA MEAS RIGHT DIST ICA EDV: -14.7 CM/SEC
BH CV XLRA MEAS RIGHT DIST ICA PSV: -43.5 CM/SEC
BH CV XLRA MEAS RIGHT ICA/CCA RATIO: 0.8
BH CV XLRA MEAS RIGHT PROX CCA EDV: 14.7 CM/SEC
BH CV XLRA MEAS RIGHT PROX CCA PSV: 50.1 CM/SEC
BH CV XLRA MEAS RIGHT PROX ECA PSV: -65.4 CM/SEC
BH CV XLRA MEAS RIGHT PROX ICA EDV: -17.7 CM/SEC
BH CV XLRA MEAS RIGHT PROX ICA PSV: -59.5 CM/SEC
BH CV XLRA MEAS RIGHT PROX SCLA PSV: -133 CM/SEC
BH CV XLRA MEAS RIGHT VERTEBRAL A PSV: -36.4 CM/SEC
BILIRUB SERPL-MCNC: 0.4 MG/DL (ref 0–1.2)
BILIRUB UR QL STRIP: NEGATIVE
BLD GP AB SCN SERPL QL: NEGATIVE
BUN SERPL-MCNC: 15 MG/DL (ref 8–23)
BUN/CREAT SERPL: 21.4 (ref 7–25)
CALCIUM SPEC-SCNC: 10.2 MG/DL (ref 8.6–10.5)
CHLORIDE SERPL-SCNC: 107 MMOL/L (ref 98–107)
CHOLEST SERPL-MCNC: 254 MG/DL (ref 0–200)
CLARITY UR: CLEAR
CLOSE TME COLL+ADP + EPINEP PNL BLD: 96 % (ref 86–100)
CO2 BLDA-SCNC: 23.5 MMOL/L (ref 22–29)
CO2 SERPL-SCNC: 23.3 MMOL/L (ref 22–29)
COLOR UR: YELLOW
CREAT SERPL-MCNC: 0.7 MG/DL (ref 0.57–1)
DEPRECATED RDW RBC AUTO: 40.2 FL (ref 37–54)
EGFRCR SERPLBLD CKD-EPI 2021: 94.3 ML/MIN/1.73
EOSINOPHIL # BLD AUTO: 0.17 10*3/MM3 (ref 0–0.4)
EOSINOPHIL NFR BLD AUTO: 2 % (ref 0.3–6.2)
ERYTHROCYTE [DISTWIDTH] IN BLOOD BY AUTOMATED COUNT: 13.7 % (ref 12.3–15.4)
GLOBULIN UR ELPH-MCNC: 3 GM/DL
GLUCOSE SERPL-MCNC: 98 MG/DL (ref 65–99)
GLUCOSE UR STRIP-MCNC: NEGATIVE MG/DL
HBA1C MFR BLD: 5.49 % (ref 4.8–5.6)
HCO3 BLDA-SCNC: 22.6 MMOL/L (ref 21–28)
HCT VFR BLD AUTO: 45 % (ref 34–46.6)
HDLC SERPL-MCNC: 78 MG/DL (ref 40–60)
HEMODILUTION: NO
HGB BLD-MCNC: 14.8 G/DL (ref 12–15.9)
HGB UR QL STRIP.AUTO: NEGATIVE
HYALINE CASTS UR QL AUTO: ABNORMAL /LPF
IMM GRANULOCYTES # BLD AUTO: 0.03 10*3/MM3 (ref 0–0.05)
IMM GRANULOCYTES NFR BLD AUTO: 0.4 % (ref 0–0.5)
INR PPP: 0.98 (ref 0.9–1.1)
KETONES UR QL STRIP: NEGATIVE
LDLC SERPL CALC-MCNC: 156 MG/DL (ref 0–100)
LDLC/HDLC SERPL: 1.96 {RATIO}
LEFT ARM BP: NORMAL MMHG
LEUKOCYTE ESTERASE UR QL STRIP.AUTO: ABNORMAL
LYMPHOCYTES # BLD AUTO: 2.15 10*3/MM3 (ref 0.7–3.1)
LYMPHOCYTES NFR BLD AUTO: 25.1 % (ref 19.6–45.3)
MAGNESIUM SERPL-MCNC: 2.1 MG/DL (ref 1.6–2.4)
MCH RBC QN AUTO: 26.9 PG (ref 26.6–33)
MCHC RBC AUTO-ENTMCNC: 32.9 G/DL (ref 31.5–35.7)
MCV RBC AUTO: 81.7 FL (ref 79–97)
MODALITY: ABNORMAL
MONOCYTES # BLD AUTO: 0.78 10*3/MM3 (ref 0.1–0.9)
MONOCYTES NFR BLD AUTO: 9.1 % (ref 5–12)
MRSA DNA SPEC QL NAA+PROBE: NORMAL
NEUTROPHILS NFR BLD AUTO: 5.36 10*3/MM3 (ref 1.7–7)
NEUTROPHILS NFR BLD AUTO: 62.6 % (ref 42.7–76)
NITRITE UR QL STRIP: NEGATIVE
NRBC BLD AUTO-RTO: 0 /100 WBC (ref 0–0.2)
NT-PROBNP SERPL-MCNC: 1648 PG/ML (ref 0–900)
PCO2 BLDA: 29.9 MM HG (ref 35–48)
PH BLDA: 7.49 PH UNITS (ref 7.35–7.45)
PH UR STRIP.AUTO: 6.5 [PH] (ref 5–8)
PLATELET # BLD AUTO: 270 10*3/MM3 (ref 140–450)
PMV BLD AUTO: 10.4 FL (ref 6–12)
PO2 BLDA: 106.7 MM HG (ref 83–108)
POTASSIUM SERPL-SCNC: 4.1 MMOL/L (ref 3.5–5.2)
PROT SERPL-MCNC: 7.2 G/DL (ref 6–8.5)
PROT UR QL STRIP: NEGATIVE
PROTHROMBIN TIME: 13 SECONDS (ref 11.7–14.2)
QT INTERVAL: 465 MS
QTC INTERVAL: 492 MS
RBC # BLD AUTO: 5.51 10*6/MM3 (ref 3.77–5.28)
RBC # UR STRIP: ABNORMAL /HPF
REF LAB TEST METHOD: ABNORMAL
RH BLD: POSITIVE
RIGHT ARM BP: NORMAL MMHG
SAO2 % BLDCOA: 98.6 % (ref 94–98)
SODIUM SERPL-SCNC: 142 MMOL/L (ref 136–145)
SP GR UR STRIP: 1.01 (ref 1–1.03)
SQUAMOUS #/AREA URNS HPF: ABNORMAL /HPF
T&S EXPIRATION DATE: NORMAL
TRIGL SERPL-MCNC: 114 MG/DL (ref 0–150)
UROBILINOGEN UR QL STRIP: ABNORMAL
VLDLC SERPL-MCNC: 20 MG/DL (ref 5–40)
WBC # UR STRIP: ABNORMAL /HPF
WBC NRBC COR # BLD AUTO: 8.56 10*3/MM3 (ref 3.4–10.8)

## 2025-01-13 PROCEDURE — 94010 BREATHING CAPACITY TEST: CPT

## 2025-01-13 PROCEDURE — 80053 COMPREHEN METABOLIC PANEL: CPT

## 2025-01-13 PROCEDURE — 85610 PROTHROMBIN TIME: CPT

## 2025-01-13 PROCEDURE — 86901 BLOOD TYPING SEROLOGIC RH(D): CPT

## 2025-01-13 PROCEDURE — 86900 BLOOD TYPING SEROLOGIC ABO: CPT

## 2025-01-13 PROCEDURE — 83880 ASSAY OF NATRIURETIC PEPTIDE: CPT

## 2025-01-13 PROCEDURE — 80061 LIPID PANEL: CPT

## 2025-01-13 PROCEDURE — 71046 X-RAY EXAM CHEST 2 VIEWS: CPT

## 2025-01-13 PROCEDURE — 82803 BLOOD GASES ANY COMBINATION: CPT | Performed by: PHYSICIAN ASSISTANT

## 2025-01-13 PROCEDURE — 93005 ELECTROCARDIOGRAM TRACING: CPT | Performed by: PHYSICIAN ASSISTANT

## 2025-01-13 PROCEDURE — 85730 THROMBOPLASTIN TIME PARTIAL: CPT

## 2025-01-13 PROCEDURE — 86850 RBC ANTIBODY SCREEN: CPT

## 2025-01-13 PROCEDURE — S0260 H&P FOR SURGERY: HCPCS | Performed by: THORACIC SURGERY (CARDIOTHORACIC VASCULAR SURGERY)

## 2025-01-13 PROCEDURE — 81001 URINALYSIS AUTO W/SCOPE: CPT

## 2025-01-13 PROCEDURE — 85025 COMPLETE CBC W/AUTO DIFF WBC: CPT

## 2025-01-13 PROCEDURE — 83735 ASSAY OF MAGNESIUM: CPT

## 2025-01-13 PROCEDURE — 36600 WITHDRAWAL OF ARTERIAL BLOOD: CPT | Performed by: PHYSICIAN ASSISTANT

## 2025-01-13 PROCEDURE — 87641 MR-STAPH DNA AMP PROBE: CPT

## 2025-01-13 PROCEDURE — 94727 GAS DIL/WSHOT DETER LNG VOL: CPT

## 2025-01-13 PROCEDURE — 36415 COLL VENOUS BLD VENIPUNCTURE: CPT

## 2025-01-13 PROCEDURE — 85576 BLOOD PLATELET AGGREGATION: CPT

## 2025-01-13 PROCEDURE — 93880 EXTRACRANIAL BILAT STUDY: CPT | Performed by: SURGERY

## 2025-01-13 PROCEDURE — 86923 COMPATIBILITY TEST ELECTRIC: CPT

## 2025-01-13 PROCEDURE — 93880 EXTRACRANIAL BILAT STUDY: CPT

## 2025-01-13 PROCEDURE — 94729 DIFFUSING CAPACITY: CPT

## 2025-01-13 PROCEDURE — 83036 HEMOGLOBIN GLYCOSYLATED A1C: CPT

## 2025-01-13 NOTE — H&P
[facility]        Patient Care Team:  Candy Mayberry MD as PCP - General (Family Medicine)  Provider, No Known as PCP - Family Medicine     Chief complaint  Aortic stenosis        Subjective  History of Present Illness  Patient is a 69 y.o. female but does not routinely take any medications at home.  Presented to the ED with worsening shortness of breath over the last week.  It was worse at night when laying flat.  She became dyspneic with minimal activity.  She also complained of fatigue and lower extrem edema.  She denied chest pain, palpitations, syncope, fever/chills, or nausea/vomiting.  In the ED her troponin was 19 and BNP 3K.  Patient was concern for pneumonia as she recently had a family member with pneumonia.  She was given a steroids and antibiotics that has since been de-escalated.  She had a pronounced murmur and echo was completed.  Echo showed severe aortic stenosis, moderate mitral regurgitation, EF 46 to 50%.  She underwent cardiac catheterization that showed no significant coronary artery disease.  She is referred for surgical evaluation of her valvular heart disease.     Review of Systems   Constitutional:  Positive for fatigue.   Respiratory:  Positive for shortness of breath.    Cardiovascular:  Positive for leg swelling.   All other systems reviewed and are negative.        Medical History        Past Medical History:   Diagnosis Date    Hypertension           Surgical History   History reviewed. No pertinent surgical history.     History reviewed. No pertinent family history.  Social History   Social History            Tobacco Use    Smoking status: Never       Passive exposure: Never    Smokeless tobacco: Never   Vaping Use    Vaping status: Never Used   Substance Use Topics    Alcohol use: Never    Drug use: Never         Prescriptions Prior to Admission   No medications prior to admission.           Scheduled Medication   atorvastatin, 20 mg, Oral, Nightly  furosemide,  "40 mg, Intravenous, Daily  pantoprazole, 40 mg, Intravenous, Q AM  sodium chloride, 10 mL, Intravenous, Q12H         Allergies:  Sulfa antibiotics and Penicillins        Objective  Vital Signs  Temp:  [98.2 °F (36.8 °C)-99 °F (37.2 °C)] 98.3 °F (36.8 °C)  Heart Rate:  [] 96  Resp:  [14-24] 18  BP: (127-165)/() 137/83     Flowsheet Rows       Flowsheet Row First Filed Value   Admission Height 144.8 cm (57\") Documented at 12/28/2024 0100   Admission Weight 65.8 kg (145 lb) Documented at 12/28/2024 0100             144.8 cm (57\")     Physical Exam  Constitutional:       General: She is not in acute distress.     Appearance: Normal appearance.   HENT:      Head: Normocephalic and atraumatic.      Mouth/Throat:      Mouth: Mucous membranes are moist.      Pharynx: Oropharynx is clear.   Cardiovascular:      Rate and Rhythm: Normal rate and regular rhythm.      Heart sounds: Murmur heard.   Pulmonary:      Effort: Pulmonary effort is normal. No respiratory distress.   Abdominal:      General: There is distension.      Tenderness: There is no abdominal tenderness.   Musculoskeletal:         General: Swelling present. Normal range of motion.   Skin:     General: Skin is warm and dry.   Neurological:      General: No focal deficit present.      Mental Status: She is alert and oriented to person, place, and time.   Psychiatric:         Mood and Affect: Mood normal.         Behavior: Behavior normal.         Thought Content: Thought content normal.         Judgment: Judgment normal.            Results Review:   Lab Results (last 24 hours)         Procedure Component Value Units Date/Time     POC Chem 8, arterial (ISTAT) [[id number]]  (Abnormal) Collected: 12/30/24 1034     Specimen: Arterial Blood Updated: 12/30/24 1207       Ionized Calcium 1.32 mmol/L         pH, Arterial 7.380 pH units         pCO2, Arterial 37.9 mm Hg         pO2, Arterial 81.0 mm Hg         HCO3, Arterial 22.7 mmol/L         Base Excess, " Arterial <0.0 mmol/L         Base Deficit --       O2 Saturation, Arterial 96.0 %         Glucose 148 mg/dL         Comment: Serial Number: [id number]:  [id number]          Sodium 136 mmol/L         POC Potassium 4.0 mmol/L         Hematocrit 38 %         Hemoglobin 12.9 g/dL         CO2 Content 24 mmol/L       POC Chem 8, arterial (ISTAT) [[id number]]  (Abnormal) Collected: 12/30/24 1027     Specimen: Arterial Blood Updated: 12/30/24 1207       Ionized Calcium 1.33 mmol/L         pH, Arterial 7.380 pH units         pCO2, Arterial 38.0 mm Hg         pO2, Arterial 56.0 mm Hg         HCO3, Arterial 22.8 mmol/L         Base Excess, Arterial <0.0 mmol/L         Base Deficit --       O2 Saturation, Arterial 89.0 %         Glucose 152 mg/dL         Comment: Serial Number: [id number]:  [id number]          Sodium 138 mmol/L         POC Potassium 4.0 mmol/L         Hematocrit 40 %         Hemoglobin 13.6 g/dL         CO2 Content 24 mmol/L       POC Chem Panel [[id number]]  (Abnormal) Collected: 12/30/24 1023     Specimen: Venous Blood Updated: 12/30/24 1207       Glucose 155 mg/dL         Comment: Serial Number: [id number]:  [id number]          Sodium 138 mmol/L         POC Potassium 4.2 mmol/L         Ionized Calcium 1.32 mmol/L         Hematocrit 41 %         Hemoglobin 13.9 g/dL         pH, Venous 7.430 pH Units         pCO2, Venous 36.6 mm Hg         CO2 CONTENT  25 mmol/L         HCO3, Venous 24.3 mmol/L         Base Excess, Venous 0.0 mmol/L         Base Deficit --       O2 Saturation, Venous --       pO2, Venous 37.0 mm Hg       POC Chem Panel [[id number]]  (Abnormal) Collected: 12/30/24 1024     Specimen: Venous Blood Updated: 12/30/24 1155       Glucose 153 mg/dL         Comment: Serial Number: [id number]:  [id number]          Sodium 138 mmol/L         POC Potassium 4.0 mmol/L         Ionized Calcium 1.31 mmol/L         Hematocrit 40 %         Hemoglobin 13.6 g/dL         pH, Venous 7.390 pH Units         pCO2, Venous  39.7 mm Hg         CO2 CONTENT  25 mmol/L         HCO3, Venous 24.0 mmol/L         Base Excess, Venous <0.0 mmol/L         Base Deficit --       O2 Saturation, Venous --       pO2, Venous 36.0 mm Hg       Basic Metabolic Panel [[id number]]  (Abnormal) Collected: 12/30/24 0423     Specimen: Blood Updated: 12/30/24 0539       Glucose 140 mg/dL         BUN 26 mg/dL         Creatinine 0.77 mg/dL         Sodium 137 mmol/L         Potassium 5.0 mmol/L         Comment: Result checked             Chloride 104 mmol/L         CO2 22.0 mmol/L         Calcium 9.9 mg/dL         BUN/Creatinine Ratio 33.8       Anion Gap 11.0 mmol/L         eGFR 84.1 mL/min/1.73       Narrative:       GFR Categories in Chronic Kidney Disease (CKD)                         GFR Category          GFR (mL/min/1.73)    Interpretation  G1                       90 or greater              Normal or high (1)  G2                               60-89                Mild decrease (1)  G3a                   45-59                Mild to moderate decrease  G3b                   30-44                Moderate to severe decrease  G4                    15-29                Severe decrease  G5                    14 or less           Kidney failure                                                 (1)In the absence of evidence of kidney disease, neither GFR category G1 or G2 fulfill the criteria for CKD.     eGFR calculation 2021 CKD-EPI creatinine equation, which does not include race as a factor     Blood Culture - Blood, Arm, Left [[id number]]  (Normal) Collected: 12/28/24 0318     Specimen: Blood from Arm, Left Updated: 12/30/24 0330       Blood Culture No growth at 2 days     Narrative:       Less than seven (7) mL's of blood was collected.  Insufficient quantity may yield false negative results.     Blood Culture - Blood, Arm, Right [[id number]]  (Normal) Collected: 12/28/24 0311     Specimen: Blood from Arm, Right Updated: 12/30/24 0315       Blood Culture No growth  at 2 days                            Assessment & Plan    CHF (congestive heart failure)    Multifocal pneumonia    LBBB (left bundle branch block)        Assessment & Plan     - severe aortic stenosis  - moderate mitral regurg  - PHTN  - acute on chronic diastolic heart failure, HFprEF 45-50%  - HTN  - HLD  - h/o sternal fracture, pt reports enlarged aorta on CT     Patient seen and discussed with Dr. Mcbride.  She presented with shortness of breath and was found to have severe aortic stenosis and moderate mitral regurg. Cath showed normal coronaries and PHTN. She is scheduled to have BENJAMIN tomorrow for further evaluation.  She reports a history of a sternal fracture and on that CT it showed an enlarged aorta.  Will order a noncontrast chest CT to evaluate. Further plan depending on results of above.        Thank you for allowing us to participate in the care of this patient.       Bakari Solomon PA-C  12/30/24  15:57 EST     Addendum  Patient was seen and examined by me, agree with the findings above.  I have reviewed the echocardiogram, cardiac cath and chest CT and interpreted results myself.  He has severe aortic stenosis and mild to moderate mitral regurgitation.  He has pulmonary hypertension.  I recommend aortic valve replacement to prolong survival, symptomatic relief and to prevent adverse events.  I had discussed the risk (STS calculated), benefits and terms of surgery and she wishes to proceed.  She has a tooth with infection and she will contact her dentist for early repair and we will book surgery as an outpatient after that is taken care of.  I have offered him to see them in office after the dental visit or she has scheduled the surgery at that time.  The office will follow-up with them to schedule surgery based on the dentistry timing  Zac Mcbride MD      HISTORY & PHYSICAL UPDATE 1/13/25:    Patient presents to Pre-Admission Testing today in preparation for aortic valve replacement with Dr. Mcbride on  1/14/25. She denies recent illness since last being seen on 12/30/24, but reports having a tooth extraction on 1/3/25. She states the dentist told her he saw no signs of infection and she completed a course of prophylactic antibiotics last week. She states her mouth has healed and she denies dental pain or other issues. Labs reviewed. UA with trace leukocytes and 3-5 WBC, but negative for bacteria and nitrites. All questions were answered to patient's satisfaction and she states she feels ready for surgery tomorrow. Plan for AVR with a bioprosthetic valve tomorrow with Dr. Mcbride.

## 2025-01-13 NOTE — PAT
Emeergency contact=  Pa Rodriguez [phone];  Sister Loren Allen [phone]    5MW=6-5-5    Password= [email or web address]

## 2025-01-14 ENCOUNTER — ANESTHESIA (OUTPATIENT)
Dept: PERIOP | Facility: HOSPITAL | Age: 70
End: 2025-01-14
Payer: MEDICARE

## 2025-01-14 ENCOUNTER — OFFICE VISIT (OUTPATIENT)
Dept: PERIOP | Facility: HOSPITAL | Age: 70
DRG: 220 | End: 2025-01-14
Payer: MEDICARE

## 2025-01-14 ENCOUNTER — HOSPITAL ENCOUNTER (INPATIENT)
Facility: HOSPITAL | Age: 70
LOS: 4 days | Discharge: HOME HEALTH | DRG: 220 | End: 2025-01-18
Attending: THORACIC SURGERY (CARDIOTHORACIC VASCULAR SURGERY) | Admitting: THORACIC SURGERY (CARDIOTHORACIC VASCULAR SURGERY)
Payer: MEDICARE

## 2025-01-14 ENCOUNTER — READMISSION MANAGEMENT (OUTPATIENT)
Dept: CALL CENTER | Facility: HOSPITAL | Age: 70
End: 2025-01-14
Payer: MEDICARE

## 2025-01-14 ENCOUNTER — APPOINTMENT (OUTPATIENT)
Dept: GENERAL RADIOLOGY | Facility: HOSPITAL | Age: 70
DRG: 220 | End: 2025-01-14
Payer: MEDICARE

## 2025-01-14 DIAGNOSIS — Z95.2 S/P AVR: Primary | ICD-10-CM

## 2025-01-14 DIAGNOSIS — I35.0 NONRHEUMATIC AORTIC VALVE STENOSIS: ICD-10-CM

## 2025-01-14 LAB
ACT BLD: 106 SECONDS (ref 89–137)
ACT BLD: 124 SECONDS (ref 89–137)
ACT BLD: 360 SECONDS (ref 89–137)
ACT BLD: 400 SECONDS (ref 89–137)
ACT BLD: 475 SECONDS (ref 89–137)
ACT BLD: 487 SECONDS (ref 89–137)
ALBUMIN SERPL-MCNC: 4.6 G/DL (ref 3.5–5.2)
ANION GAP SERPL CALCULATED.3IONS-SCNC: 10.3 MMOL/L (ref 5–15)
APTT PPP: 30.4 SECONDS (ref 22.7–35.4)
APTT PPP: 32.6 SECONDS (ref 22.7–35.4)
ARTERIAL PATENCY WRIST A: ABNORMAL
ATMOSPHERIC PRESS: ABNORMAL MM[HG]
BASE DEFICIT: ABNORMAL
BASE EXCESS BLDA CALC-SCNC: -0.4 MMOL/L (ref 0–3)
BASE EXCESS BLDA CALC-SCNC: -1.1 MMOL/L (ref 0–3)
BASE EXCESS BLDA CALC-SCNC: -1.5 MMOL/L (ref 0–3)
BASE EXCESS BLDA CALC-SCNC: -1.8 MMOL/L (ref 0–3)
BASE EXCESS BLDA CALC-SCNC: -2 MMOL/L (ref 0–3)
BASE EXCESS BLDA CALC-SCNC: 0.4 MMOL/L (ref 0–3)
BASE EXCESS BLDA CALC-SCNC: 4 MMOL/L (ref 0–3)
BASE EXCESS BLDA CALC-SCNC: 8 MMOL/L (ref 0–3)
BASE EXCESS BLDA CALC-SCNC: <0 MMOL/L (ref 0–3)
BASE EXCESS BLDA CALC-SCNC: <0 MMOL/L (ref 0–3)
BDY SITE: ABNORMAL
BH BB BLOOD EXPIRATION DATE: NORMAL
BH BB BLOOD EXPIRATION DATE: NORMAL
BH BB BLOOD TYPE BARCODE: 6200
BH BB BLOOD TYPE BARCODE: 6200
BH BB DISPENSE STATUS: NORMAL
BH BB DISPENSE STATUS: NORMAL
BH BB PRODUCT CODE: NORMAL
BH BB PRODUCT CODE: NORMAL
BH BB UNIT NUMBER: NORMAL
BH BB UNIT NUMBER: NORMAL
BUN SERPL-MCNC: 11 MG/DL (ref 8–23)
BUN/CREAT SERPL: 15.3 (ref 7–25)
CA-I BLDA-SCNC: 0.97 MMOL/L (ref 1.12–1.32)
CA-I BLDA-SCNC: 1.19 MMOL/L (ref 1.12–1.32)
CA-I BLDA-SCNC: 1.29 MMOL/L (ref 1.12–1.32)
CA-I BLDA-SCNC: 1.33 MMOL/L (ref 1.15–1.33)
CA-I BLDA-SCNC: 1.35 MMOL/L (ref 1.15–1.33)
CA-I BLDA-SCNC: 1.37 MMOL/L (ref 1.15–1.33)
CA-I BLDA-SCNC: 1.52 MMOL/L (ref 1.12–1.32)
CA-I SERPL ISE-MCNC: 1.26 MMOL/L (ref 1.15–1.3)
CALCIUM SPEC-SCNC: 10 MG/DL (ref 8.6–10.5)
CHLORIDE SERPL-SCNC: 113 MMOL/L (ref 98–107)
CLOSE TME COLL+ADP + EPINEP PNL BLD: 94 % (ref 86–100)
CO2 BLDA-SCNC: 23 MMOL/L (ref 23–27)
CO2 BLDA-SCNC: 23.7 MMOL/L (ref 22–29)
CO2 BLDA-SCNC: 24 MMOL/L (ref 23–27)
CO2 BLDA-SCNC: 24.1 MMOL/L (ref 22–29)
CO2 BLDA-SCNC: 25.1 MMOL/L (ref 22–29)
CO2 BLDA-SCNC: 26.1 MMOL/L (ref 22–29)
CO2 BLDA-SCNC: 26.5 MMOL/L (ref 22–29)
CO2 BLDA-SCNC: 29 MMOL/L (ref 23–27)
CO2 BLDA-SCNC: 33 MMOL/L (ref 23–27)
CO2 SERPL-SCNC: 23.7 MMOL/L (ref 22–29)
CREAT BLDA-MCNC: 0.74 MG/DL (ref 0.6–1.3)
CREAT SERPL-MCNC: 0.72 MG/DL (ref 0.57–1)
CROSSMATCH INTERPRETATION: NORMAL
CROSSMATCH INTERPRETATION: NORMAL
DEPRECATED RDW RBC AUTO: 40.8 FL (ref 37–54)
DEPRECATED RDW RBC AUTO: 41.5 FL (ref 37–54)
EGFRCR SERPLBLD CKD-EPI 2021: 88.3 ML/MIN/1.73
EGFRCR SERPLBLD CKD-EPI 2021: 91.2 ML/MIN/1.73
EOSINOPHIL # BLD MANUAL: 0.18 10*3/MM3 (ref 0–0.4)
EOSINOPHIL NFR BLD MANUAL: 1 % (ref 0.3–6.2)
ERYTHROCYTE [DISTWIDTH] IN BLOOD BY AUTOMATED COUNT: 13.5 % (ref 12.3–15.4)
ERYTHROCYTE [DISTWIDTH] IN BLOOD BY AUTOMATED COUNT: 13.5 % (ref 12.3–15.4)
FIBRINOGEN PPP-MCNC: 213 MG/DL (ref 219–464)
GLUCOSE BLDC GLUCOMTR-MCNC: 102 MG/DL (ref 70–105)
GLUCOSE BLDC GLUCOMTR-MCNC: 104 MG/DL (ref 70–105)
GLUCOSE BLDC GLUCOMTR-MCNC: 116 MG/DL (ref 70–105)
GLUCOSE BLDC GLUCOMTR-MCNC: 119 MG/DL (ref 74–100)
GLUCOSE BLDC GLUCOMTR-MCNC: 128 MG/DL (ref 70–105)
GLUCOSE BLDC GLUCOMTR-MCNC: 128 MG/DL (ref 70–105)
GLUCOSE BLDC GLUCOMTR-MCNC: 132 MG/DL (ref 74–100)
GLUCOSE BLDC GLUCOMTR-MCNC: 132 MG/DL (ref 74–100)
GLUCOSE BLDC GLUCOMTR-MCNC: 138 MG/DL (ref 74–100)
GLUCOSE BLDC GLUCOMTR-MCNC: 138 MG/DL (ref 74–100)
GLUCOSE BLDC GLUCOMTR-MCNC: 152 MG/DL (ref 70–105)
GLUCOSE BLDC GLUCOMTR-MCNC: 157 MG/DL (ref 70–105)
GLUCOSE BLDC GLUCOMTR-MCNC: 186 MG/DL (ref 74–100)
GLUCOSE BLDC GLUCOMTR-MCNC: 186 MG/DL (ref 74–100)
GLUCOSE BLDC GLUCOMTR-MCNC: 195 MG/DL (ref 74–100)
GLUCOSE SERPL-MCNC: 132 MG/DL (ref 65–99)
HCO3 BLDA-SCNC: 21.7 MMOL/L (ref 22–26)
HCO3 BLDA-SCNC: 22.6 MMOL/L (ref 21–28)
HCO3 BLDA-SCNC: 22.8 MMOL/L (ref 22–26)
HCO3 BLDA-SCNC: 23.8 MMOL/L (ref 21–28)
HCO3 BLDA-SCNC: 24.1 MMOL/L (ref 21–28)
HCO3 BLDA-SCNC: 24.9 MMOL/L (ref 21–28)
HCO3 BLDA-SCNC: 25.1 MMOL/L (ref 21–28)
HCO3 BLDA-SCNC: 27.7 MMOL/L (ref 22–26)
HCO3 BLDA-SCNC: 31.8 MMOL/L (ref 22–26)
HCO3 MIXED: 22.9 MMOL/L (ref 21–29)
HCT VFR BLD AUTO: 25.1 % (ref 34–46.6)
HCT VFR BLD AUTO: 38.1 % (ref 34–46.6)
HCT VFR BLDA CALC: 24 % (ref 38–51)
HCT VFR BLDA CALC: 26 % (ref 38–51)
HCT VFR BLDA CALC: 29 % (ref 38–51)
HCT VFR BLDA CALC: 33 % (ref 38–51)
HCT VFR BLDA CALC: 33 % (ref 38–51)
HCT VFR BLDA CALC: 36 % (ref 38–51)
HCT VFR BLDA CALC: 37 % (ref 38–51)
HEMODILUTION: NO
HGB BLD-MCNC: 12.6 G/DL (ref 12–15.9)
HGB BLD-MCNC: 8.4 G/DL (ref 12–15.9)
HGB BLDA-MCNC: 11.1 G/DL (ref 12–17)
HGB BLDA-MCNC: 11.3 G/DL (ref 12–17)
HGB BLDA-MCNC: 12.2 G/DL (ref 12–17)
HGB BLDA-MCNC: 12.6 G/DL (ref 12–17)
HGB BLDA-MCNC: 8.2 G/DL (ref 12–17)
HGB BLDA-MCNC: 8.8 G/DL (ref 12–17)
HGB BLDA-MCNC: 9.9 G/DL (ref 12–17)
INHALED O2 CONCENTRATION: 36 %
INHALED O2 CONCENTRATION: 40 %
INHALED O2 CONCENTRATION: 70 %
INR PPP: 1.53 (ref 0.9–1.1)
INR PPP: 1.81 (ref 0.9–1.1)
LARGE PLATELETS: ABNORMAL
LYMPHOCYTES # BLD MANUAL: 1.47 10*3/MM3 (ref 0.7–3.1)
LYMPHOCYTES NFR BLD MANUAL: 5 % (ref 5–12)
Lab: ABNORMAL
MCH RBC QN AUTO: 27.3 PG (ref 26.6–33)
MCH RBC QN AUTO: 28.1 PG (ref 26.6–33)
MCHC RBC AUTO-ENTMCNC: 33.1 G/DL (ref 31.5–35.7)
MCHC RBC AUTO-ENTMCNC: 33.5 G/DL (ref 31.5–35.7)
MCV RBC AUTO: 82.5 FL (ref 79–97)
MCV RBC AUTO: 83.9 FL (ref 79–97)
MODALITY: ABNORMAL
MONOCYTES # BLD: 0.92 10*3/MM3 (ref 0.1–0.9)
NEUTROPHILS # BLD AUTO: 15.82 10*3/MM3 (ref 1.7–7)
NEUTROPHILS NFR BLD MANUAL: 85 % (ref 42.7–76)
NEUTS BAND NFR BLD MANUAL: 1 % (ref 0–5)
NOTIFIED WHO: ABNORMAL
O2 SATURATION MIXED: 75.9 %
PCO2 BLDA: 36.4 MM HG (ref 35–48)
PCO2 BLDA: 37.5 MM HG (ref 35–45)
PCO2 BLDA: 38.7 MM HG (ref 35–48)
PCO2 BLDA: 38.9 MM HG (ref 35–45)
PCO2 BLDA: 39.2 MM HG (ref 35–45)
PCO2 BLDA: 39.8 MM HG (ref 35–48)
PCO2 BLDA: 43.6 MM HG (ref 35–48)
PCO2 BLDA: 44.2 MM HG (ref 35–48)
PCO2 BLDA: 47.8 MM HG (ref 35–45)
PCO2 MIXED: 38.5 MMHG (ref 35–51)
PEEP RESPIRATORY: 5 CM[H2O]
PEEP RESPIRATORY: 8 CM[H2O]
PEEP RESPIRATORY: 8 CM[H2O]
PH BLDA: 7.35 PH UNITS (ref 7.35–7.45)
PH BLDA: 7.36 PH UNITS (ref 7.35–7.45)
PH BLDA: 7.36 PH UNITS (ref 7.35–7.45)
PH BLDA: 7.39 PH UNITS (ref 7.35–7.45)
PH BLDA: 7.39 PH UNITS (ref 7.35–7.45)
PH BLDA: 7.4 PH UNITS (ref 7.35–7.45)
PH BLDA: 7.42 PH UNITS (ref 7.35–7.45)
PH BLDA: 7.43 PH UNITS (ref 7.35–7.45)
PH BLDA: 7.46 PH UNITS (ref 7.35–7.45)
PH MIXED: 7.38 PH UNITS (ref 7.32–7.45)
PHOSPHATE SERPL-MCNC: 1.9 MG/DL (ref 2.5–4.5)
PLATELET # BLD AUTO: 130 10*3/MM3 (ref 140–450)
PLATELET # BLD AUTO: 136 10*3/MM3 (ref 140–450)
PMV BLD AUTO: 10.5 FL (ref 6–12)
PMV BLD AUTO: 10.5 FL (ref 6–12)
PO2 BLD: 301 MM[HG] (ref 0–500)
PO2 BLD: 336 MM[HG] (ref 0–500)
PO2 BLD: 344 MM[HG] (ref 0–500)
PO2 BLD: 370 MM[HG] (ref 0–500)
PO2 BLD: 428 MM[HG] (ref 0–500)
PO2 BLDA: 133.2 MM HG (ref 83–108)
PO2 BLDA: 134.3 MM HG (ref 83–108)
PO2 BLDA: 137.7 MM HG (ref 83–108)
PO2 BLDA: 171.1 MM HG (ref 83–108)
PO2 BLDA: 210.6 MM HG (ref 83–108)
PO2 BLDA: 326 MM HG (ref 80–105)
PO2 BLDA: 368 MM HG (ref 80–105)
PO2 BLDA: 417 MM HG (ref 80–105)
PO2 BLDA: 49 MM HG (ref 80–105)
PO2 MIXED: 41.3 MMHG
POTASSIUM BLDA-SCNC: 3.4 MMOL/L (ref 3.5–4.9)
POTASSIUM BLDA-SCNC: 3.7 MMOL/L (ref 3.5–4.5)
POTASSIUM BLDA-SCNC: 3.9 MMOL/L (ref 3.5–4.9)
POTASSIUM BLDA-SCNC: 3.9 MMOL/L (ref 3.5–4.9)
POTASSIUM BLDA-SCNC: 4.3 MMOL/L (ref 3.5–4.9)
POTASSIUM SERPL-SCNC: 3.6 MMOL/L (ref 3.5–5.2)
POTASSIUM SERPL-SCNC: 3.7 MMOL/L (ref 3.5–5.2)
PROTHROMBIN TIME: 18.5 SECONDS (ref 11.7–14.2)
PROTHROMBIN TIME: 21.1 SECONDS (ref 11.7–14.2)
QT INTERVAL: 449 MS
QTC INTERVAL: 496 MS
RBC # BLD AUTO: 2.99 10*6/MM3 (ref 3.77–5.28)
RBC # BLD AUTO: 4.62 10*6/MM3 (ref 3.77–5.28)
RBC MORPH BLD: NORMAL
READ BACK: ABNORMAL
RESPIRATORY RATE: 12
SAO2 % BLDCOA: 100 % (ref 95–98)
SAO2 % BLDCOA: 85 % (ref 95–98)
SAO2 % BLDCOA: 98.9 % (ref 94–98)
SAO2 % BLDCOA: 99.1 % (ref 94–98)
SAO2 % BLDCOA: 99.1 % (ref 94–98)
SAO2 % BLDCOA: 99.5 % (ref 94–98)
SAO2 % BLDCOA: 99.8 % (ref 94–98)
SCAN SLIDE: NORMAL
SET MECH RESP RATE: 12
SODIUM BLD-SCNC: 135 MMOL/L (ref 138–146)
SODIUM BLD-SCNC: 142 MMOL/L (ref 138–146)
SODIUM BLD-SCNC: 142 MMOL/L (ref 138–146)
SODIUM BLD-SCNC: 146 MMOL/L (ref 138–146)
SODIUM BLD-SCNC: 147 MMOL/L (ref 138–146)
SODIUM BLD-SCNC: 148 MMOL/L (ref 138–146)
SODIUM BLD-SCNC: 152 MMOL/L (ref 138–146)
SODIUM SERPL-SCNC: 147 MMOL/L (ref 136–145)
TOXIC GRANULATION: ABNORMAL
UNIT  ABO: NORMAL
UNIT  ABO: NORMAL
UNIT  RH: NORMAL
UNIT  RH: NORMAL
VARIANT LYMPHS NFR BLD MANUAL: 8 % (ref 19.6–45.3)
VENTILATOR MODE: ABNORMAL
VENTILATOR MODE: AC
VT ON VENT VENT: 550 ML
WBC NRBC COR # BLD AUTO: 10.87 10*3/MM3 (ref 3.4–10.8)
WBC NRBC COR # BLD AUTO: 18.39 10*3/MM3 (ref 3.4–10.8)

## 2025-01-14 PROCEDURE — 25010000002 NICARDIPINE 2.5 MG/ML SOLUTION: Performed by: ANESTHESIOLOGY

## 2025-01-14 PROCEDURE — C1889 IMPLANT/INSERT DEVICE, NOC: HCPCS | Performed by: THORACIC SURGERY (CARDIOTHORACIC VASCULAR SURGERY)

## 2025-01-14 PROCEDURE — 25010000002 ACETAMINOPHEN 10 MG/ML SOLUTION: Performed by: NURSE PRACTITIONER

## 2025-01-14 PROCEDURE — 84132 ASSAY OF SERUM POTASSIUM: CPT | Performed by: THORACIC SURGERY (CARDIOTHORACIC VASCULAR SURGERY)

## 2025-01-14 PROCEDURE — 82803 BLOOD GASES ANY COMBINATION: CPT

## 2025-01-14 PROCEDURE — 25010000002 PROTAMINE SULFATE PER 10 MG: Performed by: ANESTHESIOLOGY

## 2025-01-14 PROCEDURE — 25010000002 CEFAZOLIN PER 500 MG: Performed by: ANESTHESIOLOGY

## 2025-01-14 PROCEDURE — 25010000002 MORPHINE PER 10 MG: Performed by: NURSE PRACTITIONER

## 2025-01-14 PROCEDURE — P9045 ALBUMIN (HUMAN), 5%, 250 ML: HCPCS | Performed by: NURSE PRACTITIONER

## 2025-01-14 PROCEDURE — 25010000002 PHENYLEPHRINE 10 MG/ML SOLUTION: Performed by: ANESTHESIOLOGY

## 2025-01-14 PROCEDURE — C1713 ANCHOR/SCREW BN/BN,TIS/BN: HCPCS | Performed by: THORACIC SURGERY (CARDIOTHORACIC VASCULAR SURGERY)

## 2025-01-14 PROCEDURE — 85730 THROMBOPLASTIN TIME PARTIAL: CPT | Performed by: THORACIC SURGERY (CARDIOTHORACIC VASCULAR SURGERY)

## 2025-01-14 PROCEDURE — 88311 DECALCIFY TISSUE: CPT | Performed by: THORACIC SURGERY (CARDIOTHORACIC VASCULAR SURGERY)

## 2025-01-14 PROCEDURE — 85347 COAGULATION TIME ACTIVATED: CPT

## 2025-01-14 PROCEDURE — 93005 ELECTROCARDIOGRAM TRACING: CPT | Performed by: NURSE PRACTITIONER

## 2025-01-14 PROCEDURE — 25010000002 PROPOFOL 200 MG/20ML EMULSION: Performed by: ANESTHESIOLOGY

## 2025-01-14 PROCEDURE — 82330 ASSAY OF CALCIUM: CPT

## 2025-01-14 PROCEDURE — 94799 UNLISTED PULMONARY SVC/PX: CPT

## 2025-01-14 PROCEDURE — 85018 HEMOGLOBIN: CPT

## 2025-01-14 PROCEDURE — 93318 ECHO TRANSESOPHAGEAL INTRAOP: CPT | Performed by: THORACIC SURGERY (CARDIOTHORACIC VASCULAR SURGERY)

## 2025-01-14 PROCEDURE — 85576 BLOOD PLATELET AGGREGATION: CPT | Performed by: THORACIC SURGERY (CARDIOTHORACIC VASCULAR SURGERY)

## 2025-01-14 PROCEDURE — 94002 VENT MGMT INPAT INIT DAY: CPT

## 2025-01-14 PROCEDURE — 25810000003 SODIUM CHLORIDE 0.9 % SOLUTION 250 ML FLEX CONT: Performed by: NURSE PRACTITIONER

## 2025-01-14 PROCEDURE — 71045 X-RAY EXAM CHEST 1 VIEW: CPT

## 2025-01-14 PROCEDURE — 85014 HEMATOCRIT: CPT

## 2025-01-14 PROCEDURE — C1729 CATH, DRAINAGE: HCPCS | Performed by: THORACIC SURGERY (CARDIOTHORACIC VASCULAR SURGERY)

## 2025-01-14 PROCEDURE — 02RF08Z REPLACEMENT OF AORTIC VALVE WITH ZOOPLASTIC TISSUE, OPEN APPROACH: ICD-10-PCS | Performed by: THORACIC SURGERY (CARDIOTHORACIC VASCULAR SURGERY)

## 2025-01-14 PROCEDURE — C1887 CATHETER, GUIDING: HCPCS | Performed by: THORACIC SURGERY (CARDIOTHORACIC VASCULAR SURGERY)

## 2025-01-14 PROCEDURE — 25010000002 MAGNESIUM SULFATE PER 500 MG OF MAGNESIUM: Performed by: ANESTHESIOLOGY

## 2025-01-14 PROCEDURE — 33405 REPLACEMENT AORTIC VALVE OPN: CPT | Performed by: PHYSICIAN ASSISTANT

## 2025-01-14 PROCEDURE — 25810000003 SODIUM CHLORIDE 0.9 % SOLUTION: Performed by: ANESTHESIOLOGY

## 2025-01-14 PROCEDURE — 88305 TISSUE EXAM BY PATHOLOGIST: CPT | Performed by: THORACIC SURGERY (CARDIOTHORACIC VASCULAR SURGERY)

## 2025-01-14 PROCEDURE — 85025 COMPLETE CBC W/AUTO DIFF WBC: CPT | Performed by: NURSE PRACTITIONER

## 2025-01-14 PROCEDURE — 80051 ELECTROLYTE PANEL: CPT

## 2025-01-14 PROCEDURE — 25010000002 NICARDIPINE 2.5 MG/ML SOLUTION 10 ML VIAL: Performed by: NURSE PRACTITIONER

## 2025-01-14 PROCEDURE — 85384 FIBRINOGEN ACTIVITY: CPT | Performed by: THORACIC SURGERY (CARDIOTHORACIC VASCULAR SURGERY)

## 2025-01-14 PROCEDURE — 85610 PROTHROMBIN TIME: CPT | Performed by: NURSE PRACTITIONER

## 2025-01-14 PROCEDURE — 33268 EXCL LAA OPN OTH PX ANY METH: CPT | Performed by: THORACIC SURGERY (CARDIOTHORACIC VASCULAR SURGERY)

## 2025-01-14 PROCEDURE — 25010000002 MAGNESIUM SULFATE IN D5W 1G/100ML (PREMIX) 1-5 GM/100ML-% SOLUTION: Performed by: NURSE PRACTITIONER

## 2025-01-14 PROCEDURE — 25010000002 POTASSIUM CHLORIDE PER 2 MEQ: Performed by: THORACIC SURGERY (CARDIOTHORACIC VASCULAR SURGERY)

## 2025-01-14 PROCEDURE — 82803 BLOOD GASES ANY COMBINATION: CPT | Performed by: NURSE PRACTITIONER

## 2025-01-14 PROCEDURE — 82948 REAGENT STRIP/BLOOD GLUCOSE: CPT

## 2025-01-14 PROCEDURE — C1751 CATH, INF, PER/CENT/MIDLINE: HCPCS | Performed by: THORACIC SURGERY (CARDIOTHORACIC VASCULAR SURGERY)

## 2025-01-14 PROCEDURE — 84295 ASSAY OF SERUM SODIUM: CPT

## 2025-01-14 PROCEDURE — 02L70CK OCCLUSION OF LEFT ATRIAL APPENDAGE WITH EXTRALUMINAL DEVICE, OPEN APPROACH: ICD-10-PCS | Performed by: THORACIC SURGERY (CARDIOTHORACIC VASCULAR SURGERY)

## 2025-01-14 PROCEDURE — P9041 ALBUMIN (HUMAN),5%, 50ML: HCPCS | Performed by: NURSE PRACTITIONER

## 2025-01-14 PROCEDURE — 82330 ASSAY OF CALCIUM: CPT | Performed by: THORACIC SURGERY (CARDIOTHORACIC VASCULAR SURGERY)

## 2025-01-14 PROCEDURE — 25010000002 HEPARIN (PORCINE) 1000-0.9 UT/500ML-% SOLUTION: Performed by: ANESTHESIOLOGY

## 2025-01-14 PROCEDURE — 25010000002 ACETAMINOPHEN 10 MG/ML SOLUTION: Performed by: ANESTHESIOLOGY

## 2025-01-14 PROCEDURE — 84132 ASSAY OF SERUM POTASSIUM: CPT

## 2025-01-14 PROCEDURE — 93318 ECHO TRANSESOPHAGEAL INTRAOP: CPT | Performed by: ANESTHESIOLOGY

## 2025-01-14 PROCEDURE — 25010000002 MIDAZOLAM PER 1 MG: Performed by: ANESTHESIOLOGY

## 2025-01-14 PROCEDURE — 85027 COMPLETE CBC AUTOMATED: CPT | Performed by: THORACIC SURGERY (CARDIOTHORACIC VASCULAR SURGERY)

## 2025-01-14 PROCEDURE — 5A1221Z PERFORMANCE OF CARDIAC OUTPUT, CONTINUOUS: ICD-10-PCS | Performed by: THORACIC SURGERY (CARDIOTHORACIC VASCULAR SURGERY)

## 2025-01-14 PROCEDURE — 25010000002 ALBUMIN HUMAN 5% PER 50 ML: Performed by: NURSE PRACTITIONER

## 2025-01-14 PROCEDURE — 85610 PROTHROMBIN TIME: CPT | Performed by: THORACIC SURGERY (CARDIOTHORACIC VASCULAR SURGERY)

## 2025-01-14 PROCEDURE — C1751 CATH, INF, PER/CENT/MIDLINE: HCPCS | Performed by: ANESTHESIOLOGY

## 2025-01-14 PROCEDURE — B24BZZ4 ULTRASONOGRAPHY OF HEART WITH AORTA, TRANSESOPHAGEAL: ICD-10-PCS | Performed by: ANESTHESIOLOGY

## 2025-01-14 PROCEDURE — 33268 EXCL LAA OPN OTH PX ANY METH: CPT | Performed by: PHYSICIAN ASSISTANT

## 2025-01-14 PROCEDURE — 85730 THROMBOPLASTIN TIME PARTIAL: CPT | Performed by: NURSE PRACTITIONER

## 2025-01-14 PROCEDURE — 82803 BLOOD GASES ANY COMBINATION: CPT | Performed by: THORACIC SURGERY (CARDIOTHORACIC VASCULAR SURGERY)

## 2025-01-14 PROCEDURE — 80069 RENAL FUNCTION PANEL: CPT | Performed by: NURSE PRACTITIONER

## 2025-01-14 PROCEDURE — 25010000002 HEPARIN (PORCINE) PER 1000 UNITS: Performed by: ANESTHESIOLOGY

## 2025-01-14 PROCEDURE — 25810000003 SODIUM CHLORIDE 0.9 % SOLUTION: Performed by: NURSE PRACTITIONER

## 2025-01-14 PROCEDURE — 33405 REPLACEMENT AORTIC VALVE OPN: CPT | Performed by: THORACIC SURGERY (CARDIOTHORACIC VASCULAR SURGERY)

## 2025-01-14 PROCEDURE — 25010000002 NITROGLYCERIN 200 MCG/ML SOLUTION: Performed by: ANESTHESIOLOGY

## 2025-01-14 PROCEDURE — 25010000002 FENTANYL CITRATE (PF) 250 MCG/5ML SOLUTION: Performed by: ANESTHESIOLOGY

## 2025-01-14 PROCEDURE — 85007 BL SMEAR W/DIFF WBC COUNT: CPT | Performed by: NURSE PRACTITIONER

## 2025-01-14 PROCEDURE — 25010000002 ONDANSETRON PER 1 MG: Performed by: NURSE PRACTITIONER

## 2025-01-14 PROCEDURE — 82565 ASSAY OF CREATININE: CPT

## 2025-01-14 PROCEDURE — 25010000002 CEFAZOLIN PER 500 MG: Performed by: NURSE PRACTITIONER

## 2025-01-14 PROCEDURE — 25010000002 CEFAZOLIN PER 500 MG: Performed by: PHYSICIAN ASSISTANT

## 2025-01-14 PROCEDURE — 99222 1ST HOSP IP/OBS MODERATE 55: CPT | Performed by: INTERNAL MEDICINE

## 2025-01-14 PROCEDURE — 3E080GC INTRODUCTION OF OTHER THERAPEUTIC SUBSTANCE INTO HEART, OPEN APPROACH: ICD-10-PCS | Performed by: THORACIC SURGERY (CARDIOTHORACIC VASCULAR SURGERY)

## 2025-01-14 PROCEDURE — 82947 ASSAY GLUCOSE BLOOD QUANT: CPT

## 2025-01-14 DEVICE — APPL CLIP LAA ATRICLIP FLXV 45MM: Type: IMPLANTABLE DEVICE | Site: HEART | Status: FUNCTIONAL

## 2025-01-14 DEVICE — SS SUTURE, 3 PER SLEEVE
Type: IMPLANTABLE DEVICE | Site: STERNUM | Status: FUNCTIONAL
Brand: MYO/WIRE II

## 2025-01-14 DEVICE — TEMP PACING WIRE
Type: IMPLANTABLE DEVICE | Site: HEART | Status: FUNCTIONAL
Brand: MYO/WIRE

## 2025-01-14 DEVICE — VLV AORT AVALUS BIOPROSTHESIS 23MM: Type: IMPLANTABLE DEVICE | Site: HEART | Status: FUNCTIONAL

## 2025-01-14 DEVICE — ABSORBABLE HEMOSTAT (OXIDIZED REGENERATED CELLULOSE)
Type: IMPLANTABLE DEVICE | Site: HEART | Status: FUNCTIONAL
Brand: SURGICEL

## 2025-01-14 DEVICE — INCISIONLINE PLEDGET TFLN SFT LG: Type: IMPLANTABLE DEVICE | Site: HEART | Status: FUNCTIONAL

## 2025-01-14 DEVICE — SS SUTURE, 6 PER SLEEVE
Type: IMPLANTABLE DEVICE | Site: STERNUM | Status: FUNCTIONAL
Brand: MYO/WIRE II

## 2025-01-14 DEVICE — WAX,BONE,NATURAL
Type: IMPLANTABLE DEVICE | Site: STERNUM | Status: FUNCTIONAL
Brand: MEDLINE INDUSTRIES

## 2025-01-14 DEVICE — DEV CONTRL TISS STRATAFIX SPIRAL MNCRYL UD 3/0 PLS 30CM: Type: IMPLANTABLE DEVICE | Site: CHEST | Status: FUNCTIONAL

## 2025-01-14 RX ORDER — ACETAMINOPHEN 325 MG/1
650 TABLET ORAL EVERY 4 HOURS PRN
Status: DISCONTINUED | OUTPATIENT
Start: 2025-01-15 | End: 2025-01-18 | Stop reason: HOSPADM

## 2025-01-14 RX ORDER — POLYETHYLENE GLYCOL 3350 17 G/17G
17 POWDER, FOR SOLUTION ORAL 2 TIMES DAILY
Status: DISCONTINUED | OUTPATIENT
Start: 2025-01-15 | End: 2025-01-18 | Stop reason: HOSPADM

## 2025-01-14 RX ORDER — ACETAMINOPHEN 10 MG/ML
1000 INJECTION, SOLUTION INTRAVENOUS EVERY 8 HOURS
Status: COMPLETED | OUTPATIENT
Start: 2025-01-14 | End: 2025-01-15

## 2025-01-14 RX ORDER — HYDROCODONE BITARTRATE AND ACETAMINOPHEN 5; 325 MG/1; MG/1
1 TABLET ORAL EVERY 4 HOURS PRN
Status: DISCONTINUED | OUTPATIENT
Start: 2025-01-14 | End: 2025-01-16

## 2025-01-14 RX ORDER — IBUPROFEN 600 MG/1
1 TABLET ORAL
Status: DISCONTINUED | OUTPATIENT
Start: 2025-01-14 | End: 2025-01-16

## 2025-01-14 RX ORDER — CHLORHEXIDINE GLUCONATE ORAL RINSE 1.2 MG/ML
15 SOLUTION DENTAL ONCE
Status: COMPLETED | OUTPATIENT
Start: 2025-01-14 | End: 2025-01-14

## 2025-01-14 RX ORDER — VECURONIUM BROMIDE 1 MG/ML
INJECTION, POWDER, LYOPHILIZED, FOR SOLUTION INTRAVENOUS AS NEEDED
Status: DISCONTINUED | OUTPATIENT
Start: 2025-01-14 | End: 2025-01-14 | Stop reason: SURG

## 2025-01-14 RX ORDER — ENOXAPARIN SODIUM 100 MG/ML
40 INJECTION SUBCUTANEOUS EVERY 24 HOURS
Status: DISCONTINUED | OUTPATIENT
Start: 2025-01-15 | End: 2025-01-18 | Stop reason: HOSPADM

## 2025-01-14 RX ORDER — ASPIRIN 81 MG/1
81 TABLET ORAL DAILY
Status: DISCONTINUED | OUTPATIENT
Start: 2025-01-15 | End: 2025-01-18 | Stop reason: HOSPADM

## 2025-01-14 RX ORDER — PHENYLEPHRINE HYDROCHLORIDE 10 MG/ML
INJECTION INTRAVENOUS AS NEEDED
Status: DISCONTINUED | OUTPATIENT
Start: 2025-01-14 | End: 2025-01-14 | Stop reason: SURG

## 2025-01-14 RX ORDER — MEPERIDINE HYDROCHLORIDE 25 MG/ML
25 INJECTION INTRAMUSCULAR; INTRAVENOUS; SUBCUTANEOUS ONCE AS NEEDED
Status: DISCONTINUED | OUTPATIENT
Start: 2025-01-14 | End: 2025-01-15

## 2025-01-14 RX ORDER — POTASSIUM CHLORIDE 29.8 MG/ML
20 INJECTION INTRAVENOUS ONCE
Status: COMPLETED | OUTPATIENT
Start: 2025-01-14 | End: 2025-01-14

## 2025-01-14 RX ORDER — ACETAMINOPHEN 650 MG
TABLET, EXTENDED RELEASE ORAL AS NEEDED
Status: DISCONTINUED | OUTPATIENT
Start: 2025-01-14 | End: 2025-01-14 | Stop reason: HOSPADM

## 2025-01-14 RX ORDER — BISACODYL 10 MG
10 SUPPOSITORY, RECTAL RECTAL DAILY PRN
Status: DISCONTINUED | OUTPATIENT
Start: 2025-01-15 | End: 2025-01-18 | Stop reason: HOSPADM

## 2025-01-14 RX ORDER — NOREPINEPHRINE BITARTRATE 0.03 MG/ML
.02-.06 INJECTION, SOLUTION INTRAVENOUS CONTINUOUS PRN
Status: DISCONTINUED | OUTPATIENT
Start: 2025-01-14 | End: 2025-01-15

## 2025-01-14 RX ORDER — CYCLOBENZAPRINE HCL 10 MG
5 TABLET ORAL EVERY 8 HOURS PRN
Status: DISCONTINUED | OUTPATIENT
Start: 2025-01-15 | End: 2025-01-18 | Stop reason: HOSPADM

## 2025-01-14 RX ORDER — HEPARIN SODIUM 1000 [USP'U]/ML
INJECTION, SOLUTION INTRAVENOUS; SUBCUTANEOUS AS NEEDED
Status: DISCONTINUED | OUTPATIENT
Start: 2025-01-14 | End: 2025-01-14 | Stop reason: SURG

## 2025-01-14 RX ORDER — AMINOCAPROIC ACID 250 MG/ML
INJECTION, SOLUTION INTRAVENOUS AS NEEDED
Status: DISCONTINUED | OUTPATIENT
Start: 2025-01-14 | End: 2025-01-14 | Stop reason: SURG

## 2025-01-14 RX ORDER — FENTANYL CITRATE 50 UG/ML
INJECTION, SOLUTION INTRAMUSCULAR; INTRAVENOUS AS NEEDED
Status: DISCONTINUED | OUTPATIENT
Start: 2025-01-14 | End: 2025-01-14 | Stop reason: SURG

## 2025-01-14 RX ORDER — NITROGLYCERIN 0.4 MG/1
0.4 TABLET SUBLINGUAL
Status: DISCONTINUED | OUTPATIENT
Start: 2025-01-14 | End: 2025-01-18 | Stop reason: HOSPADM

## 2025-01-14 RX ORDER — POTASSIUM CHLORIDE 29.8 MG/ML
20 INJECTION INTRAVENOUS
Status: COMPLETED | OUTPATIENT
Start: 2025-01-15 | End: 2025-01-15

## 2025-01-14 RX ORDER — ACETAMINOPHEN 160 MG/5ML
650 SOLUTION ORAL EVERY 4 HOURS PRN
Status: DISCONTINUED | OUTPATIENT
Start: 2025-01-15 | End: 2025-01-18 | Stop reason: HOSPADM

## 2025-01-14 RX ORDER — MIDAZOLAM HYDROCHLORIDE 1 MG/ML
INJECTION, SOLUTION INTRAMUSCULAR; INTRAVENOUS AS NEEDED
Status: DISCONTINUED | OUTPATIENT
Start: 2025-01-14 | End: 2025-01-14 | Stop reason: SURG

## 2025-01-14 RX ORDER — DEXTROSE MONOHYDRATE 25 G/50ML
10-50 INJECTION, SOLUTION INTRAVENOUS
Status: DISCONTINUED | OUTPATIENT
Start: 2025-01-14 | End: 2025-01-16

## 2025-01-14 RX ORDER — ACETAMINOPHEN 10 MG/ML
INJECTION, SOLUTION INTRAVENOUS AS NEEDED
Status: DISCONTINUED | OUTPATIENT
Start: 2025-01-14 | End: 2025-01-14 | Stop reason: SURG

## 2025-01-14 RX ORDER — PROPOFOL 10 MG/ML
INJECTION, EMULSION INTRAVENOUS AS NEEDED
Status: DISCONTINUED | OUTPATIENT
Start: 2025-01-14 | End: 2025-01-14 | Stop reason: SURG

## 2025-01-14 RX ORDER — FENTANYL/ROPIVACAINE/NS/PF 2-625MCG/1
15 PLASTIC BAG, INJECTION (ML) EPIDURAL ONCE
Status: COMPLETED | OUTPATIENT
Start: 2025-01-14 | End: 2025-01-14

## 2025-01-14 RX ORDER — AMOXICILLIN 250 MG
2 CAPSULE ORAL 2 TIMES DAILY
Status: DISCONTINUED | OUTPATIENT
Start: 2025-01-14 | End: 2025-01-18 | Stop reason: HOSPADM

## 2025-01-14 RX ORDER — NITROGLYCERIN 20 MG/100ML
INJECTION INTRAVENOUS CONTINUOUS PRN
Status: DISCONTINUED | OUTPATIENT
Start: 2025-01-14 | End: 2025-01-14 | Stop reason: SURG

## 2025-01-14 RX ORDER — MAGNESIUM SULFATE 1 G/100ML
1 INJECTION INTRAVENOUS EVERY 8 HOURS
Status: COMPLETED | OUTPATIENT
Start: 2025-01-14 | End: 2025-01-15

## 2025-01-14 RX ORDER — CHLORHEXIDINE GLUCONATE ORAL RINSE 1.2 MG/ML
15 SOLUTION DENTAL EVERY 12 HOURS
Status: DISCONTINUED | OUTPATIENT
Start: 2025-01-14 | End: 2025-01-18

## 2025-01-14 RX ORDER — CEFAZOLIN SODIUM 1 G/3ML
INJECTION, POWDER, FOR SOLUTION INTRAMUSCULAR; INTRAVENOUS AS NEEDED
Status: DISCONTINUED | OUTPATIENT
Start: 2025-01-14 | End: 2025-01-14 | Stop reason: SURG

## 2025-01-14 RX ORDER — NICOTINE POLACRILEX 4 MG
15 LOZENGE BUCCAL
Status: DISCONTINUED | OUTPATIENT
Start: 2025-01-14 | End: 2025-01-16

## 2025-01-14 RX ORDER — MAGNESIUM SULFATE HEPTAHYDRATE 500 MG/ML
INJECTION, SOLUTION INTRAMUSCULAR; INTRAVENOUS AS NEEDED
Status: DISCONTINUED | OUTPATIENT
Start: 2025-01-14 | End: 2025-01-14 | Stop reason: SURG

## 2025-01-14 RX ORDER — ALBUMIN HUMAN 50 G/1000ML
12.5 SOLUTION INTRAVENOUS AS NEEDED
Status: COMPLETED | OUTPATIENT
Start: 2025-01-14 | End: 2025-01-14

## 2025-01-14 RX ORDER — HEPARIN SODIUM 200 [USP'U]/100ML
INJECTION, SOLUTION INTRAVENOUS
Status: COMPLETED | OUTPATIENT
Start: 2025-01-14 | End: 2025-01-14

## 2025-01-14 RX ORDER — ASPIRIN 325 MG
325 TABLET ORAL ONCE
Status: COMPLETED | OUTPATIENT
Start: 2025-01-14 | End: 2025-01-14

## 2025-01-14 RX ORDER — SODIUM CHLORIDE 9 MG/ML
30 INJECTION, SOLUTION INTRAVENOUS CONTINUOUS
Status: DISCONTINUED | OUTPATIENT
Start: 2025-01-14 | End: 2025-01-16

## 2025-01-14 RX ORDER — ACETAMINOPHEN 650 MG/1
650 SUPPOSITORY RECTAL EVERY 4 HOURS PRN
Status: DISCONTINUED | OUTPATIENT
Start: 2025-01-15 | End: 2025-01-18 | Stop reason: HOSPADM

## 2025-01-14 RX ORDER — MORPHINE SULFATE 2 MG/ML
2 INJECTION, SOLUTION INTRAMUSCULAR; INTRAVENOUS
Status: DISCONTINUED | OUTPATIENT
Start: 2025-01-14 | End: 2025-01-17

## 2025-01-14 RX ORDER — CHLORHEXIDINE GLUCONATE 500 MG/1
1 CLOTH TOPICAL EVERY 12 HOURS PRN
Status: DISCONTINUED | OUTPATIENT
Start: 2025-01-14 | End: 2025-01-14 | Stop reason: HOSPADM

## 2025-01-14 RX ORDER — NICARDIPINE HYDROCHLORIDE 2.5 MG/ML
INJECTION INTRAVENOUS AS NEEDED
Status: DISCONTINUED | OUTPATIENT
Start: 2025-01-14 | End: 2025-01-14 | Stop reason: SURG

## 2025-01-14 RX ORDER — MAGNESIUM HYDROXIDE 1200 MG/15ML
LIQUID ORAL AS NEEDED
Status: DISCONTINUED | OUTPATIENT
Start: 2025-01-14 | End: 2025-01-14 | Stop reason: HOSPADM

## 2025-01-14 RX ORDER — NOREPINEPHRINE BITARTRATE 0.03 MG/ML
INJECTION, SOLUTION INTRAVENOUS CONTINUOUS PRN
Status: DISCONTINUED | OUTPATIENT
Start: 2025-01-14 | End: 2025-01-14 | Stop reason: SURG

## 2025-01-14 RX ORDER — SODIUM CHLORIDE 9 MG/ML
INJECTION, SOLUTION INTRAVENOUS CONTINUOUS PRN
Status: DISCONTINUED | OUTPATIENT
Start: 2025-01-14 | End: 2025-01-14 | Stop reason: SURG

## 2025-01-14 RX ORDER — ONDANSETRON 2 MG/ML
4 INJECTION INTRAMUSCULAR; INTRAVENOUS EVERY 6 HOURS PRN
Status: DISCONTINUED | OUTPATIENT
Start: 2025-01-14 | End: 2025-01-18 | Stop reason: HOSPADM

## 2025-01-14 RX ORDER — ATORVASTATIN CALCIUM 40 MG/1
40 TABLET, FILM COATED ORAL NIGHTLY
Status: DISCONTINUED | OUTPATIENT
Start: 2025-01-15 | End: 2025-01-17

## 2025-01-14 RX ORDER — BISACODYL 5 MG/1
10 TABLET, DELAYED RELEASE ORAL DAILY PRN
Status: DISCONTINUED | OUTPATIENT
Start: 2025-01-14 | End: 2025-01-18 | Stop reason: HOSPADM

## 2025-01-14 RX ORDER — NALOXONE HCL 0.4 MG/ML
0.4 VIAL (ML) INJECTION
Status: DISCONTINUED | OUTPATIENT
Start: 2025-01-14 | End: 2025-01-17

## 2025-01-14 RX ORDER — DEXMEDETOMIDINE HYDROCHLORIDE 4 UG/ML
.2-1.5 INJECTION, SOLUTION INTRAVENOUS
Status: DISCONTINUED | OUTPATIENT
Start: 2025-01-14 | End: 2025-01-15

## 2025-01-14 RX ADMIN — FENTANYL CITRATE 250 MCG: 50 INJECTION, SOLUTION INTRAMUSCULAR; INTRAVENOUS at 13:15

## 2025-01-14 RX ADMIN — HEPARIN SODIUM 21000 UNITS: 1000 INJECTION INTRAVENOUS; SUBCUTANEOUS at 13:25

## 2025-01-14 RX ADMIN — DEXMEDETOMIDINE HYDROCHLORIDE 0.4 MCG/KG/HR: 100 INJECTION, SOLUTION INTRAVENOUS at 13:12

## 2025-01-14 RX ADMIN — ALBUMIN (HUMAN) 12.5 G: 2.5 SOLUTION INTRAVENOUS at 17:42

## 2025-01-14 RX ADMIN — AMINOCAPROIC ACID 10 G: 250 INJECTION, SOLUTION INTRAVENOUS at 12:51

## 2025-01-14 RX ADMIN — AMINOCAPROIC ACID 10 G: 250 INJECTION, SOLUTION INTRAVENOUS at 14:36

## 2025-01-14 RX ADMIN — VECURONIUM BROMIDE 10 MG: 10 INJECTION, POWDER, LYOPHILIZED, FOR SOLUTION INTRAVENOUS at 12:29

## 2025-01-14 RX ADMIN — CEFAZOLIN 2000 MG: 2 INJECTION, POWDER, FOR SOLUTION INTRAMUSCULAR; INTRAVENOUS at 12:35

## 2025-01-14 RX ADMIN — FENTANYL CITRATE 150 MCG: 50 INJECTION, SOLUTION INTRAMUSCULAR; INTRAVENOUS at 12:29

## 2025-01-14 RX ADMIN — MIDAZOLAM 2 MG: 1 INJECTION INTRAMUSCULAR; INTRAVENOUS at 12:21

## 2025-01-14 RX ADMIN — SENNOSIDES AND DOCUSATE SODIUM 2 TABLET: 50; 8.6 TABLET ORAL at 22:03

## 2025-01-14 RX ADMIN — PHENYLEPHRINE HYDROCHLORIDE 100 MCG: 10 INJECTION INTRAVENOUS at 12:46

## 2025-01-14 RX ADMIN — NICARDIPINE HYDROCHLORIDE 0.5 MG: 25 INJECTION INTRAVENOUS at 13:21

## 2025-01-14 RX ADMIN — ALBUMIN (HUMAN) 12.5 G: 2.5 SOLUTION INTRAVENOUS at 16:47

## 2025-01-14 RX ADMIN — MORPHINE SULFATE 2 MG: 2 INJECTION, SOLUTION INTRAMUSCULAR; INTRAVENOUS at 20:22

## 2025-01-14 RX ADMIN — ACETAMINOPHEN 1000 MG: 1000 INJECTION INTRAVENOUS at 22:02

## 2025-01-14 RX ADMIN — MORPHINE SULFATE 2 MG: 2 INJECTION, SOLUTION INTRAMUSCULAR; INTRAVENOUS at 22:26

## 2025-01-14 RX ADMIN — ACETAMINOPHEN 1000 MG: 10 INJECTION, SOLUTION INTRAVENOUS at 14:38

## 2025-01-14 RX ADMIN — MUPIROCIN 1 APPLICATION: 20 OINTMENT TOPICAL at 22:03

## 2025-01-14 RX ADMIN — VECURONIUM BROMIDE 2 MG: 10 INJECTION, POWDER, LYOPHILIZED, FOR SOLUTION INTRAVENOUS at 13:12

## 2025-01-14 RX ADMIN — Medication 0.06 MCG/KG/MIN: at 13:37

## 2025-01-14 RX ADMIN — VECURONIUM BROMIDE 2 MG: 10 INJECTION, POWDER, LYOPHILIZED, FOR SOLUTION INTRAVENOUS at 14:46

## 2025-01-14 RX ADMIN — Medication 12.5 MG: at 10:35

## 2025-01-14 RX ADMIN — CEFAZOLIN 2 G: 1 INJECTION, POWDER, FOR SOLUTION INTRAMUSCULAR; INTRAVENOUS at 14:34

## 2025-01-14 RX ADMIN — INSULIN HUMAN 1 UNITS/HR: 1 INJECTION, SOLUTION INTRAVENOUS at 13:39

## 2025-01-14 RX ADMIN — NICARDIPINE HYDROCHLORIDE 1 MG: 25 INJECTION INTRAVENOUS at 14:46

## 2025-01-14 RX ADMIN — CHLORHEXIDINE GLUCONATE 15 ML: 1.2 RINSE ORAL at 10:34

## 2025-01-14 RX ADMIN — PROTAMINE SULFATE 250 MG: 10 INJECTION, SOLUTION INTRAVENOUS at 14:27

## 2025-01-14 RX ADMIN — CEFAZOLIN 2 G: 2 INJECTION, POWDER, FOR SOLUTION INTRAMUSCULAR; INTRAVENOUS at 22:02

## 2025-01-14 RX ADMIN — MIDAZOLAM 1 MG: 1 INJECTION INTRAMUSCULAR; INTRAVENOUS at 12:25

## 2025-01-14 RX ADMIN — MAGNESIUM SULFATE HEPTAHYDRATE 1 G: 1 INJECTION, SOLUTION INTRAVENOUS at 22:02

## 2025-01-14 RX ADMIN — ALBUMIN (HUMAN) 12.5 G: 2.5 SOLUTION INTRAVENOUS at 18:59

## 2025-01-14 RX ADMIN — POTASSIUM CHLORIDE 20 MEQ: 29.8 INJECTION, SOLUTION INTRAVENOUS at 17:42

## 2025-01-14 RX ADMIN — SODIUM CHLORIDE 30 ML/HR: 9 INJECTION, SOLUTION INTRAVENOUS at 15:46

## 2025-01-14 RX ADMIN — PROPOFOL 80 MG: 10 INJECTION, EMULSION INTRAVENOUS at 12:29

## 2025-01-14 RX ADMIN — PHENYLEPHRINE HYDROCHLORIDE 100 MCG: 10 INJECTION INTRAVENOUS at 12:31

## 2025-01-14 RX ADMIN — VECURONIUM BROMIDE 2 MG: 10 INJECTION, POWDER, LYOPHILIZED, FOR SOLUTION INTRAVENOUS at 13:43

## 2025-01-14 RX ADMIN — FENTANYL CITRATE 250 MCG: 50 INJECTION, SOLUTION INTRAMUSCULAR; INTRAVENOUS at 13:05

## 2025-01-14 RX ADMIN — MIDAZOLAM 2 MG: 1 INJECTION INTRAMUSCULAR; INTRAVENOUS at 14:06

## 2025-01-14 RX ADMIN — ASPIRIN 325 MG ORAL TABLET 325 MG: 325 PILL ORAL at 22:03

## 2025-01-14 RX ADMIN — NITROGLYCERIN 20 MCG/MIN: 20 INJECTION INTRAVENOUS at 13:14

## 2025-01-14 RX ADMIN — FENTANYL CITRATE 250 MCG: 50 INJECTION, SOLUTION INTRAMUSCULAR; INTRAVENOUS at 14:46

## 2025-01-14 RX ADMIN — MUPIROCIN 1 APPLICATION: 20 OINTMENT TOPICAL at 10:34

## 2025-01-14 RX ADMIN — CHLORHEXIDINE GLUCONATE, 0.12% ORAL RINSE 15 ML: 1.2 SOLUTION DENTAL at 22:01

## 2025-01-14 RX ADMIN — ONDANSETRON 4 MG: 2 INJECTION INTRAMUSCULAR; INTRAVENOUS at 22:26

## 2025-01-14 RX ADMIN — HEPARIN SODIUM 1000 UNITS: 200 INJECTION, SOLUTION INTRAVENOUS at 12:29

## 2025-01-14 RX ADMIN — SODIUM CHLORIDE: 9 INJECTION, SOLUTION INTRAVENOUS at 12:19

## 2025-01-14 RX ADMIN — ALBUMIN (HUMAN) 12.5 G: 2.5 SOLUTION INTRAVENOUS at 15:58

## 2025-01-14 RX ADMIN — VECURONIUM BROMIDE 2 MG: 10 INJECTION, POWDER, LYOPHILIZED, FOR SOLUTION INTRAVENOUS at 14:08

## 2025-01-14 RX ADMIN — POTASSIUM PHOSPHATE, MONOBASIC AND POTASSIUM PHOSPHATE, DIBASIC 15 MMOL: 224; 236 INJECTION, SOLUTION, CONCENTRATE INTRAVENOUS at 20:26

## 2025-01-14 RX ADMIN — SODIUM CHLORIDE 5 MG/HR: 9 INJECTION, SOLUTION INTRAVENOUS at 16:30

## 2025-01-14 RX ADMIN — MAGNESIUM SULFATE HEPTAHYDRATE 2 G: 500 INJECTION, SOLUTION INTRAMUSCULAR; INTRAVENOUS at 14:20

## 2025-01-14 RX ADMIN — FENTANYL CITRATE 100 MCG: 50 INJECTION, SOLUTION INTRAMUSCULAR; INTRAVENOUS at 12:21

## 2025-01-14 NOTE — ANESTHESIA PROCEDURE NOTES
Central Line      Patient reassessed immediately prior to procedure    Patient location during procedure: OR  Indications: central pressure monitoring, vascular access and MD/Surgeon request  Staff  Anesthesiologist: Yogesh Doan MD  Preanesthetic Checklist  Completed: patient identified, IV checked, site marked, risks and benefits discussed, surgical consent, monitors and equipment checked, pre-op evaluation and timeout performed  Central Line Prep  Sterile Tech:gloves, cap, gown, mask and sterile barriers  Prep: chloraprep  Patient monitoring: blood pressure monitoring, continuous pulse oximetry and EKG  Central Line Procedure  Laterality:right  Location:internal jugular  Catheter Type:double lumen  Catheter Size:9 Fr  Guidance:ultrasound guided  PROCEDURE NOTE/ULTRASOUND INTERPRETATION.  Using ultrasound guidance the potential vascular sites for insertion of the catheter were visualized to determine the patency of the vessel to be used for vascular access.  After selecting the appropriate site for insertion, the needle was visualized under ultrasound being inserted into the internal jugular vein, followed by ultrasound confirmation of wire and catheter placement. There were no abnormalities seen on ultrasound; an image was taken; and the patient tolerated the procedure with no complications. Images: still images not obtained  Assessment  Post procedure:biopatch applied, line sutured and occlusive dressing applied  Assessement:blood return through all ports, free fluid flow, Hong Test and chest x-ray ordered  Complications:no  Patient Tolerance:patient tolerated the procedure well with no apparent complications

## 2025-01-14 NOTE — ANESTHESIA PROCEDURE NOTES
Intra-Op Anesthesia BENJAMIN    Procedure Performed: Intra-Op Anesthesia BENJAMIN       Start Time:        End Time:      Preanesthesia Checklist:  Patient identified, IV assessed, risks and benefits discussed, monitors and equipment assessed, procedure being performed at surgeon's request and anesthesia consent obtained.    General Procedure Information  BENJAMIN Placed for monitoring purposes only -- This is not a diagnostic BENJAMIN  Diagnostic Indications for Echo:  assessment of surgical repair and hemodynamic monitoring  Physician Requesting Echo: Zac Mcbride MD  Location performed:  OR  Intubated  Bite block placed  Heart visualized  Probe Insertion:  Easy  Probe Type:  Multiplane  Modalities:  Color flow mapping and continuous wave Doppler    Echocardiographic and Doppler Measurements    Ventricles    Right Ventricle:  Cavity size normal.  Thrombus not present.  Global function mildly impaired.    Left Ventricle:  Cavity size normal.  Thrombus not present.  Global Function mildly impaired.  Ejection Fraction 50%.          Valves    Aortic Valve:  Annulus normal.  Stenosis not present.  Area: 1.1 cm².  Mean Gradient: 42 mmHg.  Regurgitation absent.  Leaflets normal.  Leaflet motions normal.      Mitral Valve:  Annulus normal.  Stenosis not present.  Regurgitation trace.  Leaflets normal.  Leaflet motions normal.      Tricuspid Valve:  Annulus normal.  Stenosis not present.  Regurgitation absent.  Leaflets normal.  Leaflet motions normal.    Pulmonic Valve:  Annulus normal.  Stenosis not present.  Regurgitation absent.        Aorta    Ascending Aorta:  Size normal.  Dissection not present.    Aortic Arch:  Size normal.  Dissection not present.    Descending Aorta:  Size normal.  Dissection not present.        Atria    Right Atrium:  Size normal.  Spontaneous echo contrast not present.  Thrombus not present.  Tumor not present.  Device not present.      Left Atrium:  Size normal.  Spontaneous echo contrast not present.   Thrombus not present.  Tumor not present.  Device not present.    Left atrial appendage normal.      Septa        Ventricular Septum:  Intra-ventricular septum morphology normal.          Other Findings  Pericardium:  normal  Pleural Effusion:  none      Anesthesia Information  Performed Personally  Anesthesiologist:  Yogesh Doan MD      Echocardiogram Comments:       Post bypass EF 50%. No perivalvular leak noted. Mean gradient 9.42. Trace MR. No AI.

## 2025-01-14 NOTE — CASE MANAGEMENT/SOCIAL WORK
Discharge Planning Assessment  [facility]     Patient Name: Rachael Rodriguez  MRN: [id number]  Today's Date: 1/14/2025    Admit Date: 1/14/2025    Plan: DC Plan: Pending clinical course and PT/OT evals. From home with family. AVR 1/14/2025   Discharge Needs Assessment       Row Name 01/14/25 1212       Living Environment    People in Home spouse    Name(s) of People in Home Pa Rodriguez    Current Living Arrangements home    Potentially Unsafe Housing Conditions none    In the past 12 months has the electric, gas, oil, or water company threatened to shut off services in your home? No    Primary Care Provided by self    Provides Primary Care For parent(s)    Caregiving Concerns Cares for her Father in Law at home.    Family Caregiver if Needed spouse    Family Caregiver Names Pa Rodriguez    Quality of Family Relationships helpful;involved;supportive    Able to Return to Prior Arrangements yes       Resource/Environmental Concerns    Resource/Environmental Concerns none    Transportation Concerns none       Transportation Needs    In the past 12 months, has lack of transportation kept you from medical appointments or from getting medications? no    In the past 12 months, has lack of transportation kept you from meetings, work, or from getting things needed for daily living? No       Food Insecurity    Within the past 12 months, you worried that your food would run out before you got the money to buy more. Never true    Within the past 12 months, the food you bought just didn't last and you didn't have money to get more. Never true       Transition Planning    Patient/Family Anticipates Transition to home with family    Patient/Family Anticipated Services at Transition none    Transportation Anticipated car, drives self;family or friend will provide       Discharge Needs Assessment    Readmission Within the Last 30 Days no previous admission in last 30 days    Equipment Currently Used at Home none    Concerns to be  Addressed discharge planning    Anticipated Changes Related to Illness none    Equipment Needed After Discharge none    Provided Post Acute Provider List? N/A    Provided Post Acute Provider Quality & Resource List? N/A                   Discharge Plan       Row Name 01/14/25 1045       Plan    Plan DC Plan: Pending clinical course and PT/OT evals. From home with family. AVR 1/14/2025    Patient/Family in Agreement with Plan yes    Provided Post Acute Provider List? N/A    Provided Post Acute Provider Quality & Resource List? N/A    Plan Comments CM spoke with patient at bedside to discuss admission assessment and discharge planning. Patient confirms PCP and pharmacy. Patient confirms he is agreeable to meds to bed program at this time. CM updated pharmacy in alaTest. Patient denies any difficulty affording medications or food at this time. Patient denies any additional needs for services or DME at this time. Patient reports independent with ADL's and drives. Patient spouse will provide transportation when ready for DC. Plan for [facility] today.CM will continue to follow for any additional needs and adjust DC plan accordingly. DC Barriers: POD 0 AVR.               Expected Discharge Date and Time       Expected Discharge Date Expected Discharge Time    Jan 19, 2025            Demographic Summary       Row Name 01/14/25 1212       General Information    Admission Type inpatient    Arrived From home    Required Notices Provided Important Message from Medicare    Referral Source admission list    Reason for Consult discharge planning    Preferred Language English       Contact Information    Permission Granted to Share Info With                    Functional Status       Row Name 01/14/25 1212       Functional Status    Usual Activity Tolerance excellent    Current Activity Tolerance good       Physical Activity    On average, how many days per week do you engage in moderate to strenuous exercise (like a brisk  walk)? 0 days    On average, how many minutes do you engage in exercise at this level? 0 min    Number of minutes of exercise per week 0       Functional Status, IADL    Medications independent    Meal Preparation independent    Housekeeping independent    Laundry independent    Shopping independent    If for any reason you need help with day-to-day activities such as bathing, preparing meals, shopping, managing finances, etc., do you get the help you need? I don't need any help       Mental Status    General Appearance WDL WDL       Mental Status Summary    Recent Changes in Mental Status/Cognitive Functioning no changes       Employment/    Employment Status retired    Current or Previous Occupation not applicable                     Nataly Gauthier, RN     Office Phone: [phone]  Office Cell:     [phone]

## 2025-01-14 NOTE — ANESTHESIA PROCEDURE NOTES
Central Line      Patient reassessed immediately prior to procedure    Patient location during procedure: OR  Indications: central pressure monitoring, vascular access and MD/Surgeon request  Staff  Anesthesiologist: Yogesh Doan MD  Preanesthetic Checklist  Completed: patient identified, IV checked, site marked, risks and benefits discussed, surgical consent, monitors and equipment checked, pre-op evaluation and timeout performed  Central Line Prep  Sterile Tech:gloves, cap, gown, mask and sterile barriers  Prep: chloraprep  Patient monitoring: blood pressure monitoring, continuous pulse oximetry and EKG  Central Line Procedure  Laterality:right  Location:internal jugular  Catheter Type:Molino-Oscar  Assessment  Post procedure:biopatch applied, line sutured and occlusive dressing applied  Assessement:blood return through all ports, free fluid flow, chest x-ray ordered and Hong Test  Complications:no  Patient Tolerance:patient tolerated the procedure well with no apparent complications  Additional Notes  Placed through double lumen

## 2025-01-14 NOTE — ANESTHESIA PREPROCEDURE EVALUATION
Anesthesia Evaluation     Patient summary reviewed and Nursing notes reviewed   no history of anesthetic complications:   NPO Solid Status: > 8 hours  NPO Liquid Status: > 8 hours           Airway   Mallampati: II  TM distance: >3 FB  Neck ROM: full  No difficulty expected  Dental - normal exam     Pulmonary - negative pulmonary ROS and normal exam   (-) not a smoker  Cardiovascular - normal exam    ECG reviewed    (+) hypertension, valvular problems/murmurs AS and MR, CHF   (-) angina, LARSON      Neuro/Psych- negative ROS  GI/Hepatic/Renal/Endo - negative ROS     Musculoskeletal (-) negative ROS    Abdominal  - normal exam    Bowel sounds: normal.   Substance History - negative use     OB/GYN negative ob/gyn ROS         Other        ROS/Med Hx Other: Narrative  ·  Left ventricular ejection fraction appears to be 46 - 50%.  ·  Aortic valve is heavily  calcified. There is severe aortic valve  stenosis present.  ·  Mitral valve is grossly normal, there is mild to moderate  regurgitation.  ·  Saline test results are negative.                    Anesthesia Plan    ASA 4     general, Ford, CVL and PAC     intravenous induction   Postoperative Plan: Expected vent after surgery  Anesthetic plan, risks, benefits, and alternatives have been provided, discussed and informed consent has been obtained with: patient.      CODE STATUS:

## 2025-01-14 NOTE — ANESTHESIA PROCEDURE NOTES
Airway  Urgency: elective    Date/Time: 1/14/2025 12:33 PM  Airway not difficult    General Information and Staff    Patient location during procedure: OR  Anesthesiologist: Yogesh Doan MD    Indications and Patient Condition  Indications for airway management: airway protection    Preoxygenated: yes  MILS maintained throughout  Mask difficulty assessment: 1 - vent by mask    Final Airway Details  Final airway type: endotracheal airway      Successful airway: Microcuff Subglottic Suctioning ETT and ETT  Cuffed: yes   Successful intubation technique: direct laryngoscopy  Facilitating devices/methods: cricoid pressure  Endotracheal tube insertion site: oral  Blade: Lizandro  Blade size: 3  ETT size (mm): 7.0  Cormack-Lehane Classification: grade IIa - partial view of glottis  Placement verified by: chest auscultation and capnometry   Measured from: lips  ETT/EBT  to lips (cm): 20  Number of attempts at approach: 1  Assessment: lips, teeth, and gum same as pre-op and atraumatic intubation    Additional Comments  Minimal occlusion pressure in cuff

## 2025-01-14 NOTE — OP NOTE
Operative Note    Date of Dictation: 01/14/25    Date of Procedure: Same    Referring Physician:Americo Gamez MD    Preoperative diagnosis:   1.  Severe aortic stenosis  2.  Degenerative calcific aortic stenosis  3.  Mild to moderate mitral regurgitation  4.  Congestive heart failure, chronic, New York Heart Association class III, diastolic  5.  Dyspnea of exertion    Postoperative diagnosis:   Same    Procedure:   1.  Elective AVR with a 27 mm Avalus ultra Medtronic pericardial prosthesis  2.  Left atrial appendage epicardial closure with a 45 mm atrial clip device    Surgeon: Zac Mcbride MD     Assistants: Assistant: Bakari Solomon PA-C was responsible for performing the following activities: Retraction, Suction, Irrigation, Suturing, Closing, Placing Dressing, and all aspects of the complex cardiac case  and their skilled assistance was necessary for the success of this case.    Anesthesia: General endotracheal anesthesia and BENJAMIN    Findings:  Severe aortic calcification in a bicuspid aortic valve    Estimated Blood Loss: Approximately 400 cc, most of it recovered with a Cell Saver device and cardiotomy suckers and was retransfuse to the patient    STS Data:    The patient was explained the risks (STS risk score calculated), benefits and alternatives of surgery and agreed to proceed. The antibiotics and b blockers were given in the STS required window.  Counseling was given about diet, alcohol and tobacco use as needed    Description of the procedure:     The patient was placed supine on the operative table. Anesthesia was given and lines placed. The patient was prepped and draped using the usual sterile technique. A median sternotomy was performed with a scalpel and the layers carried down to the sternum using the electrocautery. The sternum was splited in the midline using a vertical oscillating saw. Hemostasis was achieved. 300 units of IV heparin was given to maintain the ACT over 400.  Cannulation  sutures were placed in the ascending aorta and right atrium. Small cannulas were placed and aorto right atrial cardiopulmonary bypass was started. Cardioplegia cannulas were placed and then the ascending aorta was clamped. One liter of cold blood cardioplegia was given in an antegrade fashion to achieved diastolic arrest and further doses every 15 minutes thereafter also using retrograde infusion.  The heart was retracted to the right and the base of the appendage was exposed.  I selected a 45 mm atrial clip device which was deployed in the base of the left atrial appendage with good closure  A transverse proximal aortotomy was performed and the valve was exposed. The valve was bicuspid with a conjoint right and left leaflets. The leaflets were resected and annulus debrided and washed out. The annulus was sized to a 27 mm Medtronic Avalus pericardial valve that was washed as recommended by the . 2-0 continue suture was used to anastomose each third of the valve with the corresponding part of the annulus.  The valve was seated, the sutures were tensed and the knots secured each post.  The aortotomy was closed with a 4.0 prolene continuous suture using the double layer McGoon technique.  Patient was systemically rewarmed, I get the 1 dose of cardioplegia and then the root was de-aired and the aortic clamp removed tip suction on the aortic vent.  The left pleural space was suctioned and the lungs ventilated. The heart was paced till regular atrial rhythm resumed. I allowed the heart to eject  and I de-aired the left heart chambers under BENJAMIN guidance and once hemodynamics were acceptable, then the CPB was discontinued and the venous and cardioplegia cannulas removed. The matching dose of protamine was given and the aortic cannula removed as well. AV temporary wires and pleural and mediastinal chest tubes were placed and the wound sprayed with platelet rich plasma.  The perioperative BENJAMIN showed the valve well  seated without significant perivalvular leaks. The sternum was closed with single and double wires and soft tissue in layers of reabsorbable material. The wounds were covered with sterile dressings.       Specimen removed: Aortic valve leaflet    CPB time: 53 minutes    Aortic clamp time: 44 minutes    Complications:  none           Disposition: Cardiovascular recovery room           Condition: Critical but stable.

## 2025-01-14 NOTE — SIGNIFICANT NOTE
01/14/25 1554   OTHER   Discipline physical therapist   Rehab Time/Intention   Session Not Performed other (see comments)  (POD0 AVR. PT and OT will f/u tomorrow for evalutaion as appropriate.)   Therapy Assessment/Plan (PT)   Criteria for Skilled Interventions Met (PT) yes;meets criteria   Recommendation   PT - Next Appointment 01/15/25   Recommendation   OT - Next Appointment 01/15/25        "Patient is calm with a blunted affect reporting moderate depressed mood, no anxiety, no SI/HI/VH. Reports ongoing AH of voices telling him they are going to kill him and his family but says the voices are not currently bothersome. Says they \"get worse when I talk about them.\" Withdrawn to room after breakfast saying he feels tired today. Nicotine gum given for cravings, no unmet needs currently.   "

## 2025-01-14 NOTE — ANESTHESIA PROCEDURE NOTES
Arterial Line      Patient reassessed immediately prior to procedure    Patient location during procedure: OR   Line placed for hemodynamic monitoring, ABGs/Labs/ISTAT and MD/Surgeon request.  Performed By   Anesthesiologist: Yogesh Doan MD   Preanesthetic Checklist  Completed: patient identified, IV checked, site marked, risks and benefits discussed, surgical consent, monitors and equipment checked, pre-op evaluation and timeout performed  Arterial Line Prep    Sterile Tech: cap, gloves and sterile barriers  Prep: ChloraPrep  Patient monitoring: EKG, continuous pulse oximetry and blood pressure monitoring  Arterial Line Procedure   Laterality:left  Location:  radial artery  Catheter size: 20 G   Guidance: palpation technique  Number of attempts: 1  Successful placement: yes  Heparin (Porcine) in NaCl 1000-0.9 UT/500ML-% for arterial line pressure bag - Intra-arterial   1,000 Units - 1/14/2025 12:29:00 PM   Post Assessment   Dressing Type: wrist guard applied, secured with tape and occlusive dressing applied.   Complications no  Circ/Move/Sens Assessment: normal and unchanged.   Patient Tolerance: patient tolerated the procedure well with no apparent complications

## 2025-01-15 ENCOUNTER — APPOINTMENT (OUTPATIENT)
Dept: GENERAL RADIOLOGY | Facility: HOSPITAL | Age: 70
DRG: 220 | End: 2025-01-15
Payer: MEDICARE

## 2025-01-15 LAB
ALBUMIN SERPL-MCNC: 4.8 G/DL (ref 3.5–5.2)
ANION GAP SERPL CALCULATED.3IONS-SCNC: 9.6 MMOL/L (ref 5–15)
ARTERIAL PATENCY WRIST A: ABNORMAL
BASE DEFICIT: -1.1 MEQ/LITER
BASE EXCESS BLDA CALC-SCNC: -1.3 MMOL/L (ref 0–3)
BASOPHILS # BLD AUTO: 0.01 10*3/MM3 (ref 0–0.2)
BASOPHILS NFR BLD AUTO: 0.1 % (ref 0–1.5)
BDY SITE: ABNORMAL
BUN SERPL-MCNC: 15 MG/DL (ref 8–23)
BUN/CREAT SERPL: 20.8 (ref 7–25)
CA-I SERPL ISE-MCNC: 1.29 MMOL/L (ref 1.15–1.3)
CALCIUM SPEC-SCNC: 9.8 MG/DL (ref 8.6–10.5)
CHLORIDE SERPL-SCNC: 119 MMOL/L (ref 98–107)
CO2 BLDA-SCNC: 25.9 MMOL/L (ref 22–29)
CO2 SERPL-SCNC: 22.4 MMOL/L (ref 22–29)
CREAT SERPL-MCNC: 0.72 MG/DL (ref 0.57–1)
DEPRECATED RDW RBC AUTO: 43.5 FL (ref 37–54)
EGFRCR SERPLBLD CKD-EPI 2021: 91.2 ML/MIN/1.73
EOSINOPHIL # BLD AUTO: 0 10*3/MM3 (ref 0–0.4)
EOSINOPHIL NFR BLD AUTO: 0 % (ref 0.3–6.2)
ERYTHROCYTE [DISTWIDTH] IN BLOOD BY AUTOMATED COUNT: 14 % (ref 12.3–15.4)
GLUCOSE BLDC GLUCOMTR-MCNC: 118 MG/DL (ref 70–105)
GLUCOSE BLDC GLUCOMTR-MCNC: 121 MG/DL (ref 70–105)
GLUCOSE BLDC GLUCOMTR-MCNC: 121 MG/DL (ref 70–105)
GLUCOSE BLDC GLUCOMTR-MCNC: 123 MG/DL (ref 70–105)
GLUCOSE BLDC GLUCOMTR-MCNC: 123 MG/DL (ref 70–105)
GLUCOSE BLDC GLUCOMTR-MCNC: 129 MG/DL (ref 74–100)
GLUCOSE BLDC GLUCOMTR-MCNC: 132 MG/DL (ref 70–105)
GLUCOSE BLDC GLUCOMTR-MCNC: 132 MG/DL (ref 70–105)
GLUCOSE BLDC GLUCOMTR-MCNC: 137 MG/DL (ref 70–105)
GLUCOSE BLDC GLUCOMTR-MCNC: 139 MG/DL (ref 70–105)
GLUCOSE BLDC GLUCOMTR-MCNC: 142 MG/DL (ref 70–105)
GLUCOSE BLDC GLUCOMTR-MCNC: 142 MG/DL (ref 70–105)
GLUCOSE BLDC GLUCOMTR-MCNC: 150 MG/DL (ref 70–105)
GLUCOSE BLDC GLUCOMTR-MCNC: 169 MG/DL (ref 70–105)
GLUCOSE BLDC GLUCOMTR-MCNC: 92 MG/DL (ref 70–105)
GLUCOSE SERPL-MCNC: 132 MG/DL (ref 65–99)
HCO3 MIXED: 24.6 MMOL/L (ref 21–29)
HCT VFR BLD AUTO: 33.1 % (ref 34–46.6)
HEMODILUTION: NO
HGB BLD-MCNC: 10.8 G/DL (ref 12–15.9)
IMM GRANULOCYTES # BLD AUTO: 0.07 10*3/MM3 (ref 0–0.05)
IMM GRANULOCYTES NFR BLD AUTO: 0.5 % (ref 0–0.5)
INHALED O2 CONCENTRATION: 36 %
INR PPP: 1.38 (ref 0.9–1.1)
LYMPHOCYTES # BLD AUTO: 0.71 10*3/MM3 (ref 0.7–3.1)
LYMPHOCYTES NFR BLD AUTO: 5 % (ref 19.6–45.3)
Lab: ABNORMAL
MAGNESIUM SERPL-MCNC: 2.8 MG/DL (ref 1.6–2.4)
MCH RBC QN AUTO: 27.5 PG (ref 26.6–33)
MCHC RBC AUTO-ENTMCNC: 32.6 G/DL (ref 31.5–35.7)
MCV RBC AUTO: 84.2 FL (ref 79–97)
MODALITY: ABNORMAL
MONOCYTES # BLD AUTO: 0.81 10*3/MM3 (ref 0.1–0.9)
MONOCYTES NFR BLD AUTO: 5.7 % (ref 5–12)
NEUTROPHILS NFR BLD AUTO: 12.5 10*3/MM3 (ref 1.7–7)
NEUTROPHILS NFR BLD AUTO: 88.7 % (ref 42.7–76)
NOTIFIED WHO: ABNORMAL
NRBC BLD AUTO-RTO: 0 /100 WBC (ref 0–0.2)
O2 SATURATION MIXED: 72.8 %
PCO2 MIXED: 45 MMHG (ref 35–51)
PH MIXED: 7.35 PH UNITS (ref 7.32–7.45)
PHOSPHATE SERPL-MCNC: 4.2 MG/DL (ref 2.5–4.5)
PLATELET # BLD AUTO: 108 10*3/MM3 (ref 140–450)
PMV BLD AUTO: 10.8 FL (ref 6–12)
PO2 MIXED: 40.8 MMHG
POTASSIUM SERPL-SCNC: 4.3 MMOL/L (ref 3.5–5.2)
POTASSIUM SERPL-SCNC: 4.9 MMOL/L (ref 3.5–5.2)
PROTHROMBIN TIME: 17.1 SECONDS (ref 11.7–14.2)
RBC # BLD AUTO: 3.93 10*6/MM3 (ref 3.77–5.28)
READ BACK: ABNORMAL
SODIUM SERPL-SCNC: 151 MMOL/L (ref 136–145)
WBC NRBC COR # BLD AUTO: 14.1 10*3/MM3 (ref 3.4–10.8)

## 2025-01-15 PROCEDURE — 80069 RENAL FUNCTION PANEL: CPT | Performed by: NURSE PRACTITIONER

## 2025-01-15 PROCEDURE — 25010000002 ENOXAPARIN PER 10 MG: Performed by: NURSE PRACTITIONER

## 2025-01-15 PROCEDURE — 83735 ASSAY OF MAGNESIUM: CPT | Performed by: NURSE PRACTITIONER

## 2025-01-15 PROCEDURE — 82330 ASSAY OF CALCIUM: CPT | Performed by: NURSE PRACTITIONER

## 2025-01-15 PROCEDURE — 99024 POSTOP FOLLOW-UP VISIT: CPT | Performed by: THORACIC SURGERY (CARDIOTHORACIC VASCULAR SURGERY)

## 2025-01-15 PROCEDURE — 93005 ELECTROCARDIOGRAM TRACING: CPT | Performed by: NURSE PRACTITIONER

## 2025-01-15 PROCEDURE — 97166 OT EVAL MOD COMPLEX 45 MIN: CPT

## 2025-01-15 PROCEDURE — 97163 PT EVAL HIGH COMPLEX 45 MIN: CPT

## 2025-01-15 PROCEDURE — 25010000002 ACETAMINOPHEN 10 MG/ML SOLUTION: Performed by: NURSE PRACTITIONER

## 2025-01-15 PROCEDURE — 25010000002 CEFAZOLIN PER 500 MG: Performed by: NURSE PRACTITIONER

## 2025-01-15 PROCEDURE — 25010000002 POTASSIUM CHLORIDE PER 2 MEQ: Performed by: THORACIC SURGERY (CARDIOTHORACIC VASCULAR SURGERY)

## 2025-01-15 PROCEDURE — 93010 ELECTROCARDIOGRAM REPORT: CPT | Performed by: INTERNAL MEDICINE

## 2025-01-15 PROCEDURE — 25010000002 ALBUMIN HUMAN 5% PER 50 ML: Performed by: NURSE PRACTITIONER

## 2025-01-15 PROCEDURE — 99232 SBSQ HOSP IP/OBS MODERATE 35: CPT | Performed by: INTERNAL MEDICINE

## 2025-01-15 PROCEDURE — P9045 ALBUMIN (HUMAN), 5%, 250 ML: HCPCS | Performed by: NURSE PRACTITIONER

## 2025-01-15 PROCEDURE — 25010000002 MAGNESIUM SULFATE IN D5W 1G/100ML (PREMIX) 1-5 GM/100ML-% SOLUTION: Performed by: NURSE PRACTITIONER

## 2025-01-15 PROCEDURE — 85610 PROTHROMBIN TIME: CPT | Performed by: NURSE PRACTITIONER

## 2025-01-15 PROCEDURE — 84132 ASSAY OF SERUM POTASSIUM: CPT | Performed by: THORACIC SURGERY (CARDIOTHORACIC VASCULAR SURGERY)

## 2025-01-15 PROCEDURE — 85025 COMPLETE CBC W/AUTO DIFF WBC: CPT | Performed by: NURSE PRACTITIONER

## 2025-01-15 PROCEDURE — 25010000002 ONDANSETRON PER 1 MG: Performed by: NURSE PRACTITIONER

## 2025-01-15 PROCEDURE — 25010000002 MORPHINE PER 10 MG: Performed by: NURSE PRACTITIONER

## 2025-01-15 PROCEDURE — 82948 REAGENT STRIP/BLOOD GLUCOSE: CPT

## 2025-01-15 PROCEDURE — 71045 X-RAY EXAM CHEST 1 VIEW: CPT

## 2025-01-15 PROCEDURE — 82803 BLOOD GASES ANY COMBINATION: CPT

## 2025-01-15 RX ORDER — ALBUMIN HUMAN 50 G/1000ML
12.5 SOLUTION INTRAVENOUS ONCE
Status: COMPLETED | OUTPATIENT
Start: 2025-01-15 | End: 2025-01-15

## 2025-01-15 RX ORDER — ALBUMIN HUMAN 50 G/1000ML
SOLUTION INTRAVENOUS
Status: ACTIVE
Start: 2025-01-15 | End: 2025-01-15

## 2025-01-15 RX ADMIN — ACETAMINOPHEN 650 MG: 325 TABLET, FILM COATED ORAL at 21:51

## 2025-01-15 RX ADMIN — ATORVASTATIN CALCIUM 40 MG: 40 TABLET, FILM COATED ORAL at 21:51

## 2025-01-15 RX ADMIN — HYDROCODONE BITARTRATE AND ACETAMINOPHEN 1 TABLET: 5; 325 TABLET ORAL at 06:52

## 2025-01-15 RX ADMIN — CEFAZOLIN 2 G: 2 INJECTION, POWDER, FOR SOLUTION INTRAMUSCULAR; INTRAVENOUS at 21:51

## 2025-01-15 RX ADMIN — ONDANSETRON 4 MG: 2 INJECTION INTRAMUSCULAR; INTRAVENOUS at 21:51

## 2025-01-15 RX ADMIN — MORPHINE SULFATE 2 MG: 2 INJECTION, SOLUTION INTRAMUSCULAR; INTRAVENOUS at 23:13

## 2025-01-15 RX ADMIN — ALBUMIN (HUMAN) 12.5 G: 2.5 SOLUTION INTRAVENOUS at 09:29

## 2025-01-15 RX ADMIN — MORPHINE SULFATE 2 MG: 2 INJECTION, SOLUTION INTRAMUSCULAR; INTRAVENOUS at 12:20

## 2025-01-15 RX ADMIN — MUPIROCIN 1 APPLICATION: 20 OINTMENT TOPICAL at 08:01

## 2025-01-15 RX ADMIN — CHLORHEXIDINE GLUCONATE, 0.12% ORAL RINSE 15 ML: 1.2 SOLUTION DENTAL at 21:50

## 2025-01-15 RX ADMIN — CEFAZOLIN 2 G: 2 INJECTION, POWDER, FOR SOLUTION INTRAMUSCULAR; INTRAVENOUS at 06:12

## 2025-01-15 RX ADMIN — ASPIRIN 81 MG: 81 TABLET, COATED ORAL at 08:00

## 2025-01-15 RX ADMIN — POLYETHYLENE GLYCOL 3350 17 G: 17 POWDER, FOR SOLUTION ORAL at 21:51

## 2025-01-15 RX ADMIN — MORPHINE SULFATE 2 MG: 2 INJECTION, SOLUTION INTRAMUSCULAR; INTRAVENOUS at 04:06

## 2025-01-15 RX ADMIN — MAGNESIUM SULFATE HEPTAHYDRATE 1 G: 1 INJECTION, SOLUTION INTRAVENOUS at 06:11

## 2025-01-15 RX ADMIN — SENNOSIDES AND DOCUSATE SODIUM 2 TABLET: 50; 8.6 TABLET ORAL at 21:51

## 2025-01-15 RX ADMIN — ONDANSETRON 4 MG: 2 INJECTION INTRAMUSCULAR; INTRAVENOUS at 15:04

## 2025-01-15 RX ADMIN — POTASSIUM CHLORIDE 20 MEQ: 29.8 INJECTION, SOLUTION INTRAVENOUS at 00:17

## 2025-01-15 RX ADMIN — MUPIROCIN 1 APPLICATION: 20 OINTMENT TOPICAL at 21:51

## 2025-01-15 RX ADMIN — CYCLOBENZAPRINE HYDROCHLORIDE 5 MG: 10 TABLET, FILM COATED ORAL at 03:23

## 2025-01-15 RX ADMIN — SENNOSIDES AND DOCUSATE SODIUM 2 TABLET: 50; 8.6 TABLET ORAL at 08:00

## 2025-01-15 RX ADMIN — ACETAMINOPHEN 1000 MG: 1000 INJECTION INTRAVENOUS at 06:11

## 2025-01-15 RX ADMIN — CYCLOBENZAPRINE HYDROCHLORIDE 5 MG: 10 TABLET, FILM COATED ORAL at 21:51

## 2025-01-15 RX ADMIN — POTASSIUM CHLORIDE 20 MEQ: 29.8 INJECTION, SOLUTION INTRAVENOUS at 02:07

## 2025-01-15 RX ADMIN — HYDROCODONE BITARTRATE AND ACETAMINOPHEN 1 TABLET: 5; 325 TABLET ORAL at 00:17

## 2025-01-15 RX ADMIN — HYDROCODONE BITARTRATE AND ACETAMINOPHEN 1 TABLET: 5; 325 TABLET ORAL at 14:31

## 2025-01-15 RX ADMIN — MAGNESIUM SULFATE HEPTAHYDRATE 1 G: 1 INJECTION, SOLUTION INTRAVENOUS at 13:35

## 2025-01-15 RX ADMIN — ENOXAPARIN SODIUM 40 MG: 100 INJECTION SUBCUTANEOUS at 15:04

## 2025-01-15 RX ADMIN — CHLORHEXIDINE GLUCONATE, 0.12% ORAL RINSE 15 ML: 1.2 SOLUTION DENTAL at 08:01

## 2025-01-15 RX ADMIN — CEFAZOLIN 2 G: 2 INJECTION, POWDER, FOR SOLUTION INTRAMUSCULAR; INTRAVENOUS at 13:35

## 2025-01-15 RX ADMIN — POLYETHYLENE GLYCOL 3350 17 G: 17 POWDER, FOR SOLUTION ORAL at 08:01

## 2025-01-15 RX ADMIN — MORPHINE SULFATE 2 MG: 2 INJECTION, SOLUTION INTRAMUSCULAR; INTRAVENOUS at 01:24

## 2025-01-15 NOTE — PLAN OF CARE
Goal Outcome Evaluation:  Plan of Care Reviewed With: patient           Outcome Evaluation: Pt is a 69 y.o. year old female s/p AVR on 1/14/25.    Pmhx significant for L BBB, PNA.    At baseline, pt resides with spouse, father-in-law, and adult grandson in home. Home is multi-level with elevator to basement, and 5 steps to enter. Pt is able to stay at main level, and her bathroom has a walk-in shower. Oriented x4. Edu on sternal precautions. Patient recalls 1/3 sternal precautions, requiring min verbal cuing for precautions. Pt requires Min a for transfers and Min verbal cuing for sternal precautions. Pt ambulates with Min A. Mod A for UB Adls. Max A for LB Adls. OT provided and reviewed Cardiac Rehab HEP. Pt completes 1 set x 10 reps of each exercise with fair understanding. Encouraged to complete 3x/daily to facilitate increased activity tolerance. Pt will require additional education to ensure carryover. Anticipate patient will d/c home with family when medically stable.    Anticipated Discharge Disposition (OT): home with assist

## 2025-01-15 NOTE — PLAN OF CARE
Goal Outcome Evaluation:  Plan of Care Reviewed With: patient, spouse           Outcome Evaluation: 70 yo female seen s/p avr and RICH closure on 1/14/25. Pt now POD1. PMH: recent pna, chf, L bbb, pulmonary htn. At baseline, pt lives in single level function home w/ elevator access. Lives w/  and 94 yo father in law. Pt normally ambulates community distances w/o assistive device. No home O2. Today, pt is alert and oriented but painful. Demonstrates moderate weakness in B hips, but this may be due to pain. Able to come to stand, then ambulate 120 ft w/ rw and min assist. Vital signs remain stable w/ activity. Recommend home w/ family at d/c. Will follow to assure she is progressing well along cardiac protocol.

## 2025-01-15 NOTE — OUTREACH NOTE
CHF Week 2 Survey      Flowsheet Row Responses   [facility] facility patient discharged from? [facility]   Does the patient have one of the following disease processes/diagnoses(primary or secondary)? CHF   Week 2 attempt successful? No   Unsuccessful attempts Attempt 1   Revoke Readmitted            Caterina CHANG - Registered Nurse

## 2025-01-15 NOTE — THERAPY EVALUATION
Patient Name: Rachael Rodriguez  : 1955    MRN: [id number]                              Today's Date: 1/15/2025       Admit Date: 2025    Visit Dx:     ICD-10-CM ICD-9-CM   1. Nonrheumatic aortic valve stenosis  I35.0 424.1     Patient Active Problem List   Diagnosis    CHF (congestive heart failure)    Multifocal pneumonia    LBBB (left bundle branch block)    Pneumonia, unspecified organism    Acute on chronic HFrEF (heart failure with reduced ejection fraction)    Nonrheumatic aortic valve stenosis    Nonrheumatic aortic (valve) stenosis     Past Medical History:   Diagnosis Date    Hypertension      Past Surgical History:   Procedure Laterality Date    CARDIAC CATHETERIZATION Right 2024    Procedure: Right and Left Heart Cath;  Surgeon: Makayla Driscoll MD;  Location: [facility] CATH INVASIVE LOCATION;  Service: Cardiovascular;  Laterality: Right;  Local and IV sedation      General Information       Row Name 01/15/25 1448          Physical Therapy Time and Intention    Document Type evaluation  -CM     Mode of Treatment physical therapy  -CM       Row Name 01/15/25 1448          General Information    Patient Profile Reviewed yes  -CM     Prior Level of Function independent:;community mobility;gait  -CM     Existing Precautions/Restrictions sternal;cardiac  -CM     Barriers to Rehab medically complex  -CM       Row Name 01/15/25 1448          Living Environment    People in Home spouse  -CM       Row Name 01/15/25 1448          Home Main Entrance    Number of Stairs, Main Entrance none;other (see comments)  has elevator access if needed; can also enter via 2 or 4 steps.  -CM       Row Name 01/15/25 1448          Stairs Within Home, Primary    Number of Stairs, Within Home, Primary none  -CM       Row Name 01/15/25 1448          Cognition    Orientation Status (Cognition) oriented x 4  -CM       Row Name 01/15/25 1448          Safety Issues/Impairments Affecting Functional Mobility     Impairments Affecting Function (Mobility) balance;pain;strength;endurance/activity tolerance  -CM               User Key  (r) = Recorded By, (t) = Taken By, (c) = Cosigned By      Initials Name Provider Type    Kiya Castillo, PT Physical Therapist                   Mobility       Row Name 01/15/25 1450          Bed Mobility    Bed Mobility bed mobility (all) activities  -CM     All Activities, [place] (Bed Mobility) not tested  -CM     Comment, (Bed Mobility) in chair when PT arrived  -CM       Row Name 01/15/25 1450          Sit-Stand Transfer    Sit-Stand [place] (Transfers) minimum assist (75% patient effort);1 person assist;1 person to manage equipment  -CM     Assistive Device (Sit-Stand Transfers) walker, front-wheeled  -CM       Row Name 01/15/25 1450          Gait/Stairs (Locomotion)    [place] Level (Gait) minimum assist (75% patient effort);1 person assist;1 person to manage equipment  -CM     Assistive Device (Gait) walker, front-wheeled  -CM     Patient was able to Ambulate yes  -CM     Distance in Feet (Gait) 120  slow brian; guarded posturing  -CM       Row Name 01/15/25 1450          Mobility    Extremity Weight-bearing Status left upper extremity;right upper extremity  -CM     Left Upper Extremity (Weight-bearing Status) touch down weight-bearing (TDWB)  -CM     Right Upper Extremity (Weight-bearing Status) touch down weight-bearing (TDWB)  -CM               User Key  (r) = Recorded By, (t) = Taken By, (c) = Cosigned By      Initials Name Provider Type    Kiya Castillo, PT Physical Therapist                   Obj/Interventions       Row Name 01/15/25 1451          Range of Motion Comprehensive    General Range of Motion bilateral lower extremity ROM WFL  -CM       Row Name 01/15/25 1451          Strength Comprehensive (MMT)    General Manual Muscle Testing (MMT) Assessment lower extremity strength deficits identified  -CM     Comment, General Manual Muscle Testing (MMT)  Assessment hips 3/5 on L, 3+/5 on R; pt denies pain but winces w/ testing. All other LE mm groups 5/5  -CM       Row Name 01/15/25 1451          Balance    Balance Assessment sitting static balance;sitting dynamic balance;standing static balance;standing dynamic balance  -CM     Static Sitting Balance modified independence  -CM     Dynamic Sitting Balance modified independence  -CM     Position, Sitting Balance sitting in chair  -CM     Static Standing Balance contact guard  -CM     Dynamic Standing Balance contact guard;minimal assist  -CM     Position/Device Used, Standing Balance supported;walker, rolling  -CM       Row Name 01/15/25 1451          Sensory Assessment (Somatosensory)    Sensory Assessment (Somatosensory) sensation intact  -CM               User Key  (r) = Recorded By, (t) = Taken By, (c) = Cosigned By      Initials Name Provider Type    Kiya Castillo, PT Physical Therapist                   Goals/Plan       Row Name 01/15/25 1501          Bed Mobility Goal 1 (PT)    Activity/Assistive Device (Bed Mobility Goal 1, PT) bed mobility activities, all  -CM     [place] Level/Cues Needed (Bed Mobility Goal 1, PT) contact guard required  -CM     Time Frame (Bed Mobility Goal 1, PT) 2 weeks  -CM       Row Name 01/15/25 1501          Transfer Goal 1 (PT)    Activity/Assistive Device (Transfer Goal 1, PT) transfers, all  -CM     [place] Level/Cues Needed (Transfer Goal 1, PT) modified independence  -CM     Time Frame (Transfer Goal 1, PT) 2 weeks  -CM       Row Name 01/15/25 1501          Gait Training Goal 1 (PT)    Activity/Assistive Device (Gait Training Goal 1, PT) gait (walking locomotion)  -CM     [place] Level (Gait Training Goal 1, PT) independent  -CM     Distance (Gait Training Goal 1, PT) 400 ft, no loss of balance or soa  -CM     Time Frame (Gait Training Goal 1, PT) 2 weeks  -CM       Row Name 01/15/25 1501          Therapy Assessment/Plan (PT)    Planned Therapy  Interventions (PT) balance training;bed mobility training;gait training;home exercise program;transfer training;strengthening;ROM (range of motion);postural re-education;patient/family education  -CM               User Key  (r) = Recorded By, (t) = Taken By, (c) = Cosigned By      Initials Name Provider Type    CM Kiya Lea, PT Physical Therapist                   Clinical Impression       Row Name 01/15/25 1452          Pain    Pretreatment Pain Rating 3/10  -CM     Posttreatment Pain Rating 3/10  -CM     Pain Location other (see comments);chest  incisional  -CM     Pain Side/Orientation anterior;medial  -CM     Pain Management Interventions activity modification encouraged;exercise or physical activity utilized;diversional activity provided;breathing exercises utilized;premedicated for activity  -CM     Response to Pain Interventions activity level improved;functional ability improved;activity participation with tolerable pain  -CM       Row Name 01/15/25 1454          Plan of Care Review    Plan of Care Reviewed With patient;spouse  -CM     Outcome Evaluation 68 yo female seen s/p avr and RICH closure on 1/14/25. Pt now POD1. PMH: recent pna, chf, L bbb, pulmonary htn. At baseline, pt lives in single level function home w/ elevator access. Lives w/  and 96 yo father in law. Pt normally ambulates community distances w/o assistive device. No home O2. Today, pt is alert and oriented but painful. Demonstrates moderate weakness in B hips, but this may be due to pain. Able to come to stand, then ambulate 120 ft w/ rw and min assist. Vital signs remain stable w/ activity. Recommend home w/ family at d/c. Will follow to assure she is progressing well along cardiac protocol.  -CM       Row Name 01/15/25 1500          Therapy Assessment/Plan (PT)    Rehab Potential (PT) good  -CM     Criteria for Skilled Interventions Met (PT) yes;meets criteria;skilled treatment is necessary  -CM     Therapy Frequency (PT) 5  times/wk  -CM     Predicted Duration of Therapy Intervention (PT) until d/c  -CM       Row Name 01/15/25 1500          Vital Signs    Pre Systolic BP Rehab 105  -CM     Pre Treatment Diastolic BP 52  (65)  -CM     Post Systolic BP Rehab 113  -CM     Post Treatment Diastolic BP 58  (71)  -CM     Pretreatment Heart Rate (beats/min) 81  -CM     Pre SpO2 (%) 94  -CM     O2 Delivery Pre Treatment room air  -CM     O2 Delivery Intra Treatment room air  -CM     Post SpO2 (%) 95  -CM     O2 Delivery Post Treatment room air  -CM     Recovery Time VSS throughout rx  -CM       Row Name 01/15/25 1500          Positioning and Restraints    Pre-Treatment Position sitting in chair/recliner  -CM     Post Treatment Position chair  -CM     In Chair notified nsg;sitting;call light within reach;encouraged to call for assist;with family/caregiver;heels elevated;legs elevated  -CM               User Key  (r) = Recorded By, (t) = Taken By, (c) = Cosigned By      Initials Name Provider Type    Kiya Castillo, PT Physical Therapist                   Outcome Measures       Row Name 01/15/25 1502 01/15/25 0800       How much help from another person do you currently need...    Turning from your back to your side while in flat bed without using bedrails? 3  -CM 3  -IM    Moving from lying on back to sitting on the side of a flat bed without bedrails? 2  -CM 3  -IM    Moving to and from a bed to a chair (including a wheelchair)? 3  -CM 3  -IM    Standing up from a chair using your arms (e.g., wheelchair, bedside chair)? 3  -CM 4  -IM    Climbing 3-5 steps with a railing? 2  -CM 2  -IM    To walk in hospital room? 3  -CM 3  -IM    AM-PAC 6 Clicks Score (PT) 16  -CM 18  -IM    Highest Level of Mobility Goal 5 --> Static standing  -CM 6 --> Walk 10 steps or more  -IM      Row Name 01/15/25 1502          Functional Assessment    Outcome Measure Options AM-PAC 6 Clicks Basic Mobility (PT)  -CM               User Key  (r) = Recorded By, (t) =  Taken By, (c) = Cosigned By      Initials Name Provider Type    Kiya Castillo, PT Physical Therapist    Wu Abdalla, RN Registered Nurse                                 Physical Therapy Education       Title: PT OT SLP Therapies (Done)       Topic: Physical Therapy (Done)       Point: Mobility training (Done)       Learning Progress Summary            Patient Acceptance, E,TB, VU,DU by CM at 1/15/2025 1503   Significant Other Acceptance, E,TB, VU,DU by CM at 1/15/2025 1503                      Point: Home exercise program (Done)       Learning Progress Summary            Patient Acceptance, E,TB, VU,DU by CM at 1/15/2025 1503   Significant Other Acceptance, E,TB, VU,DU by CM at 1/15/2025 1503                      Point: Body mechanics (Done)       Learning Progress Summary            Patient Acceptance, E,TB, VU,DU by CM at 1/15/2025 1503   Significant Other Acceptance, E,TB, VU,DU by CM at 1/15/2025 1503                      Point: Precautions (Done)       Learning Progress Summary            Patient Acceptance, E,TB, VU,DU by CM at 1/15/2025 1503   Significant Other Acceptance, E,TB, VU,DU by CM at 1/15/2025 1503                                      User Key       Initials Effective Dates Name Provider Type Discipline     06/16/21 -  Kiya Lea, PT Physical Therapist PT                  PT Recommendation and Plan  Planned Therapy Interventions (PT): balance training, bed mobility training, gait training, home exercise program, transfer training, strengthening, ROM (range of motion), postural re-education, patient/family education  Outcome Evaluation: 70 yo female seen s/p avr and RICH closure on 1/14/25. Pt now POD1. PMH: recent pna, chf, L bbb, pulmonary htn. At baseline, pt lives in single level function home w/ elevator access. Lives w/  and 96 yo father in law. Pt normally ambulates community distances w/o assistive device. No home O2. Today, pt is alert and oriented but painful.  Demonstrates moderate weakness in B hips, but this may be due to pain. Able to come to stand, then ambulate 120 ft w/ rw and min assist. Vital signs remain stable w/ activity. Recommend home w/ family at d/c. Will follow to assure she is progressing well along cardiac protocol.     Time Calculation:   PT Evaluation Complexity  History, PT Evaluation Complexity: 3 or more personal factors and/or comorbidities  Examination of Body Systems (PT Eval Complexity): total of 4 or more elements  Clinical Presentation (PT Evaluation Complexity): unstable  Clinical Decision Making (PT Evaluation Complexity): high complexity  Overall Complexity (PT Evaluation Complexity): high complexity     PT Charges       Row Name 01/15/25 1503             Time Calculation    Start Time 1340  -CM      Stop Time 1406  -CM      Time Calculation (min) 26 min  -CM      PT Received On 01/15/25  -CM      PT - Next Appointment 01/16/25  -CM      PT Goal Re-Cert Due Date 01/29/25  -CM         Time Calculation- PT    Total Timed Code Minutes- PT 0 minute(s)  -CM                User Key  (r) = Recorded By, (t) = Taken By, (c) = Cosigned By      Initials Name Provider Type    Kiya Castillo, PT Physical Therapist                  Therapy Charges for Today       Code Description Service Date Service Provider Modifiers Qty    [id number]  PT EVAL HIGH COMPLEXITY 4 1/15/2025 Kiya Lea, PT GP 1            PT G-Codes  Outcome Measure Options: AM-PAC 6 Clicks Basic Mobility (PT)  AM-PAC 6 Clicks Score (PT): 16  PT Discharge Summary  Anticipated Discharge Disposition (PT): home with assist    Kiya Lea, PT  1/15/2025

## 2025-01-15 NOTE — CASE MANAGEMENT/SOCIAL WORK
Continued Stay Note  [facility]     Patient Name: Rachael Rodriguez  MRN: [id number]  Today's Date: 1/15/2025    Admit Date: 1/14/2025    Plan: DC Plan: Pending clinical course and PT/OT evals. From home with family. AVR 1/14/2025   Discharge Plan       Row Name 01/15/25 1238       Plan    Plan DC Plan: Pending clinical course and PT/OT evals. From home with family. AVR 1/14/2025    Patient/Family in Agreement with Plan yes    Provided Post Acute Provider List? N/A    Provided Post Acute Provider Quality & Resource List? N/A    Plan Comments CM spoke with patient’s nurse and [facility] NP Gris Willis to obtain clinical updates. Awaiting PT/OT evaluations for recommendations.No significant changes in condition or care plans to report.CM will continue to follow for any additional needs and adjust DC plan accordingly. DC Barriers:POD 1 OHS, cardiac monitoring, central line, pacer wires, chest tubes x2, Insulin gtt, IV abx/electrolytes, and monitor labs.                 Expected Discharge Date and Time       Expected Discharge Date Expected Discharge Time    Jan 19, 2025               Nataly Gauthier RN    Office Phone: [phone]  Office Cell:     [phone]

## 2025-01-15 NOTE — PROGRESS NOTES
S/P POD# 1 elective tissue AVR/ RICH closure--Reed  EF 45-50% (BENJAMIN)    Subjective:  reports rough night but feeling better    Extubated ~2125  Drips:  insulin  CI 3, CVP 5, SVR 1157  Wt is up 1 kg from preop  UF -1.9L  CXR:  left base atel  MVO2 sat:  72.8      Intake/Output Summary (Last 24 hours) at 1/15/2025 0846  Last data filed at 1/15/2025 0600  Gross per 24 hour   Intake 4043 ml   Output 4899 ml   Net -856 ml     Temp:  [96.4 °F (35.8 °C)-99 °F (37.2 °C)] 98.8 °F (37.1 °C)  Heart Rate:  [61-99] 82  Resp:  [12-14] 13  BP: ()/(51-95) 106/65  FiO2 (%):  [40 %-70 %] 40 %  CT 70/8    Results from last 7 days   Lab Units 01/15/25  0330 01/14/25  2116 01/14/25  1632 01/14/25  1628 01/14/25  1450   WBC 10*3/mm3 14.10*  --   --  18.39* 10.87*   HEMOGLOBIN g/dL 10.8*  --   --  12.6 8.4*   HEMOGLOBIN, POC g/dL  --  11.1*   < >  --   --    HEMATOCRIT % 33.1*  --   --  38.1 25.1*   HEMATOCRIT POC %  --  33*   < >  --   --    PLATELETS 10*3/mm3 108*  --   --  136* 130*   INR  1.38*  --   --  1.53* 1.81*    < > = values in this interval not displayed.     Results from last 7 days   Lab Units 01/15/25  0806 01/15/25  0330   CREATININE mg/dL  --  0.72   POTASSIUM mmol/L 4.3 4.9   SODIUM mmol/L  --  151*   MAGNESIUM mg/dL  --  2.8*   PHOSPHORUS mg/dL  --  4.2     ica 1.29    Physical Exam:  Neuro intact, nad, up in recliner,  at bedside  Tele:  AAI 87, underlying SR  Diminished bases, 97% RA  dsg c/d/i, no drainage  Benign abd, no BM, + emesis last evening  No edema    Assessment/Plan:  Principal Problem:    Nonrheumatic aortic valve stenosis  Active Problems:    Nonrheumatic aortic (valve) stenosis    - Severe aortic stenosis/ mild to moderate mitral regurgitation, EF 45-50% (BENJAMIN)--s/p elective tissue AVR (27 mm Avalus ultra [employer])/ left atrial appendage epicardial closure 45 mm atrial clip device (Pagni)  - Chronic HFmrEF, NYHA class III--GDMT  - Moderate to severe pHTN--PAP 45/28, mean 36 on RHC  - HTN with  grade II diastolic dysfunction  - HLD--statin  - Postop leukocytosis, likely reactive--pulm toileting  - Postop ABLA, expected--watch closely  - Postop TCP, consumptive--watch closely  - Postop hypernatremia r/t intravascularly dry--encourage oral fluids    POD# 1.  Doing well.  DC everardo/salud.  CVP q4 hrs.  Albumin x1 given hypernatremia/ low CVP.  Mobilize.  On asa/statin, hold bb for now and re-evaluate later today.    Routine care--as above  De-escal  D/w pt/family, nsg, Dr. Mcbride  Anticipate home at discharge    Thalia Mora, APRN  1/15/2025  08:46 EST

## 2025-01-15 NOTE — PROGRESS NOTES
Cardiology [facility]      Patient Care Team:  Alvaro Cardona MD as PCP - General (General Surgery)  Provider, No Known as PCP - Family Medicine         Cardiology assessment and plan        Severe aortic stenosis status post arctic valve replacement surgery with [employer] pericardial prosthesis 27 mm  Atrial appendage closure with a atrial clip  Pulmonary hypertension  Diastolic heart failure with LV ejection fraction of 45 to 50%  Hypertension  Hyperlipidemia  Normal thyroid function  Moderate to severe pulmonary hypertension  Mild mitral regurgitation  Cardiac catheterization with normal coronaries and moderate pulmonary hypertension  Patient is currently being paced and underlying rhythm is sinus rhythm with a left bundle branch block  Postop day 1  Patient is currently being paced  Hemodynamics are stable  Extubated last night  Cardiac index is 3  CVP is 5  Chest x-ray with no pneumothorax  Patient is clinically hemodynamically stable  Plans to de-escalate the surgical care  Start p.o. medications  Hold beta-blockers for now  Continue postsurgical care  Discussed with nursing staff  Continue current medical management  Further recommendation based on patient course      Chief Complaint: Shortness of breath    Subjective no new complaints    Interval History: No significant change in overall status    History taken from: patient    Review of Systems:  Review of Systems   Constitutional: Negative for chills, decreased appetite and malaise/fatigue.   HENT:  Negative for congestion and nosebleeds.    Eyes:  Negative for blurred vision and double vision.   Cardiovascular:  Negative for chest pain, dyspnea on exertion, irregular heartbeat, leg swelling, near-syncope, orthopnea, palpitations and paroxysmal nocturnal dyspnea.   Respiratory:  Negative for cough and shortness of breath.    Hematologic/Lymphatic: Negative for adenopathy. Does not bruise/bleed easily.   Skin:  Negative for color change and rash.  "  Musculoskeletal:  Negative for back pain and joint pain.   Gastrointestinal:  Negative for bloating, abdominal pain, hematemesis and hematochezia.   Genitourinary:  Negative for flank pain and hematuria.   Neurological:  Negative for dizziness and focal weakness.   Psychiatric/Behavioral:  Negative for altered mental status. The patient does not have insomnia.        Objective    Vital Signs  Visit Vitals  /62   Pulse 83   Temp 98.1 °F (36.7 °C) (Oral)   Resp 17   Ht 144.8 cm (57\")   Wt 66.4 kg (146 lb 6.4 oz)   SpO2 95%   BMI 31.68 kg/m²     Oxygen Therapy  SpO2: 95 %  Pulse Oximetry Type: Continuous  Device (Oxygen Therapy): room air  Flow (L/min) (Oxygen Therapy): 4  Oxygen Concentration (%): 40  Flowsheet Rows      Flowsheet Row First Filed Value   Admission Height 144.8 cm (57\") Documented at 01/14/2025 0646   Admission Weight 65.7 kg (144 lb 12.8 oz) Documented at 01/14/2025 0646          Intake & Output (last 3 days)         01/12 0701  01/13 0700 01/13 0701  01/14 0700 01/14 0701  01/15 0700 01/15 0701  01/16 0700    P.O.   300 720    I.V. (mL/kg)   1993 (30)     Blood   600     IV Piggyback   1150     Total Intake(mL/kg)   4043 (60.9) 720 (10.8)    Urine (mL/kg/hr)   2380 (1.5) 220 (0.3)    Blood   500     Chest Tube   149 100    Dialysis   1900     Total Output   4929 320    Net   -886 +400            Urine Unmeasured Occurrence   2 x           Lines, Drains & Airways       Active LDAs       Name Placement date Placement time Site Days    CVC Double Lumen 01/14/25 Right Internal jugular 01/14/25  1323  created via procedure documentation  Internal jugular  1    Urethral Catheter Temperature probe 16 Fr. 01/14/25  --  -- 1    Y Chest Tube 1 and 2 Mediastinal 28 Fr. Mediastinal 28 Fr. 01/14/25  --  -- 1    Pacer Wires 01/14/25  --  Atrial and Ventricular  1                    Physical Exam:  Constitutional:       Appearance: Well-developed.   Eyes:      Conjunctiva/sclera: Conjunctivae normal.      " Pupils: Pupils are equal, round, and reactive to light.   HENT:      Head: Normocephalic and atraumatic.   Neck:      Thyroid: No thyromegaly.   Pulmonary:      Effort: Pulmonary effort is normal.      Breath sounds: Normal breath sounds. Examination of the right-lower field reveals decreased breath sounds. Examination of the left-lower field reveals decreased breath sounds.   Cardiovascular:      Normal rate. Regular rhythm.   Pulses:     Intact distal pulses.   Edema:     Peripheral edema absent.   Abdominal:      General: Bowel sounds are normal.      Palpations: Abdomen is soft.   Musculoskeletal:      Cervical back: Normal range of motion and neck supple. Skin:     General: Skin is warm.   Neurological:      Mental Status: Alert and oriented to person, place, and time.     Extubated  Alert and awake  Chest tubes are in place  Currently being paced    Results Review:     I reviewed the patient's new clinical results.    Lab Results (last 24 hours)       Procedure Component Value Units Date/Time    POC Glucose Once [[id number]]  (Abnormal) Collected: 01/15/25 1810    Specimen: Blood Updated: 01/15/25 1814     Glucose 123 mg/dL      Comment: Serial Number: [id number]:  [id number]       POC Glucose Once [[id number]]  (Abnormal) Collected: 01/15/25 1705    Specimen: Blood Updated: 01/15/25 1708     Glucose 132 mg/dL      Comment: Serial Number: [id number]:  [id number]       POC Glucose Once [[id number]]  (Abnormal) Collected: 01/15/25 1514    Specimen: Blood Updated: 01/15/25 1516     Glucose 137 mg/dL      Comment: Serial Number: [id number]:  [id number]       POC Glucose Once [[id number]]  (Abnormal) Collected: 01/15/25 1330    Specimen: Blood Updated: 01/15/25 1331     Glucose 150 mg/dL      Comment: Serial Number: [id number]:  [id number]       POC Glucose Once [[id number]]  (Abnormal) Collected: 01/15/25 0952    Specimen: Blood Updated: 01/15/25 0954     Glucose 123 mg/dL      Comment: Serial  Number: [id number]:  [id number]       Potassium [[id number]]  (Normal) Collected: 01/15/25 0806    Specimen: Blood Updated: 01/15/25 0838     Potassium 4.3 mmol/L     POC Glucose Once [[id number]]  (Abnormal) Collected: 01/15/25 0728    Specimen: Blood Updated: 01/15/25 0731     Glucose 121 mg/dL      Comment: Serial Number: [id number]:  [id number]       Tissue Pathology Exam [[id number]] Collected: 01/14/25 1337    Specimen: Tissue from Aortic valve Updated: 01/15/25 0727    POC Glucose Once [[id number]]  (Abnormal) Collected: 01/15/25 0635    Specimen: Blood Updated: 01/15/25 0637     Glucose 118 mg/dL      Comment: Serial Number: [id number]:  [id number]       POC Glucose Once [[id number]]  (Abnormal) Collected: 01/15/25 0449    Specimen: Blood Updated: 01/15/25 0451     Glucose 121 mg/dL      Comment: Serial Number: [id number]:  [id number]       Renal Function Panel [[id number]]  (Abnormal) Collected: 01/15/25 0330    Specimen: Blood Updated: 01/15/25 0417     Glucose 132 mg/dL      BUN 15 mg/dL      Creatinine 0.72 mg/dL      Sodium 151 mmol/L      Potassium 4.9 mmol/L      Chloride 119 mmol/L      CO2 22.4 mmol/L      Calcium 9.8 mg/dL      Albumin 4.8 g/dL      Phosphorus 4.2 mg/dL      Anion Gap 9.6 mmol/L      BUN/Creatinine Ratio 20.8     eGFR 91.2 mL/min/1.73     Narrative:      GFR Categories in Chronic Kidney Disease (CKD)      GFR Category          GFR (mL/min/1.73)    Interpretation  G1                     90 or greater         Normal or high (1)  G2                      60-89                Mild decrease (1)  G3a                   45-59                Mild to moderate decrease  G3b                   30-44                Moderate to severe decrease  G4                    15-29                Severe decrease  G5                    14 or less           Kidney failure          (1)In the absence of evidence of kidney disease, neither GFR category G1 or G2 fulfill the criteria for  CKD.    eGFR calculation 2021 CKD-EPI creatinine equation, which does not include race as a factor    Magnesium [[id number]]  (Abnormal) Collected: 01/15/25 0330    Specimen: Blood Updated: 01/15/25 0417     Magnesium 2.8 mg/dL     Calcium, Ionized [[id number]]  (Normal) Collected: 01/15/25 0330    Specimen: Blood Updated: 01/15/25 0356     Ionized Calcium 1.29 mmol/L     Protime-INR [[id number]]  (Abnormal) Collected: 01/15/25 0330    Specimen: Blood Updated: 01/15/25 0349     Protime 17.1 Seconds      INR 1.38    CBC & Differential [[id number]]  (Abnormal) Collected: 01/15/25 0330    Specimen: Blood Updated: 01/15/25 0342    Narrative:      The following orders were created for panel order CBC & Differential.  Procedure                               Abnormality         Status                     ---------                               -----------         ------                     CBC Auto Differential[[id number]]        Abnormal            Final result                 Please view results for these tests on the individual orders.    CBC Auto Differential [[id number]]  (Abnormal) Collected: 01/15/25 0330    Specimen: Blood Updated: 01/15/25 0342     WBC 14.10 10*3/mm3      RBC 3.93 10*6/mm3      Hemoglobin 10.8 g/dL      Hematocrit 33.1 %      MCV 84.2 fL      MCH 27.5 pg      MCHC 32.6 g/dL      RDW 14.0 %      RDW-SD 43.5 fl      MPV 10.8 fL      Platelets 108 10*3/mm3      Neutrophil % 88.7 %      Lymphocyte % 5.0 %      Monocyte % 5.7 %      Eosinophil % 0.0 %      Basophil % 0.1 %      Immature Grans % 0.5 %      Neutrophils, Absolute 12.50 10*3/mm3      Lymphocytes, Absolute 0.71 10*3/mm3      Monocytes, Absolute 0.81 10*3/mm3      Eosinophils, Absolute 0.00 10*3/mm3      Basophils, Absolute 0.01 10*3/mm3      Immature Grans, Absolute 0.07 10*3/mm3      nRBC 0.0 /100 WBC     Blood Gas, Mixed [[id number]]  (Abnormal) Collected: 01/15/25 0316    Specimen: Blood Updated: 01/15/25 0332     pH, mixed 7.35 pH units       PCO2 MIXED 45.0 mmHg      PO2 MIXED 40.8 mmHg      HCO3 MIXED 24.6 mmol/L      CO2 Content 25.9 mmol/L      Base Excess, Arterial -1.3 mmol/L      Comment: Serial Number: [id number]:  [id number]        O2 SATURATION MIXED 72.8 %      Hemodilution No     Site Swanz Oscar     Denver's Test N/A     Modality Cannula     FIO2 36 %      Base Deficit -1.1 mEq/liter      Notified Who --     Notified Time --     Read Back --    POC Glucose Once [[id number]]  (Abnormal) Collected: 01/15/25 0316    Specimen: Blood Updated: 01/15/25 0320     Glucose 129 mg/dL      Comment: Serial Number: [id number]:  [id number]       POC Glucose Once [[id number]]  (Abnormal) Collected: 01/15/25 0201    Specimen: Blood Updated: 01/15/25 0203     Glucose 142 mg/dL      Comment: Serial Number: [id number]:  [id number]       POC Glucose Once [[id number]]  (Abnormal) Collected: 01/15/25 0030    Specimen: Blood Updated: 01/15/25 0032     Glucose 142 mg/dL      Comment: Serial Number: [id number]:  [id number]       Potassium [[id number]]  (Normal) Collected: 01/14/25 2246    Specimen: Blood Updated: 01/14/25 2314     Potassium 3.6 mmol/L     POC Glucose Once [[id number]]  (Abnormal) Collected: 01/14/25 2246    Specimen: Blood Updated: 01/14/25 2248     Glucose 116 mg/dL      Comment: Serial Number: [id number]:  [id number]       Blood Gas, Arterial - [[id number]]  (Abnormal) Collected: 01/14/25 2229    Specimen: Arterial Blood Updated: 01/14/25 2232     Site Arterial Line     Denver's Test N/A     pH, Arterial 7.363 pH units      pCO2, Arterial 44.2 mm Hg      pO2, Arterial 133.2 mm Hg      HCO3, Arterial 25.1 mmol/L      Base Excess, Arterial -0.4 mmol/L      Comment: Serial Number: [id number]:  [id number]        O2 Saturation, Arterial 98.9 %      CO2 Content 26.5 mmol/L      Barometric Pressure for Blood Gas --     Comment: N/A        Modality Cannula     FIO2 36 %      Hemodilution No     PO2/FIO2 370    POC Glucose Once [[id number]]  (Abnormal)  Collected: 01/14/25 2229    Specimen: Arterial Blood Updated: 01/14/25 2232     Glucose 119 mg/dL      Comment: Serial Number: [id number]:  [id number]       Blood Gas, Arterial - [[id number]]  (Abnormal) Collected: 01/14/25 2116    Specimen: Arterial Blood Updated: 01/14/25 2121     Site Arterial Line     Denver's Test N/A     pH, Arterial 7.351 pH units      pCO2, Arterial 43.6 mm Hg      pO2, Arterial 171.1 mm Hg      HCO3, Arterial 24.1 mmol/L      Base Excess, Arterial -1.5 mmol/L      Comment: Serial Number: [id number]:  [id number]        O2 Saturation, Arterial 99.5 %      Barometric Pressure for Blood Gas --     Comment: N/A        Modality Adult Vent     FIO2 40 %      Ventilator Mode CPAP/PS     PEEP 5     Hemodilution No     PO2/FIO2 428    POCT Electrolytes +HGB +HCT [[id number]]  (Abnormal) Collected: 01/14/25 2116    Specimen: Arterial Blood Updated: 01/14/25 2121     Sodium 152 mmol/L      POC Potassium 3.7 mmol/L      Ionized Calcium 1.35 mmol/L      Comment: Serial Number: [id number]:  [id number]        Glucose 132 mg/dL      Hematocrit 33 %      Hemoglobin 11.1 g/dL     POC Glucose Once [[id number]]  (Abnormal) Collected: 01/14/25 2116    Specimen: Arterial Blood Updated: 01/14/25 2121     Glucose 132 mg/dL      Comment: Serial Number: [id number]:  [id number]       POC Creatinine [[id number]]  (Normal) Collected: 01/14/25 2116    Specimen: Arterial Blood Updated: 01/14/25 2121     Creatinine 0.74 mg/dL      Comment: Serial Number: [id number]:  [id number]        eGFR 88.3 mL/min/1.73     POC Glucose Once [[id number]]  (Abnormal) Collected: 01/14/25 1933    Specimen: Blood Updated: 01/14/25 1934     Glucose 152 mg/dL      Comment: Serial Number: [id number]:  [id number]       Blood Gas, Arterial - [[id number]]  (Abnormal) Collected: 01/14/25 1825    Specimen: Arterial Blood Updated: 01/14/25 1829     Site Arterial Line     Denver's Test N/A     pH, Arterial 7.386 pH units      pCO2, Arterial 39.8 mm Hg       pO2, Arterial 137.7 mm Hg      HCO3, Arterial 23.8 mmol/L      Base Excess, Arterial -1.1 mmol/L      Comment: Serial Number: [id number]:  [id number]        O2 Saturation, Arterial 99.1 %      CO2 Content 25.1 mmol/L      Barometric Pressure for Blood Gas --     Comment: N/A        Modality Adult Vent     FIO2 40 %      Ventilator Mode AC     Set Tidal Volume 550     PEEP 5     Hemodilution No     Respiratory Rate 12     PO2/FIO2 344    POC Glucose Once [[id number]]  (Abnormal) Collected: 01/14/25 1825    Specimen: Arterial Blood Updated: 01/14/25 1829     Glucose 195 mg/dL      Comment: Serial Number: [id number]:  [id number]             Results for orders placed during the hospital encounter of 12/28/24    Adult Transthoracic Echo Complete W/ Cont if Necessary Per Protocol    Interpretation Summary    Left ventricular ejection fraction appears to be 46 - 50%.    Left ventricular diastolic function is consistent with (grade II w/high LAP) pseudonormalization.    The left atrial cavity is moderate to severely dilated.    Severe aortic valve stenosis is present.    Moderate mitral valve regurgitation is present.    Estimated right ventricular systolic pressure from tricuspid regurgitation is moderately elevated (45-55 mmHg).    Moderate to severe pulmonary hypertension is present.        Medication Review:   I have reviewed the patient's current medication list  Scheduled Meds:albumin human, , ,   aspirin, 81 mg, Oral, Daily  atorvastatin, 40 mg, Oral, Nightly  ceFAZolin, 2 g, Intravenous, Q8H  chlorhexidine, 15 mL, Mouth/Throat, Q12H  enoxaparin, 40 mg, Subcutaneous, Q24H  mupirocin, 1 Application, Each Nare, BID  polyethylene glycol, 17 g, Oral, BID  senna-docusate sodium, 2 tablet, Oral, BID      Continuous Infusions:insulin, 0-100 Units/hr, Last Rate: 0.1 Units/hr (01/15/25 1812)  sodium chloride, 30 mL/hr, Last Rate: 30 mL/hr (01/14/25 1546)      PRN Meds:.  acetaminophen **OR** acetaminophen **OR** acetaminophen     albumin human    bisacodyl    bisacodyl    Calcium Replacement - Follow Nurse / BPA Driven Protocol    cyclobenzaprine    dextrose    dextrose    glucagon (human recombinant)    HYDROcodone-acetaminophen    Magnesium Cardiology Dose Replacement - Follow Nurse / BPA Driven Protocol    magnesium hydroxide    Morphine **AND** naloxone    nitroglycerin    ondansetron    Phosphorus Replacement - Follow Nurse / BPA Driven Protocol    Potassium Replacement - Follow Nurse / BPA Driven Protocol    ECG/EMG Results (last 24 hours)       Procedure Component Value Units Date/Time    ECG 12 Lead Other; post op [[id number]] Collected: 01/14/25 1756     Updated: 01/14/25 2047     QT Interval 449 ms      QTC Interval 496 ms     Narrative:      HEART RATE=73  bpm  RR Mnsfqdmp=178  ms  GA Efhcfuxz=040  ms  P Horizontal Axis=7  deg  P Front Axis=71  deg  QRSD Avaxrguw=868  ms  QT Gygoehyy=781  ms  RFfD=595  ms  QRS Axis=60  deg  T Wave Axis=190  deg  - ABNORMAL ECG -  Sinus rhythm  Left bundle branch block  ST elevation secondary to IVCD  When compared with ECG of 13-Jan-2025 08:00:09,  No significant change  Electronically Signed By: Makayla Driscoll ([facility]) 2025-01-14 20:44:20  Date and Time of Study:2025-01-14 17:56:05    ECG 12 Lead Other; post op [[id number]] Collected: 01/15/25 0309     Updated: 01/15/25 0309     QT Interval 447 ms      QTC Interval 484 ms     Narrative:      HEART RATE=74  bpm  RR Wholzrzy=969  ms  GA Fptirkhp=998  ms  P Horizontal Axis=  deg  P Front Axis=47  deg  QRSD Vedomaos=945  ms  QT Bhkspgen=809  ms  KXtT=327  ms  QRS Axis=38  deg  T Wave Axis=138  deg  - ABNORMAL ECG -  Sinus rhythm  Atrial premature complexes  Probable left atrial enlargement  Left bundle branch block  ST elevation secondary to IVCD  Date and Time of Study:2025-01-15 03:09:08            Imaging Results (Last 24 Hours)       Procedure Component Value Units Date/Time    XR Chest 1 View [[id number]] Collected: 01/15/25 0822     Updated:  01/15/25 0826    Narrative:      XR CHEST 1 VW    Date of Exam: 1/15/2025 6:12 AM EST    Indication: Post-Op Heart Surgery    Comparison: Chest radiograph dated 1/14/2025    Findings:  There is a right IJ introducer with Breeden-Oscar catheter tip terminating at the right main pulmonary artery. There are 2 midline chest tubes. There are postsurgical changes of recent cardiac surgery. There is mild left basilar atelectasis. There is no   significant pleural effusion. There is no pneumothorax. There are degenerative changes of the thoracic spine with an S-shaped scoliosis.      Impression:      Impression:  1.Postsurgical changes of recent cardiac surgery with mild left basilar atelectasis. No pneumothorax.  2.Support devices appear in expected position.        Electronically Signed: Loi Landers    1/15/2025 8:24 AM EST    Workstation ID: [id number]          Results for orders placed during the hospital encounter of 12/28/24    Cardiac Catheterization/Vascular Study    Conclusion  Table formatting from the original result was not included.  Cardiac Catheterization Operative Report    Rachael Rodriguez  [id number]  12/30/2024  @PCP@    She underwent cardiac catheterization.    Indications for the procedure include: Valvular heart disease with severe aortic stenosis.    Results for orders placed during the hospital encounter of 12/28/24    Adult Transthoracic Echo Complete W/ Cont if Necessary Per Protocol    Interpretation Summary    Left ventricular ejection fraction appears to be 46 - 50%.    Left ventricular diastolic function is consistent with (grade II w/high LAP) pseudonormalization.    The left atrial cavity is moderate to severely dilated.    Severe aortic valve stenosis is present.    Moderate mitral valve regurgitation is present.    Estimated right ventricular systolic pressure from tricuspid regurgitation is moderately elevated (45-55 mmHg).    Moderate to severe pulmonary hypertension is present.    No  results found for this or any previous visit.        Procedure Details:  The risks, benefits, complications, treatment options, and expected outcomes were discussed with the patient. The patient and/or family concurred with the proposed plan, giving informed consent.    After informed consent the patient was brought to the cath lab after appropriate IV hydration was begun and oral premedication was given. She was further sedated with fentanyl. She was prepped and draped in the usual manner. Using the modified Seldinger access technique, a 6 Italian sheath was placed in the femoral artery. A left heart catheterization with coronary arteriography was performed. Findings are discussed below.    7 Italian venous sheath was placed in the right femoral vein using ultrasound guidance and then we used a 7 Italian chest tube [place]-Oscar catheter to the right heart catheterization and hemodynamic pressure measurements.    After the procedure was completed, sedation was stopped and the sheaths and catheters were all removed. Hemostasis was achieved per established hospital protocols.    Conscious sedation:  Conscious sedation was performed according to protocol.  I supervised and directed an independent trained observer with the assistance of monitoring the patient's level of consciousness and physiologic status throughout the procedure.  Intraoperative service time was 90 minutes.    Findings:    Hemodynamics Central aortic pressure systolic 146 diastolic 88 with a mean pressure of 150 mmHg      Right heart catheterization with hemodynamics      Pulmonary capillary wedge pressure was 20 mmHg  PA pressure systolic 45 diastolic 28 with a mean pressure of 36 mmHg  RV pressure systolic 40 mmHg  Right atrial pressure was 8 mmHg  PA saturation was 73%  Right atrial saturation was of 69%  Aortic saturation was 96%  Silvia equation cardiac output was 4.3 L/min  Silvia equation cardiac index was 2.6 L/min/m²  Pulmonary vascular resistance was  239 dynes per second  Systemic vascular resistance was 2002 dynes per second  Left Main Large-caliber vessel angiographically normal bifurcates into left anterior descending and left circumflex arteries  RCA Large-caliber vessel angiographically normal  LAD Large-caliber vessel angiographically normal  Circ Large-caliber vessel angiographically normal  LV Not done  Coronary Dominance Right coronary artery    Estimated Blood Loss:  Minimal    Specimens: None    Complications:  None; patient tolerated the procedure well.    Disposition: PACU - hemodynamically stable.    Condition: stable    Impressions:  Normal coronaries  Moderate pulmonary hypertension    Recommendations:  Further evaluation for TAVR versus surgical AVR     Results for orders placed during the hospital encounter of 12/28/24    Adult Transthoracic Echo Complete W/ Cont if Necessary Per Protocol    Interpretation Summary    Left ventricular ejection fraction appears to be 46 - 50%.    Left ventricular diastolic function is consistent with (grade II w/high LAP) pseudonormalization.    The left atrial cavity is moderate to severely dilated.    Severe aortic valve stenosis is present.    Moderate mitral valve regurgitation is present.    Estimated right ventricular systolic pressure from tricuspid regurgitation is moderately elevated (45-55 mmHg).    Moderate to severe pulmonary hypertension is present.     Lab Results   Component Value Date    GLUCOSE 132 (H) 01/15/2025    BUN 15 01/15/2025    CREATININE 0.72 01/15/2025    EGFR 91.2 01/15/2025    BCR 20.8 01/15/2025    K 4.3 01/15/2025    CO2 22.4 01/15/2025    CALCIUM 9.8 01/15/2025    ALBUMIN 4.8 01/15/2025    BILITOT 0.4 01/13/2025    AST 25 01/13/2025    ALT 16 01/13/2025      Lab Results   Component Value Date    CHOL 254 (H) 01/13/2025    TRIG 114 01/13/2025    HDL 78 (H) 01/13/2025     (H) 01/13/2025      Lab Results   Component Value Date    TROPONINT 16 (H) 12/28/2024        Assessment &  Plan       Nonrheumatic aortic valve stenosis    Nonrheumatic aortic (valve) stenosis        Makayla Driscoll MD  01/15/25  18:17 EST

## 2025-01-15 NOTE — THERAPY EVALUATION
Patient Name: Rachael Rodriguez  : 1955    MRN: [id number]                              Today's Date: 1/15/2025       Admit Date: 2025    Visit Dx:     ICD-10-CM ICD-9-CM   1. Nonrheumatic aortic valve stenosis  I35.0 424.1     Patient Active Problem List   Diagnosis    CHF (congestive heart failure)    Multifocal pneumonia    LBBB (left bundle branch block)    Pneumonia, unspecified organism    Acute on chronic HFrEF (heart failure with reduced ejection fraction)    Nonrheumatic aortic valve stenosis    Nonrheumatic aortic (valve) stenosis     Past Medical History:   Diagnosis Date    Hypertension      Past Surgical History:   Procedure Laterality Date    CARDIAC CATHETERIZATION Right 2024    Procedure: Right and Left Heart Cath;  Surgeon: Makayla Driscoll MD;  Location: [facility] CATH INVASIVE LOCATION;  Service: Cardiovascular;  Laterality: Right;  Local and IV sedation      General Information       Row Name 01/15/25 1340          OT Time and Intention    Document Type evaluation  -ES     Mode of Treatment occupational therapy  -ES       Row Name 01/15/25 1340          General Information    Patient Profile Reviewed yes  -ES     Existing Precautions/Restrictions sternal;cardiac  -ES       Row Name 01/15/25 1340          Occupational Profile    Reason for Services/Referral (Occupational Profile) Pt is a 69 y.o. year old female s/p AVR on 25.    Pmhx significant for L BBB, PNA.    At baseline, pt resides with spouse, father-in-law, and adult grandson in home. Home is multi-level with elevator to basement, and 5 steps to enter. Pt is able to stay at main level, and her bathroom has a walk-in shower.  -ES       Row Name 01/15/25 1340          Living Environment    People in Home spouse;grandparent(s);parent(s)  -ES       Row Name 01/15/25 1340          Cognition    Orientation Status (Cognition) oriented x 4  -ES       Row Name 01/15/25 1340          Safety Issues/Impairments Affecting  Functional Mobility    Safety Issues Affecting Function (Mobility) safety precautions follow-through/compliance;sequencing abilities  -ES     Impairments Affecting Function (Mobility) balance;pain;strength;endurance/activity tolerance  -ES               User Key  (r) = Recorded By, (t) = Taken By, (c) = Cosigned By      Initials Name Provider Type    Kayleigh Collins OT Occupational Therapist                     Mobility/ADL's       Row Name 01/15/25 1731          Bed Mobility    Bed Mobility bed mobility (all) activities  -ES     All Activities, [place] (Bed Mobility) not tested  -ES     Comment, (Bed Mobility) Up in chair upon OT arrival  -ES       Row Name 01/15/25 1731          Sit-Stand Transfer    Sit-Stand [place] (Transfers) minimum assist (75% patient effort);1 person assist;1 person to manage equipment  -ES     Assistive Device (Sit-Stand Transfers) walker, front-wheeled  -ES       Row Name 01/15/25 1731          Activities of Daily Living    BADL Assessment/Intervention upper body dressing;lower body dressing;toileting  -ES       Row Name 01/15/25 1731          Mobility    Left Upper Extremity (Weight-bearing Status) --  Sternal precautions  -ES       Row Name 01/15/25 1731          Upper Body Dressing Assessment/Training    [place] Level (Upper Body Dressing) moderate assist (50% patient effort)  -ES       Row Name 01/15/25 1731          Lower Body Dressing Assessment/Training    [place] Level (Lower Body Dressing) maximum assist (25% patient effort)  -ES       Row Name 01/15/25 1731          Toileting Assessment/Training    [place] Level (Toileting) moderate assist (50% patient effort)  -ES               User Key  (r) = Recorded By, (t) = Taken By, (c) = Cosigned By      Initials Name Provider Type    Kayleigh Collins OT Occupational Therapist                   Obj/Interventions       Row Name 01/15/25 1732          Sensory Assessment (Somatosensory)    Sensory  Assessment (Somatosensory) sensation intact  -ES       Row Name 01/15/25 1732          Vision Assessment/Intervention    Visual Impairment/Limitations corrective lenses for reading  -ES       Row Name 01/15/25 1732          Strength Comprehensive (MMT)    Comment, General Manual Muscle Testing (MMT) Assessment Grossly WFL  -ES       Row Name 01/15/25 1732          Balance    Balance Assessment sitting static balance;sitting dynamic balance;standing static balance;standing dynamic balance  -ES     Static Sitting Balance modified independence  -ES     Dynamic Sitting Balance supervision  -ES     Static Standing Balance contact guard  -ES     Dynamic Standing Balance minimal assist  -ES     Balance Interventions sitting;standing;static;dynamic  -ES               User Key  (r) = Recorded By, (t) = Taken By, (c) = Cosigned By      Initials Name Provider Type    ES Kayleigh Miguel, OT Occupational Therapist                   Goals/Plan       Row Name 01/15/25 1738          Bathing Goal 1 (OT)    Activity/Device (Bathing Goal 1, OT) bathing skills, all  -ES     Early Level/Cues Needed (Bathing Goal 1, OT) modified independence  -ES     Time Frame (Bathing Goal 1, OT) 2 weeks  -ES       Row Name 01/15/25 1738          Dressing Goal 1 (OT)    Activity/Device (Dressing Goal 1, OT) dressing skills, all  -ES     Early/Cues Needed (Dressing Goal 1, OT) supervision required  -ES     Time Frame (Dressing Goal 1, OT) 2 weeks  -ES       Row Name 01/15/25 1738          Toileting Goal 1 (OT)    Activity/Device (Toileting Goal 1, OT) toileting skills, all  -ES     Early Level/Cues Needed (Toileting Goal 1, OT) modified independence  -ES     Time Frame (Toileting Goal 1, OT) 2 weeks  -ES       Row Name 01/15/25 1738          Therapy Assessment/Plan (OT)    Planned Therapy Interventions (OT) activity tolerance training;BADL retraining;neuromuscular control/coordination retraining;patient/caregiver  education/training;ROM/therapeutic exercise;strengthening exercise;transfer/mobility retraining;IADL retraining;functional balance retraining;occupation/activity based interventions  -ES               User Key  (r) = Recorded By, (t) = Taken By, (c) = Cosigned By      Initials Name Provider Type    Kayleigh Collins OT Occupational Therapist                   Clinical Impression       Row Name 01/15/25 1735          Pain Assessment    Pretreatment Pain Rating 3/10  -ES     Posttreatment Pain Rating 3/10  -ES     Pain Location chest  -ES       Row Name 01/15/25 1735          Plan of Care Review    Plan of Care Reviewed With patient  -ES     Outcome Evaluation Pt is a 69 y.o. year old female s/p AVR on 1/14/25.    Pmhx significant for L BBB, PNA.    At baseline, pt resides with spouse, father-in-law, and adult grandson in home. Home is multi-level with elevator to basement, and 5 steps to enter. Pt is able to stay at main level, and her bathroom has a walk-in shower. Oriented x4. Edu on sternal precautions. Patient recalls 1/3 sternal precautions, requiring min verbal cuing for precautions. Pt requires Min a for transfers and Min verbal cuing for sternal precautions. Pt ambulates with Min A. Mod A for UB Adls. Max A for LB Adls. OT provided and reviewed Cardiac Rehab HEP. Pt completes 1 set x 10 reps of each exercise with fair understanding. Encouraged to complete 3x/daily to facilitate increased activity tolerance. Pt will require additional education to ensure carryover. Anticipate patient will d/c home with family when medically stable.  -ES       Row Name 01/15/25 1735          Therapy Assessment/Plan (OT)    Rehab Potential (OT) good  -ES     Criteria for Skilled Therapeutic Interventions Met (OT) yes;skilled treatment is necessary  -ES     Therapy Frequency (OT) 5 times/wk  -ES     Predicted Duration of Therapy Intervention (OT) until dc  -ES       Row Name 01/15/25 1735          Therapy Plan  Review/Discharge Plan (OT)    Anticipated Discharge Disposition (OT) home with assist  -ES       Row Name 01/15/25 1401 01/15/25 1350       Vital Signs    Pre Systolic BP Rehab -- 105  -ES    Pre Treatment Diastolic BP -- 52  (65)  -ES    Post Systolic BP Rehab 113  -ES --    Post Treatment Diastolic BP 58  (71)  -ES --    Pretreatment Heart Rate (beats/min) -- 81  -ES    Pretreatment Resp Rate (breaths/min) -- 19  -ES    Posttreatment Resp Rate (breaths/min) 17  -ES --    Pre SpO2 (%) -- 94  -ES    O2 Delivery Pre Treatment room air  -ES --    Post SpO2 (%) 95  -ES --    O2 Delivery Post Treatment room air  -ES --    Pre Patient Position -- Sitting  -ES      Row Name 01/15/25 1735          Positioning and Restraints    Pre-Treatment Position sitting in chair/recliner  -ES     Post Treatment Position chair  -ES     In Chair notified nsg;call light within reach;encouraged to call for assist;exit alarm on;with family/caregiver  -ES               User Key  (r) = Recorded By, (t) = Taken By, (c) = Cosigned By      Initials Name Provider Type    Kayleigh Collins, OT Occupational Therapist                   Outcome Measures       Row Name 01/15/25 1741          How much help from another is currently needed...    Putting on and taking off regular lower body clothing? 2  -ES     Bathing (including washing, rinsing, and drying) 2  -ES     Toileting (which includes using toilet bed pan or urinal) 2  -ES     Putting on and taking off regular upper body clothing 3  -ES     Taking care of personal grooming (such as brushing teeth) 3  -ES     Eating meals 3  -ES     AM-PAC 6 Clicks Score (OT) 15  -ES       Row Name 01/15/25 1502 01/15/25 0800       How much help from another person do you currently need...    Turning from your back to your side while in flat bed without using bedrails? 3  -CM 3  -IM    Moving from lying on back to sitting on the side of a flat bed without bedrails? 2  -CM 3  -IM    Moving to and from a bed  to a chair (including a wheelchair)? 3  -CM 3  -IM    Standing up from a chair using your arms (e.g., wheelchair, bedside chair)? 3  -CM 4  -IM    Climbing 3-5 steps with a railing? 2  -CM 2  -IM    To walk in hospital room? 3  -CM 3  -IM    AM-PAC 6 Clicks Score (PT) 16  -CM 18  -IM    Highest Level of Mobility Goal 5 --> Static standing  -CM 6 --> Walk 10 steps or more  -IM      Row Name 01/15/25 1741 01/15/25 1502       Functional Assessment    Outcome Measure Options AM-PAC 6 Clicks Daily Activity (OT)  -ES AM-PAC 6 Clicks Basic Mobility (PT)  -CM              User Key  (r) = Recorded By, (t) = Taken By, (c) = Cosigned By      Initials Name Provider Type    Kayleigh Collins, OT Occupational Therapist    CM Kiya Lea, PT Physical Therapist    IM Wu Hanna, RN Registered Nurse                    Occupational Therapy Education        No education to display                  OT Recommendation and Plan  Planned Therapy Interventions (OT): activity tolerance training, BADL retraining, neuromuscular control/coordination retraining, patient/caregiver education/training, ROM/therapeutic exercise, strengthening exercise, transfer/mobility retraining, IADL retraining, functional balance retraining, occupation/activity based interventions  Therapy Frequency (OT): 5 times/wk  Plan of Care Review  Plan of Care Reviewed With: patient  Outcome Evaluation: Pt is a 69 y.o. year old female s/p AVR on 1/14/25.    Pmhx significant for L BBB, PNA.    At baseline, pt resides with spouse, father-in-law, and adult grandson in home. Home is multi-level with elevator to basement, and 5 steps to enter. Pt is able to stay at main level, and her bathroom has a walk-in shower. Oriented x4. Edu on sternal precautions. Patient recalls 1/3 sternal precautions, requiring min verbal cuing for precautions. Pt requires Min a for transfers and Min verbal cuing for sternal precautions. Pt ambulates with Min A. Mod A for UB Adls. Max A  for LB Adls. OT provided and reviewed Cardiac Rehab HEP. Pt completes 1 set x 10 reps of each exercise with fair understanding. Encouraged to complete 3x/daily to facilitate increased activity tolerance. Pt will require additional education to ensure carryover. Anticipate patient will d/c home with family when medically stable.     Time Calculation:         Time Calculation- OT       Row Name 01/15/25 1741             Time Calculation- OT    OT Start Time 1340  -ES      OT Stop Time 1406  -ES      OT Time Calculation (min) 26 min  -ES      Total Timed Code Minutes- OT 0 minute(s)  -ES      OT Received On 01/15/25  -ES      OT - Next Appointment 01/16/25  -ES      OT Goal Re-Cert Due Date 01/29/25  -ES                User Key  (r) = Recorded By, (t) = Taken By, (c) = Cosigned By      Initials Name Provider Type    Kayleigh Collins OT Occupational Therapist                           Kayleigh Miguel OT  1/15/2025

## 2025-01-15 NOTE — CONSULTS
Referring Provider: BERNARDO Polanco  Reason for Consultation: Severe aortic stenosis    Chief complaint shortness of breath    Cardiology assessment and plan      Severe aortic stenosis status post arctic valve replacement surgery with [employer] pericardial prosthesis 27 mm  Atrial appendage closure with a atrial clip  Pulmonary hypertension  Diastolic heart failure with LV ejection fraction of 45 to 50%  Hypertension  Hyperlipidemia  Normal thyroid function  Moderate to severe pulmonary hypertension  Mild mitral regurgitation  Cardiac catheterization with normal coronaries and moderate pulmonary hypertension  Patient is currently being paced and underlying rhythm is sinus rhythm with a left bundle branch block  Tmax is 98.4 pulse is 77 respirations are 12 blood pressure is 124/60 sats are 100%  pH is 7.38 pCO2 is 39 pO2 is 137 sodium is 147 potassium is 3.7 creatinine is 0.7  Continue postsurgical care  Discussed with nursing staff  Continue current medical management  Further recommendation based on patient course            History of present illness:  Rachael Rodriguez is a 69 y.o. female who presents with recent hospitalization for heart failure was diagnosed with severe aortic stenosis contributing to decreased LV function and also pulmonary hypertension and also congestive heart failure was brought in electively for aortic valve replacement surgery       Review of Systems  Review of Systems   Unable to perform ROS: Intubated       Past Medical History  Past Medical History:   Diagnosis Date    Hypertension     and   Past Surgical History:   Procedure Laterality Date    CARDIAC CATHETERIZATION Right 12/30/2024    Procedure: Right and Left Heart Cath;  Surgeon: Makayla Driscoll MD;  Location: [facility] CATH INVASIVE LOCATION;  Service: Cardiovascular;  Laterality: Right;  Local and IV sedation       Family History  History reviewed. No pertinent family history.    Social History  Social History  "    Socioeconomic History    Marital status:    Tobacco Use    Smoking status: Never     Passive exposure: Never    Smokeless tobacco: Never   Vaping Use    Vaping status: Never Used   Substance and Sexual Activity    Alcohol use: Never    Drug use: Never    Sexual activity: Defer       Objective     Physical Exam:  Constitutional:       Appearance: Healthy appearance. Well-developed.      Interventions: Sedated and intubated.   Eyes:      Conjunctiva/sclera: Conjunctivae normal.      Pupils: Pupils are equal, round, and reactive to light.   HENT:      Head: Normocephalic and atraumatic.   Neck:      Thyroid: No thyromegaly.   Pulmonary:      Effort: Increased respiratory effort. Intubated.      Breath sounds: Normal breath sounds. Examination of the right-lower field reveals decreased breath sounds. Examination of the left-lower field reveals decreased breath sounds.   Cardiovascular:      Normal rate. Regular rhythm.   Pulses:     Intact distal pulses.   Edema:     Peripheral edema absent.   Abdominal:      General: Bowel sounds are normal.      Palpations: Abdomen is soft.   Musculoskeletal:      Cervical back: Normal range of motion and neck supple. Skin:     General: Skin is warm.   Neurological:      Mental Status: Unresponsive.         Vital Signs  Vitals:    01/14/25 1845 01/14/25 1900 01/14/25 1915 01/14/25 1935   BP:  122/65  124/61   BP Location:       Patient Position:       Pulse: 74 75 83 77   Resp:       Temp: 96.4 °F (35.8 °C) 96.4 °F (35.8 °C) 96.4 °F (35.8 °C)    TempSrc:       SpO2: 100% 100% 100%    Weight:       Height:           Weight  Flowsheet Rows      Flowsheet Row First Filed Value   Admission Height 144.8 cm (57\") Documented at 01/14/2025 0646   Admission Weight 65.7 kg (144 lb 12.8 oz) Documented at 01/14/2025 0646                Results Review:  Lab Results (last 24 hours)       Procedure Component Value Units Date/Time    POC Glucose Once [[id number]]  (Abnormal) Collected: " 01/14/25 1933    Specimen: Blood Updated: 01/14/25 1934     Glucose 152 mg/dL      Comment: Serial Number: [id number]:  [id number]       Blood Gas, Arterial - [[id number]]  (Abnormal) Collected: 01/14/25 1825    Specimen: Arterial Blood Updated: 01/14/25 1829     Site Arterial Line     Denver's Test N/A     pH, Arterial 7.386 pH units      pCO2, Arterial 39.8 mm Hg      pO2, Arterial 137.7 mm Hg      HCO3, Arterial 23.8 mmol/L      Base Excess, Arterial -1.1 mmol/L      Comment: Serial Number: [id number]:  [id number]        O2 Saturation, Arterial 99.1 %      CO2 Content 25.1 mmol/L      Barometric Pressure for Blood Gas --     Comment: N/A        Modality Adult Vent     FIO2 40 %      Ventilator Mode AC     Set Tidal Volume 550     PEEP 5     Hemodilution No     Respiratory Rate 12     PO2/FIO2 344    POC Glucose Once [[id number]]  (Abnormal) Collected: 01/14/25 1825    Specimen: Arterial Blood Updated: 01/14/25 1829     Glucose 195 mg/dL      Comment: Serial Number: [id number]:  [id number]       Blood Gas, Mixed [[id number]]  (Abnormal) Collected: 01/14/25 1724    Specimen: Blood Updated: 01/14/25 1739     pH, mixed 7.38 pH units      PCO2 MIXED 38.5 mmHg      PO2 MIXED 41.3 mmHg      HCO3 MIXED 22.9 mmol/L      CO2 Content 24.1 mmol/L      Base Excess, Arterial -2.0 mmol/L      Comment: Serial Number: [id number]:  [id number]        O2 SATURATION MIXED 75.9 %      Hemodilution No     Site Swanz Oscar     Denver's Test N/A     Modality Adult Vent     FIO2 40 %      Set Mech Resp Rate 12     PEEP 5     Ventilator Mode AC     Notified Who --     Notified Time --     Read Back --    Manual Differential [[id number]]  (Abnormal) Collected: 01/14/25 1628    Specimen: Blood Updated: 01/14/25 1722     Neutrophil % 85.0 %      Lymphocyte % 8.0 %      Monocyte % 5.0 %      Eosinophil % 1.0 %      Bands %  1.0 %      Neutrophils Absolute 15.82 10*3/mm3      Lymphocytes Absolute 1.47 10*3/mm3      Monocytes Absolute 0.92 10*3/mm3       Eosinophils Absolute 0.18 10*3/mm3      RBC Morphology Normal     Toxic Granulation Mod/2+     Large Platelets Slight/1+    CBC & Differential [[id number]]  (Abnormal) Collected: 01/14/25 1628    Specimen: Blood Updated: 01/14/25 1722    Narrative:      The following orders were created for panel order CBC & Differential.  Procedure                               Abnormality         Status                     ---------                               -----------         ------                     CBC Auto Differential[[id number]]        Abnormal            Final result               Scan Slide[[id number]]                                       Final result                 Please view results for these tests on the individual orders.    CBC Auto Differential [[id number]]  (Abnormal) Collected: 01/14/25 1628    Specimen: Blood Updated: 01/14/25 1722     WBC 18.39 10*3/mm3      RBC 4.62 10*6/mm3      Hemoglobin 12.6 g/dL      Hematocrit 38.1 %      MCV 82.5 fL      MCH 27.3 pg      MCHC 33.1 g/dL      RDW 13.5 %      RDW-SD 40.8 fl      MPV 10.5 fL      Platelets 136 10*3/mm3     Scan Slide [[id number]] Collected: 01/14/25 1628    Specimen: Blood Updated: 01/14/25 1722     Scan Slide --     Comment: See Manual Differential Results       Blood Gas, Arterial - [[id number]]  (Abnormal) Collected: 01/14/25 1718    Specimen: Arterial Blood Updated: 01/14/25 1722     Site Arterial Line     Denver's Test N/A     pH, Arterial 7.401 pH units      pCO2, Arterial 36.4 mm Hg      pO2, Arterial 134.3 mm Hg      HCO3, Arterial 22.6 mmol/L      Base Excess, Arterial -1.8 mmol/L      Comment: Serial Number: [id number]:  [id number]        O2 Saturation, Arterial 99.1 %      CO2 Content 23.7 mmol/L      Barometric Pressure for Blood Gas --     Comment: N/A        Modality Adult Vent     FIO2 40 %      Ventilator Mode AC     Set Tidal Volume 550     PEEP 8     Hemodilution No     Respiratory Rate 12     PO2/FIO2 336    POCT Electrolytes +HGB  +HCT [[id number]]  (Abnormal) Collected: 01/14/25 1718    Specimen: Arterial Blood Updated: 01/14/25 1722     Sodium 147 mmol/L      POC Potassium 3.7 mmol/L      Ionized Calcium 1.33 mmol/L      Comment: Serial Number: [id number]:  [id number]        Glucose 186 mg/dL      Hematocrit 33 %      Hemoglobin 11.3 g/dL     POC Glucose Once [[id number]]  (Abnormal) Collected: 01/14/25 1718    Specimen: Arterial Blood Updated: 01/14/25 1722     Glucose 186 mg/dL      Comment: Serial Number: [id number]:  [id number]       Renal Function Panel [[id number]]  (Abnormal) Collected: 01/14/25 1628    Specimen: Blood Updated: 01/14/25 1701     Glucose 132 mg/dL      BUN 11 mg/dL      Creatinine 0.72 mg/dL      Sodium 147 mmol/L      Potassium 3.7 mmol/L      Chloride 113 mmol/L      CO2 23.7 mmol/L      Calcium 10.0 mg/dL      Albumin 4.6 g/dL      Phosphorus 1.9 mg/dL      Anion Gap 10.3 mmol/L      BUN/Creatinine Ratio 15.3     eGFR 91.2 mL/min/1.73     Narrative:      GFR Categories in Chronic Kidney Disease (CKD)      GFR Category          GFR (mL/min/1.73)    Interpretation  G1                     90 or greater         Normal or high (1)  G2                      60-89                Mild decrease (1)  G3a                   45-59                Mild to moderate decrease  G3b                   30-44                Moderate to severe decrease  G4                    15-29                Severe decrease  G5                    14 or less           Kidney failure          (1)In the absence of evidence of kidney disease, neither GFR category G1 or G2 fulfill the criteria for CKD.    eGFR calculation 2021 CKD-EPI creatinine equation, which does not include race as a factor    Protime-INR [[id number]]  (Abnormal) Collected: 01/14/25 1628    Specimen: Blood Updated: 01/14/25 1655     Protime 18.5 Seconds      INR 1.53    aPTT [[id number]]  (Normal) Collected: 01/14/25 1628    Specimen: Blood Updated: 01/14/25 1655     PTT 30.4 seconds      Calcium, Ionized [[id number]]  (Normal) Collected: 01/14/25 1628    Specimen: Blood Updated: 01/14/25 1638     Ionized Calcium 1.26 mmol/L     POCT Electrolytes +HGB +HCT [[id number]]  (Abnormal) Collected: 01/14/25 1632    Specimen: Arterial Blood Updated: 01/14/25 1636     Sodium 148 mmol/L      POC Potassium 3.7 mmol/L      Ionized Calcium 1.37 mmol/L      Comment: Serial Number: [id number]:  [id number]        Glucose 138 mg/dL      Hematocrit 36 %      Hemoglobin 12.2 g/dL     POC Glucose Once [[id number]]  (Abnormal) Collected: 01/14/25 1632    Specimen: Arterial Blood Updated: 01/14/25 1636     Glucose 138 mg/dL      Comment: Serial Number: [id number]:  [id number]       Blood Gas, Arterial - [[id number]]  (Abnormal) Collected: 01/14/25 1632    Specimen: Arterial Blood Updated: 01/14/25 1636     Site Arterial Line     Denver's Test N/A     pH, Arterial 7.416 pH units      pCO2, Arterial 38.7 mm Hg      pO2, Arterial 210.6 mm Hg      HCO3, Arterial 24.9 mmol/L      Base Excess, Arterial 0.4 mmol/L      Comment: Serial Number: [id number]:  [id number]        O2 Saturation, Arterial 99.8 %      CO2 Content 26.1 mmol/L      Barometric Pressure for Blood Gas --     Comment: N/A        Modality Adult Vent     FIO2 70 %      Ventilator Mode AC     Set Tidal Volume 550     PEEP 8     Hemodilution No     Respiratory Rate 12     PO2/FIO2 301    Protime-INR [[id number]]  (Abnormal) Collected: 01/14/25 1450    Specimen: Blood, Arterial Line Updated: 01/14/25 1509     Protime 21.1 Seconds      INR 1.81    Fibrinogen [[id number]]  (Abnormal) Collected: 01/14/25 1450    Specimen: Blood, Arterial Line Updated: 01/14/25 1509     Fibrinogen 213 mg/dL     aPTT [[id number]]  (Normal) Collected: 01/14/25 1450    Specimen: Blood, Arterial Line Updated: 01/14/25 1509     PTT 32.6 seconds     Platelet Function ADP [[id number]]  (Normal) Collected: 01/14/25 1450    Specimen: Blood, Arterial Line Updated: 01/14/25 1502     ADP Aggregation, %  Platelet 94 %     CBC (No Diff) [[id number]]  (Abnormal) Collected: 01/14/25 1450    Specimen: Blood, Arterial Line Updated: 01/14/25 1502     WBC 10.87 10*3/mm3      RBC 2.99 10*6/mm3      Hemoglobin 8.4 g/dL      Hematocrit 25.1 %      MCV 83.9 fL      MCH 28.1 pg      MCHC 33.5 g/dL      RDW 13.5 %      RDW-SD 41.5 fl      MPV 10.5 fL      Platelets 130 10*3/mm3     Tissue Pathology Exam [[id number]] Collected: 01/14/25 1337    Specimen: Tissue from Aortic valve Updated: 01/14/25 1459    POC Activated Clotting Time [[id number]]  (Normal) Collected: 01/14/25 1440    Specimen: Blood Updated: 01/14/25 1445     Activated Clotting Time  106 Seconds      Comment: Serial Number: [id number]:  [id number]       POC Activated Clotting Time [[id number]]  (Abnormal) Collected: 01/14/25 1416    Specimen: Arterial Blood Updated: 01/14/25 1445     Activated Clotting Time  475 Seconds      Comment: Serial Number: [id number]:  [id number]       POC Activated Clotting Time [[id number]]  (Abnormal) Collected: 01/14/25 1400    Specimen: Arterial Blood Updated: 01/14/25 1444     Activated Clotting Time  487 Seconds      Comment: Serial Number: [id number]:  [id number]       POC Activated Clotting Time [[id number]]  (Abnormal) Collected: 01/14/25 1350    Specimen: Arterial Blood Updated: 01/14/25 1444     Activated Clotting Time  360 Seconds      Comment: Serial Number: [id number]:  [id number]       POC Activated Clotting Time [[id number]]  (Abnormal) Collected: 01/14/25 1334    Specimen: Arterial Blood Updated: 01/14/25 1444     Activated Clotting Time  400 Seconds      Comment: Serial Number: [id number]:  [id number]       POC Activated Clotting Time [[id number]]  (Normal) Collected: 01/14/25 1301    Specimen: Arterial Blood Updated: 01/14/25 1444     Activated Clotting Time  124 Seconds      Comment: Serial Number: [id number]:  [id number]       POC Chem 8, arterial (ISTAT) [[id number]]  (Abnormal) Collected: 01/14/25 1439    Specimen: Arterial Blood  Updated: 01/14/25 1443     Ionized Calcium 1.52 mmol/L      pH, Arterial 7.390 pH units      pCO2, Arterial 37.5 mm Hg      pO2, Arterial 368.0 mm Hg      HCO3, Arterial 22.8 mmol/L      Base Excess, Arterial <0.0 mmol/L      Base Deficit --     O2 Saturation, Arterial 100.0 %      Glucose 128 mg/dL      Comment: Serial Number: [id number]:  [id number]        Sodium 135 mmol/L      POC Potassium 3.9 mmol/L      Hematocrit 24 %      Hemoglobin 8.2 g/dL      CO2 Content 24 mmol/L     POC Chem 8, arterial (ISTAT) [[id number]]  (Abnormal) Collected: 01/14/25 1412    Specimen: Arterial Blood Updated: 01/14/25 1443     Ionized Calcium 1.19 mmol/L      pH, Arterial 7.460 pH units      pCO2, Arterial 39.2 mm Hg      pO2, Arterial 417.0 mm Hg      HCO3, Arterial 27.7 mmol/L      Base Excess, Arterial 4.0 mmol/L      Base Deficit --     O2 Saturation, Arterial 100.0 %      Glucose 157 mg/dL      Comment: Serial Number: [id number]:  [id number]        Sodium 142 mmol/L      POC Potassium 4.3 mmol/L      Hematocrit 29 %      Hemoglobin 9.9 g/dL      CO2 Content 29 mmol/L     POC Chem 8, arterial (ISTAT) [[id number]]  (Abnormal) Collected: 01/14/25 1356    Specimen: Arterial Blood Updated: 01/14/25 1443     Ionized Calcium 0.97 mmol/L      pH, Arterial 7.430 pH units      pCO2, Arterial 47.8 mm Hg      pO2, Arterial 49.0 mm Hg      HCO3, Arterial 31.8 mmol/L      Base Excess, Arterial 8.0 mmol/L      Base Deficit --     O2 Saturation, Arterial 85.0 %      Glucose 128 mg/dL      Comment: Serial Number: [id number]:  [id number]        Sodium 146 mmol/L      POC Potassium 3.9 mmol/L      Hematocrit 26 %      Hemoglobin 8.8 g/dL      CO2 Content 33 mmol/L     POC Chem 8, arterial (ISTAT) [[id number]]  (Abnormal) Collected: 01/14/25 1300    Specimen: Arterial Blood Updated: 01/14/25 1443     Ionized Calcium 1.29 mmol/L      pH, Arterial 7.360 pH units      pCO2, Arterial 38.9 mm Hg      pO2, Arterial 326.0 mm Hg      HCO3, Arterial 21.7  mmol/L      Base Excess, Arterial <0.0 mmol/L      Base Deficit --     O2 Saturation, Arterial 100.0 %      Glucose 104 mg/dL      Comment: Serial Number: [id number]:  [id number]        Sodium 142 mmol/L      POC Potassium 3.4 mmol/L      Hematocrit 37 %      Hemoglobin 12.6 g/dL      CO2 Content 23 mmol/L     POC Glucose Once [[id number]]  (Normal) Collected: 01/14/25 1034    Specimen: Blood Updated: 01/14/25 1035     Glucose 102 mg/dL      Comment: Serial Number: [id number]:  [id number]             Imaging Results (Last 72 Hours)       Procedure Component Value Units Date/Time    XR Chest 1 View [[id number]] Collected: 01/14/25 1606     Updated: 01/14/25 1610    Narrative:      XR CHEST 1 VW    Date of Exam: 1/14/2025 3:45 PM EST    Indication: Post-Op Check Line & Tube Placement    Comparison: 1/13/2025.    Findings:  There are interval postoperative changes of CABG and left atrial ligation. Endotracheal tube, nasoenteric tube, mediastinal drains appear in good position. Heart size is normal. The lungs are clear. There is no pneumothorax. Tip of the [place]-Oscar catheter   is in the right main pulmonary artery.      Impression:      Impression:  Expected postoperative appearance status post CABG and left atrial ligation.        Electronically Signed: Edwin Gold MD    1/14/2025 4:08 PM EST    Workstation ID: [id number]            Results for orders placed during the hospital encounter of 12/28/24    Adult Transesophageal Echo ([facility]JAMIN) W/ Cont if Necessary Per Protocol    Interpretation Summary    Left ventricular ejection fraction appears to be 46 - 50%.    Aortic valve is heavily  calcified. There is severe aortic valve stenosis present.    Mitral valve is grossly normal, there is mild to moderate regurgitation.    Saline test results are negative.    Electronically signed by Keven Jasso MD, 12/31/24, 11:50 AM EST.      Medication Review  Scheduled Meds:acetaminophen, 1,000 mg,  Intravenous, Q8H  [START ON 1/15/2025] aspirin, 81 mg, Oral, Daily  aspirin, 325 mg, Oral, Once  [START ON 1/15/2025] atorvastatin, 40 mg, Oral, Nightly  ceFAZolin, 2 g, Intravenous, Q8H  chlorhexidine, 15 mL, Mouth/Throat, Q12H  [START ON 1/15/2025] enoxaparin, 40 mg, Subcutaneous, Q24H  magnesium sulfate, 1 g, Intravenous, Q8H  mupirocin, 1 Application, Each Nare, BID  [START ON 1/15/2025] polyethylene glycol, 17 g, Oral, BID  potassium phosphate, 15 mmol, Intravenous, Once  senna-docusate sodium, 2 tablet, Oral, BID      Continuous Infusions:dexmedetomidine, 0.2-1.5 mcg/kg/hr, Last Rate: 0.2 mcg/kg/hr (01/14/25 1936)  insulin, 0-100 Units/hr, Last Rate: 1.8 Units/hr (01/14/25 1935)  niCARdipine, 5-15 mg/hr, Last Rate: 2.5 mg/hr (01/14/25 1935)  norepinephrine, 0.02-0.06 mcg/kg/min  sodium chloride, 30 mL/hr, Last Rate: 30 mL/hr (01/14/25 1546)      PRN Meds:.  [START ON 1/15/2025] acetaminophen **OR** [START ON 1/15/2025] acetaminophen **OR** [START ON 1/15/2025] acetaminophen    bisacodyl    [START ON 1/15/2025] bisacodyl    Calcium Replacement - Follow Nurse / BPA Driven Protocol    [START ON 1/15/2025] cyclobenzaprine    dextrose    dextrose    glucagon (human recombinant)    HYDROcodone-acetaminophen    Magnesium Cardiology Dose Replacement - Follow Nurse / BPA Driven Protocol    [START ON 1/15/2025] magnesium hydroxide    meperidine    Morphine **AND** naloxone    niCARdipine    nitroglycerin    norepinephrine    ondansetron    Phosphorus Replacement - Follow Nurse / BPA Driven Protocol    Potassium Replacement - Follow Nurse / BPA Driven Protocol    Lab Results   Component Value Date    GLUCOSE 132 (H) 01/14/2025    BUN 11 01/14/2025    CREATININE 0.72 01/14/2025    EGFR 91.2 01/14/2025    BCR 15.3 01/14/2025    K 3.7 01/14/2025    CO2 23.7 01/14/2025    CALCIUM 10.0 01/14/2025    ALBUMIN 4.6 01/14/2025    BILITOT 0.4 01/13/2025    AST 25 01/13/2025    ALT 16 01/13/2025     Results for orders placed during  the hospital encounter of 12/28/24    Cardiac Catheterization/Vascular Study    Conclusion  Table formatting from the original result was not included.  Cardiac Catheterization Operative Report    Rachael Rodriguez  [id number]  12/30/2024  @PCP@    She underwent cardiac catheterization.    Indications for the procedure include: Valvular heart disease with severe aortic stenosis.    Results for orders placed during the hospital encounter of 12/28/24    Adult Transthoracic Echo Complete W/ Cont if Necessary Per Protocol    Interpretation Summary    Left ventricular ejection fraction appears to be 46 - 50%.    Left ventricular diastolic function is consistent with (grade II w/high LAP) pseudonormalization.    The left atrial cavity is moderate to severely dilated.    Severe aortic valve stenosis is present.    Moderate mitral valve regurgitation is present.    Estimated right ventricular systolic pressure from tricuspid regurgitation is moderately elevated (45-55 mmHg).    Moderate to severe pulmonary hypertension is present.    No results found for this or any previous visit.        Procedure Details:  The risks, benefits, complications, treatment options, and expected outcomes were discussed with the patient. The patient and/or family concurred with the proposed plan, giving informed consent.    After informed consent the patient was brought to the cath lab after appropriate IV hydration was begun and oral premedication was given. She was further sedated with fentanyl. She was prepped and draped in the usual manner. Using the modified Seldinger access technique, a 6 Urdu sheath was placed in the femoral artery. A left heart catheterization with coronary arteriography was performed. Findings are discussed below.    7 Urdu venous sheath was placed in the right femoral vein using ultrasound guidance and then we used a 7 Urdu chest tube [place]-Oscar catheter to the right heart catheterization and hemodynamic pressure  measurements.    After the procedure was completed, sedation was stopped and the sheaths and catheters were all removed. Hemostasis was achieved per established hospital protocols.    Conscious sedation:  Conscious sedation was performed according to protocol.  I supervised and directed an independent trained observer with the assistance of monitoring the patient's level of consciousness and physiologic status throughout the procedure.  Intraoperative service time was 90 minutes.    Findings:    Hemodynamics Central aortic pressure systolic 146 diastolic 88 with a mean pressure of 150 mmHg      Right heart catheterization with hemodynamics      Pulmonary capillary wedge pressure was 20 mmHg  PA pressure systolic 45 diastolic 28 with a mean pressure of 36 mmHg  RV pressure systolic 40 mmHg  Right atrial pressure was 8 mmHg  PA saturation was 73%  Right atrial saturation was of 69%  Aortic saturation was 96%  Silvia equation cardiac output was 4.3 L/min  Silvia equation cardiac index was 2.6 L/min/m²  Pulmonary vascular resistance was 239 dynes per second  Systemic vascular resistance was 2002 dynes per second  Left Main Large-caliber vessel angiographically normal bifurcates into left anterior descending and left circumflex arteries  RCA Large-caliber vessel angiographically normal  LAD Large-caliber vessel angiographically normal  Circ Large-caliber vessel angiographically normal  LV Not done  Coronary Dominance Right coronary artery    Estimated Blood Loss:  Minimal    Specimens: None    Complications:  None; patient tolerated the procedure well.    Disposition: PACU - hemodynamically stable.    Condition: stable    Impressions:  Normal coronaries  Moderate pulmonary hypertension    Recommendations:  Further evaluation for TAVR versus surgical AVR     Results for orders placed during the hospital encounter of 12/28/24    Adult Transesophageal Echo ([facility]IN) W/ Cont if Necessary Per Protocol    Interpretation Summary     Left ventricular ejection fraction appears to be 46 - 50%.    Aortic valve is heavily  calcified. There is severe aortic valve stenosis present.    Mitral valve is grossly normal, there is mild to moderate regurgitation.    Saline test results are negative.    Electronically signed by Keven Jasso MD, 12/31/24, 11:50 AM EST.     Lab Results   Component Value Date    CHOL 254 (H) 01/13/2025    TRIG 114 01/13/2025    HDL 78 (H) 01/13/2025     (H) 01/13/2025            Assessment & Plan       Nonrheumatic aortic valve stenosis    Nonrheumatic aortic (valve) stenosis          Makayla Driscoll MD  01/14/25  19:38 EST

## 2025-01-15 NOTE — ANESTHESIA POSTPROCEDURE EVALUATION
Patient: Rachael Rodriguez    Procedure Summary       Date: 01/14/25 Room / Location: [facility] CVOR 01 / [facility]    Anesthesia Start: 1219 Anesthesia Stop: 1536    Procedures:       AORTIC VALVE REPLACEMENT with 23mm Avalus Valve (Chest)      TRANSESOPHAGEAL ECHOCARDIOGRAM WITH ANESTHESIA (Chest) Diagnosis:       Nonrheumatic aortic valve stenosis      (Nonrheumatic aortic valve stenosis [I35.0])    Surgeons: Zac Mcbride MD Provider: Yogesh Doan MD    Anesthesia Type: general, Rapelje, CVL, PAC ASA Status: 4            Anesthesia Type: general, Rapelje, CVL, PAC    Vitals  Vitals Value Taken Time   /52 01/15/25 1349   Temp 98.4 °F (36.9 °C) 01/15/25 1218   Pulse 80 01/15/25 1352   Resp 17 01/15/25 1218   SpO2 95 % 01/15/25 1352   Vitals shown include unfiled device data.        Post Anesthesia Care and Evaluation    Patient location during evaluation: ICU  Patient participation: complete - patient participated  Level of consciousness: awake  Pain scale: See nurse's notes for pain score.  Pain management: adequate    Airway patency: patent  Anesthetic complications: No anesthetic complications  PONV Status: none  Cardiovascular status: acceptable  Respiratory status: acceptable and spontaneous ventilation  Hydration status: acceptable    Comments: Patient seen and examined postoperatively; vital signs stable; SpO2 greater than or equal to 90%; cardiopulmonary status stable; nausea/vomiting adequately controlled; pain adequately controlled; no apparent anesthesia complications; patient discharged from anesthesia care when discharge criteria were met

## 2025-01-16 ENCOUNTER — APPOINTMENT (OUTPATIENT)
Dept: GENERAL RADIOLOGY | Facility: HOSPITAL | Age: 70
DRG: 220 | End: 2025-01-16
Payer: MEDICARE

## 2025-01-16 LAB
ANION GAP SERPL CALCULATED.3IONS-SCNC: 6.8 MMOL/L (ref 5–15)
BUN SERPL-MCNC: 15 MG/DL (ref 8–23)
BUN/CREAT SERPL: 21.4 (ref 7–25)
CALCIUM SPEC-SCNC: 9.5 MG/DL (ref 8.6–10.5)
CHLORIDE SERPL-SCNC: 110 MMOL/L (ref 98–107)
CO2 SERPL-SCNC: 21.2 MMOL/L (ref 22–29)
CREAT SERPL-MCNC: 0.7 MG/DL (ref 0.57–1)
DEPRECATED RDW RBC AUTO: 46 FL (ref 37–54)
EGFRCR SERPLBLD CKD-EPI 2021: 94.3 ML/MIN/1.73
ERYTHROCYTE [DISTWIDTH] IN BLOOD BY AUTOMATED COUNT: 14.5 % (ref 12.3–15.4)
GLUCOSE BLDC GLUCOMTR-MCNC: 112 MG/DL (ref 70–105)
GLUCOSE BLDC GLUCOMTR-MCNC: 112 MG/DL (ref 70–105)
GLUCOSE BLDC GLUCOMTR-MCNC: 121 MG/DL (ref 70–105)
GLUCOSE BLDC GLUCOMTR-MCNC: 125 MG/DL (ref 70–105)
GLUCOSE BLDC GLUCOMTR-MCNC: 125 MG/DL (ref 70–105)
GLUCOSE BLDC GLUCOMTR-MCNC: 133 MG/DL (ref 70–105)
GLUCOSE BLDC GLUCOMTR-MCNC: 137 MG/DL (ref 70–105)
GLUCOSE BLDC GLUCOMTR-MCNC: 148 MG/DL (ref 70–105)
GLUCOSE BLDC GLUCOMTR-MCNC: 148 MG/DL (ref 70–105)
GLUCOSE BLDC GLUCOMTR-MCNC: 152 MG/DL (ref 70–105)
GLUCOSE SERPL-MCNC: 123 MG/DL (ref 65–99)
HCT VFR BLD AUTO: 31.3 % (ref 34–46.6)
HGB BLD-MCNC: 10 G/DL (ref 12–15.9)
LAB AP CASE REPORT: NORMAL
MCH RBC QN AUTO: 27.7 PG (ref 26.6–33)
MCHC RBC AUTO-ENTMCNC: 31.9 G/DL (ref 31.5–35.7)
MCV RBC AUTO: 86.7 FL (ref 79–97)
PATH REPORT.FINAL DX SPEC: NORMAL
PATH REPORT.GROSS SPEC: NORMAL
PLATELET # BLD AUTO: 101 10*3/MM3 (ref 140–450)
PMV BLD AUTO: 11 FL (ref 6–12)
POTASSIUM SERPL-SCNC: 4.2 MMOL/L (ref 3.5–5.2)
RBC # BLD AUTO: 3.61 10*6/MM3 (ref 3.77–5.28)
SODIUM SERPL-SCNC: 138 MMOL/L (ref 136–145)
WBC NRBC COR # BLD AUTO: 15.2 10*3/MM3 (ref 3.4–10.8)

## 2025-01-16 PROCEDURE — 71045 X-RAY EXAM CHEST 1 VIEW: CPT

## 2025-01-16 PROCEDURE — 93005 ELECTROCARDIOGRAM TRACING: CPT | Performed by: NURSE PRACTITIONER

## 2025-01-16 PROCEDURE — 93010 ELECTROCARDIOGRAM REPORT: CPT | Performed by: INTERNAL MEDICINE

## 2025-01-16 PROCEDURE — 99024 POSTOP FOLLOW-UP VISIT: CPT | Performed by: THORACIC SURGERY (CARDIOTHORACIC VASCULAR SURGERY)

## 2025-01-16 PROCEDURE — 25010000002 ENOXAPARIN PER 10 MG: Performed by: NURSE PRACTITIONER

## 2025-01-16 PROCEDURE — 25010000002 FUROSEMIDE PER 20 MG: Performed by: NURSE PRACTITIONER

## 2025-01-16 PROCEDURE — 25010000002 CEFAZOLIN PER 500 MG: Performed by: NURSE PRACTITIONER

## 2025-01-16 PROCEDURE — 85027 COMPLETE CBC AUTOMATED: CPT | Performed by: NURSE PRACTITIONER

## 2025-01-16 PROCEDURE — 99232 SBSQ HOSP IP/OBS MODERATE 35: CPT | Performed by: INTERNAL MEDICINE

## 2025-01-16 PROCEDURE — 80048 BASIC METABOLIC PNL TOTAL CA: CPT | Performed by: NURSE PRACTITIONER

## 2025-01-16 PROCEDURE — 25010000002 MORPHINE PER 10 MG: Performed by: NURSE PRACTITIONER

## 2025-01-16 PROCEDURE — 97530 THERAPEUTIC ACTIVITIES: CPT

## 2025-01-16 PROCEDURE — 82948 REAGENT STRIP/BLOOD GLUCOSE: CPT

## 2025-01-16 PROCEDURE — 97116 GAIT TRAINING THERAPY: CPT

## 2025-01-16 RX ORDER — IBUPROFEN 600 MG/1
1 TABLET ORAL
Status: DISCONTINUED | OUTPATIENT
Start: 2025-01-16 | End: 2025-01-18 | Stop reason: HOSPADM

## 2025-01-16 RX ORDER — DEXTROSE MONOHYDRATE 25 G/50ML
25 INJECTION, SOLUTION INTRAVENOUS
Status: DISCONTINUED | OUTPATIENT
Start: 2025-01-16 | End: 2025-01-18 | Stop reason: HOSPADM

## 2025-01-16 RX ORDER — POTASSIUM CHLORIDE 1500 MG/1
20 TABLET, EXTENDED RELEASE ORAL ONCE
Status: COMPLETED | OUTPATIENT
Start: 2025-01-16 | End: 2025-01-16

## 2025-01-16 RX ORDER — NICOTINE POLACRILEX 4 MG
15 LOZENGE BUCCAL
Status: DISCONTINUED | OUTPATIENT
Start: 2025-01-17 | End: 2025-01-16

## 2025-01-16 RX ORDER — TRAMADOL HYDROCHLORIDE 50 MG/1
50 TABLET ORAL EVERY 4 HOURS PRN
Status: DISCONTINUED | OUTPATIENT
Start: 2025-01-16 | End: 2025-01-17

## 2025-01-16 RX ORDER — NICOTINE POLACRILEX 4 MG
15 LOZENGE BUCCAL
Status: DISCONTINUED | OUTPATIENT
Start: 2025-01-16 | End: 2025-01-18 | Stop reason: HOSPADM

## 2025-01-16 RX ORDER — INSULIN LISPRO 100 [IU]/ML
2-9 INJECTION, SOLUTION INTRAVENOUS; SUBCUTANEOUS
Status: DISCONTINUED | OUTPATIENT
Start: 2025-01-16 | End: 2025-01-18 | Stop reason: HOSPADM

## 2025-01-16 RX ORDER — IBUPROFEN 600 MG/1
1 TABLET ORAL
Status: DISCONTINUED | OUTPATIENT
Start: 2025-01-17 | End: 2025-01-16

## 2025-01-16 RX ORDER — INSULIN LISPRO 100 [IU]/ML
2-7 INJECTION, SOLUTION INTRAVENOUS; SUBCUTANEOUS
Status: DISCONTINUED | OUTPATIENT
Start: 2025-01-16 | End: 2025-01-16

## 2025-01-16 RX ORDER — FUROSEMIDE 10 MG/ML
40 INJECTION INTRAMUSCULAR; INTRAVENOUS ONCE
Status: COMPLETED | OUTPATIENT
Start: 2025-01-16 | End: 2025-01-16

## 2025-01-16 RX ORDER — DEXTROSE MONOHYDRATE 25 G/50ML
25 INJECTION, SOLUTION INTRAVENOUS
Status: DISCONTINUED | OUTPATIENT
Start: 2025-01-17 | End: 2025-01-16

## 2025-01-16 RX ORDER — GABAPENTIN 100 MG/1
100 CAPSULE ORAL 3 TIMES DAILY
Status: DISCONTINUED | OUTPATIENT
Start: 2025-01-16 | End: 2025-01-18 | Stop reason: HOSPADM

## 2025-01-16 RX ADMIN — CYCLOBENZAPRINE HYDROCHLORIDE 5 MG: 10 TABLET, FILM COATED ORAL at 07:54

## 2025-01-16 RX ADMIN — GABAPENTIN 100 MG: 100 CAPSULE ORAL at 20:20

## 2025-01-16 RX ADMIN — Medication 12.5 MG: at 09:30

## 2025-01-16 RX ADMIN — Medication 12.5 MG: at 20:20

## 2025-01-16 RX ADMIN — MUPIROCIN 1 APPLICATION: 20 OINTMENT TOPICAL at 08:00

## 2025-01-16 RX ADMIN — TRAMADOL HYDROCHLORIDE 50 MG: 50 TABLET ORAL at 16:26

## 2025-01-16 RX ADMIN — CHLORHEXIDINE GLUCONATE, 0.12% ORAL RINSE 15 ML: 1.2 SOLUTION DENTAL at 08:00

## 2025-01-16 RX ADMIN — MORPHINE SULFATE 2 MG: 2 INJECTION, SOLUTION INTRAMUSCULAR; INTRAVENOUS at 04:13

## 2025-01-16 RX ADMIN — POTASSIUM CHLORIDE 20 MEQ: 1500 TABLET, EXTENDED RELEASE ORAL at 09:30

## 2025-01-16 RX ADMIN — GABAPENTIN 100 MG: 100 CAPSULE ORAL at 16:26

## 2025-01-16 RX ADMIN — MUPIROCIN 1 APPLICATION: 20 OINTMENT TOPICAL at 20:20

## 2025-01-16 RX ADMIN — TRAMADOL HYDROCHLORIDE 50 MG: 50 TABLET ORAL at 09:49

## 2025-01-16 RX ADMIN — SENNOSIDES AND DOCUSATE SODIUM 2 TABLET: 50; 8.6 TABLET ORAL at 20:20

## 2025-01-16 RX ADMIN — POLYETHYLENE GLYCOL 3350 17 G: 17 POWDER, FOR SOLUTION ORAL at 08:00

## 2025-01-16 RX ADMIN — POLYETHYLENE GLYCOL 3350 17 G: 17 POWDER, FOR SOLUTION ORAL at 20:20

## 2025-01-16 RX ADMIN — ENOXAPARIN SODIUM 40 MG: 100 INJECTION SUBCUTANEOUS at 16:26

## 2025-01-16 RX ADMIN — MORPHINE SULFATE 2 MG: 2 INJECTION, SOLUTION INTRAMUSCULAR; INTRAVENOUS at 07:54

## 2025-01-16 RX ADMIN — SENNOSIDES AND DOCUSATE SODIUM 2 TABLET: 50; 8.6 TABLET ORAL at 08:00

## 2025-01-16 RX ADMIN — CHLORHEXIDINE GLUCONATE, 0.12% ORAL RINSE 15 ML: 1.2 SOLUTION DENTAL at 20:20

## 2025-01-16 RX ADMIN — CEFAZOLIN 2 G: 2 INJECTION, POWDER, FOR SOLUTION INTRAMUSCULAR; INTRAVENOUS at 05:53

## 2025-01-16 RX ADMIN — ATORVASTATIN CALCIUM 40 MG: 40 TABLET, FILM COATED ORAL at 20:20

## 2025-01-16 RX ADMIN — ASPIRIN 81 MG: 81 TABLET, COATED ORAL at 08:00

## 2025-01-16 RX ADMIN — FUROSEMIDE 40 MG: 10 INJECTION, SOLUTION INTRAMUSCULAR; INTRAVENOUS at 09:30

## 2025-01-16 NOTE — PROGRESS NOTES
Cardiology [facility]      Patient Care Team:  Alvaro Cardona MD as PCP - General (General Surgery)  Provider, No Known as PCP - Family Medicine         Cardiology assessment and plan        Severe aortic stenosis status post arctic valve replacement surgery with Medtronic pericardial prosthesis 27 mm  Atrial appendage closure with a atrial clip  Pulmonary hypertension  Diastolic heart failure with LV ejection fraction of 45 to 50%  Hypertension  Hyperlipidemia  Normal thyroid function  Moderate to severe pulmonary hypertension  Mild mitral regurgitation  Cardiac catheterization with normal coronaries and moderate pulmonary hypertension  Patient is currently being paced and underlying rhythm is sinus rhythm with a left bundle branch block  Postop day 2  Patient is currently being paced  Hemodynamics are stable  Alert awake  Denies any new complaint  Denies any new cardiac symptoms  Discussed with surgical team  Tmax is 99 pulse is 96 respirations are 20 blood pressure is 154/82 sats are 93%  Sodium is 138 potassium is 4.2 creatinine is 0.7 hemoglobin is 10.0  Plans to start low-dose beta-blockers  Current medications include aspirin 81 mg p.o. once a day Lipitor 40 mg once a day metoprolol 12.5 mg p.o. twice daily  Patient is on Lovenox for DVT prophylaxis  Discussed with patient and family at bedside  Chest x-ray with no pneumothorax  Patient is clinically hemodynamically stable  Continue postsurgical care  Discussed with nursing staff  Continue current medical management  Further recommendation based on patient course      Chief Complaint: Shortness of breath    Subjective no new complaints    Interval History: No significant change in overall status    History taken from: patient    Review of Systems:  Review of Systems   Constitutional: Negative for chills, decreased appetite and malaise/fatigue.   HENT:  Negative for congestion and nosebleeds.    Eyes:  Negative for blurred vision and double vision.  "  Cardiovascular:  Negative for chest pain, dyspnea on exertion, irregular heartbeat, leg swelling, near-syncope, orthopnea, palpitations and paroxysmal nocturnal dyspnea.   Respiratory:  Negative for cough and shortness of breath.    Hematologic/Lymphatic: Negative for adenopathy. Does not bruise/bleed easily.   Skin:  Negative for color change and rash.   Musculoskeletal:  Negative for back pain and joint pain.   Gastrointestinal:  Negative for bloating, abdominal pain, hematemesis and hematochezia.   Genitourinary:  Negative for flank pain and hematuria.   Neurological:  Negative for dizziness and focal weakness.   Psychiatric/Behavioral:  Negative for altered mental status. The patient does not have insomnia.        Objective    Vital Signs  Visit Vitals  /82   Pulse 96   Temp 98.1 °F (36.7 °C) (Oral)   Resp 21   Ht 144.8 cm (57\")   Wt 68.9 kg (152 lb)   SpO2 93%   BMI 32.89 kg/m²     Oxygen Therapy  SpO2: 93 %  Pulse Oximetry Type: Continuous  Device (Oxygen Therapy): nasal cannula  Flow (L/min) (Oxygen Therapy): 4  Oxygen Concentration (%): 40  Flowsheet Rows      Flowsheet Row First Filed Value   Admission Height 144.8 cm (57\") Documented at 01/14/2025 0646   Admission Weight 65.7 kg (144 lb 12.8 oz) Documented at 01/14/2025 0646          Intake & Output (last 3 days)         01/12 0701  01/13 0700 01/13 0701  01/14 0700 01/14 0701  01/15 0700 01/15 0701  01/16 0700    P.O.   300 720    I.V. (mL/kg)   1993 (30)     Blood   600     IV Piggyback   1150     Total Intake(mL/kg)   4043 (60.9) 720 (10.8)    Urine (mL/kg/hr)   2380 (1.5) 220 (0.3)    Blood   500     Chest Tube   149 100    Dialysis   1900     Total Output   4929 320    Net   -886 +400            Urine Unmeasured Occurrence   2 x           Lines, Drains & Airways       Active LDAs       Name Placement date Placement time Site Days    CVC Double Lumen 01/14/25 Right Internal jugular 01/14/25  1323  created via procedure documentation  Internal " jugular  1    Urethral Catheter Temperature probe 16 Fr. 01/14/25  --  -- 1    Y Chest Tube 1 and 2 Mediastinal 28 Fr. Mediastinal 28 Fr. 01/14/25  --  -- 1    Pacer Wires 01/14/25  --  Atrial and Ventricular  1                    Physical Exam:  Constitutional:       Appearance: Well-developed.   Eyes:      Conjunctiva/sclera: Conjunctivae normal.      Pupils: Pupils are equal, round, and reactive to light.   HENT:      Head: Normocephalic and atraumatic.   Neck:      Thyroid: No thyromegaly.   Pulmonary:      Effort: Pulmonary effort is normal.      Breath sounds: Normal breath sounds. Examination of the right-lower field reveals decreased breath sounds. Examination of the left-lower field reveals decreased breath sounds.   Cardiovascular:      Normal rate. Regular rhythm.   Pulses:     Intact distal pulses.   Edema:     Peripheral edema absent.   Abdominal:      General: Bowel sounds are normal.      Palpations: Abdomen is soft.   Musculoskeletal:      Cervical back: Normal range of motion and neck supple. Skin:     General: Skin is warm.   Neurological:      Mental Status: Alert and oriented to person, place, and time.     Extubated  Alert and awake  Chest tubes are in place  Currently being paced    Results Review:     I reviewed the patient's new clinical results.    Lab Results (last 24 hours)       Procedure Component Value Units Date/Time    Tissue Pathology Exam [[id number]] Collected: 01/14/25 1337    Specimen: Tissue from Aortic valve Updated: 01/16/25 1012     Case Report --     Surgical Pathology Report                         Case: [id number]                                  Authorizing Provider:  Zac Mcbride MD       Collected:           01/14/2025 01:37 PM          Ordering Location:     Psychiatric       Received:            01/14/2025 02:59 PM                                 [facility]                                                                            ROOM                                                                          Pathologist:           Rodolfo Bernal MD                                                             Specimen:    Aortic valve, aortic Valve leaflets.                                                        Final Diagnosis --     Aortic valve leaflets, excision:    Benign valvular tissue with hyalinized fibrosis and dystrophic calcifications    JPR       Gross Description --     1. Aortic valve.  Received in formalin labeled aortic valve leaflets are 2 semilunar whitish-tan fragments of glistening soft focally calcified tissue consistent with cardiac leaflets, 0.7 x 2.5 x 0.5 cm.  Representative sections are submitted following brief decalcification.    [place]        POC Glucose Once [[id number]]  (Abnormal) Collected: 01/16/25 0745    Specimen: Blood Updated: 01/16/25 0746     Glucose 112 mg/dL      Comment: Serial Number: [id number]:  [id number]       POC Glucose Once [[id number]]  (Abnormal) Collected: 01/16/25 0619    Specimen: Blood Updated: 01/16/25 0621     Glucose 112 mg/dL      Comment: Serial Number: [id number]:  [id number]       POC Glucose Once [[id number]]  (Abnormal) Collected: 01/16/25 0504    Specimen: Blood Updated: 01/16/25 0505     Glucose 125 mg/dL      Comment: Serial Number: [id number]:  [id number]       Basic Metabolic Panel [[id number]]  (Abnormal) Collected: 01/16/25 0357    Specimen: Blood Updated: 01/16/25 0430     Glucose 123 mg/dL      BUN 15 mg/dL      Creatinine 0.70 mg/dL      Sodium 138 mmol/L      Potassium 4.2 mmol/L      Chloride 110 mmol/L      CO2 21.2 mmol/L      Calcium 9.5 mg/dL      BUN/Creatinine Ratio 21.4     Anion Gap 6.8 mmol/L      eGFR 94.3 mL/min/1.73     Narrative:      GFR Categories in Chronic Kidney Disease (CKD)      GFR Category          GFR (mL/min/1.73)    Interpretation  G1                     90 or greater         Normal or high (1)  G2                      60-89                Mild  decrease (1)  G3a                   45-59                Mild to moderate decrease  G3b                   30-44                Moderate to severe decrease  G4                    15-29                Severe decrease  G5                    14 or less           Kidney failure          (1)In the absence of evidence of kidney disease, neither GFR category G1 or G2 fulfill the criteria for CKD.    eGFR calculation 2021 CKD-EPI creatinine equation, which does not include race as a factor    CBC (No Diff) [[id number]]  (Abnormal) Collected: 01/16/25 0357    Specimen: Blood Updated: 01/16/25 0416     WBC 15.20 10*3/mm3      RBC 3.61 10*6/mm3      Hemoglobin 10.0 g/dL      Hematocrit 31.3 %      MCV 86.7 fL      MCH 27.7 pg      MCHC 31.9 g/dL      RDW 14.5 %      RDW-SD 46.0 fl      MPV 11.0 fL      Platelets 101 10*3/mm3     POC Glucose Once [[id number]]  (Abnormal) Collected: 01/16/25 0355    Specimen: Blood Updated: 01/16/25 0357     Glucose 133 mg/dL      Comment: Serial Number: [id number]:  [id number]       POC Glucose Once [[id number]]  (Abnormal) Collected: 01/16/25 0224    Specimen: Blood Updated: 01/16/25 0226     Glucose 148 mg/dL      Comment: Serial Number: [id number]:  [id number]       POC Glucose Once [[id number]]  (Abnormal) Collected: 01/16/25 0115    Specimen: Blood Updated: 01/16/25 0117     Glucose 152 mg/dL      Comment: Serial Number: [id number]:  [id number]       POC Glucose Once [[id number]]  (Abnormal) Collected: 01/16/25 0008    Specimen: Blood Updated: 01/16/25 0009     Glucose 148 mg/dL      Comment: Serial Number: [id number]:  [id number]       POC Glucose Once [[id number]]  (Abnormal) Collected: 01/15/25 2228    Specimen: Blood Updated: 01/15/25 2231     Glucose 132 mg/dL      Comment: Serial Number: [id number]:  [id number]       POC Glucose Once [[id number]]  (Normal) Collected: 01/15/25 2207    Specimen: Blood Updated: 01/15/25 2208     Glucose 92 mg/dL      Comment:  Serial Number: [id number]:  [id number]       POC Glucose Once [[id number]]  (Abnormal) Collected: 01/15/25 2114    Specimen: Blood Updated: 01/15/25 2115     Glucose 139 mg/dL      Comment: Serial Number: [id number]:  [id number]       POC Glucose Once [[id number]]  (Abnormal) Collected: 01/15/25 1936    Specimen: Blood Updated: 01/15/25 1938     Glucose 169 mg/dL      Comment: Serial Number: [id number]:  [id number]       POC Glucose Once [[id number]]  (Abnormal) Collected: 01/15/25 1810    Specimen: Blood Updated: 01/15/25 1814     Glucose 123 mg/dL      Comment: Serial Number: [id number]:  [id number]       POC Glucose Once [[id number]]  (Abnormal) Collected: 01/15/25 1705    Specimen: Blood Updated: 01/15/25 1708     Glucose 132 mg/dL      Comment: Serial Number: [id number]:  [id number]       POC Glucose Once [[id number]]  (Abnormal) Collected: 01/15/25 1514    Specimen: Blood Updated: 01/15/25 1516     Glucose 137 mg/dL      Comment: Serial Number: [id number]:  [id number]       POC Glucose Once [[id number]]  (Abnormal) Collected: 01/15/25 1330    Specimen: Blood Updated: 01/15/25 1331     Glucose 150 mg/dL      Comment: Serial Number: [id number]:  [id number]             Results for orders placed during the hospital encounter of 12/28/24    Adult Transthoracic Echo Complete W/ Cont if Necessary Per Protocol    Interpretation Summary    Left ventricular ejection fraction appears to be 46 - 50%.    Left ventricular diastolic function is consistent with (grade II w/high LAP) pseudonormalization.    The left atrial cavity is moderate to severely dilated.    Severe aortic valve stenosis is present.    Moderate mitral valve regurgitation is present.    Estimated right ventricular systolic pressure from tricuspid regurgitation is moderately elevated (45-55 mmHg).    Moderate to severe pulmonary hypertension is present.        Medication Review:   I have reviewed the patient's current  medication list  Scheduled Meds:aspirin, 81 mg, Oral, Daily  atorvastatin, 40 mg, Oral, Nightly  chlorhexidine, 15 mL, Mouth/Throat, Q12H  enoxaparin, 40 mg, Subcutaneous, Q24H  insulin lispro, 2-9 Units, Subcutaneous, 4x Daily AC & at Bedtime  metoprolol tartrate, 12.5 mg, Oral, Q12H  mupirocin, 1 Application, Each Nare, BID  polyethylene glycol, 17 g, Oral, BID  senna-docusate sodium, 2 tablet, Oral, BID      Continuous Infusions:     PRN Meds:.  acetaminophen **OR** acetaminophen **OR** acetaminophen    bisacodyl    bisacodyl    Calcium Replacement - Follow Nurse / BPA Driven Protocol    cyclobenzaprine    dextrose    dextrose    glucagon (human recombinant)    Magnesium Cardiology Dose Replacement - Follow Nurse / BPA Driven Protocol    magnesium hydroxide    Morphine **AND** naloxone    nitroglycerin    ondansetron    Phosphorus Replacement - Follow Nurse / BPA Driven Protocol    Potassium Replacement - Follow Nurse / BPA Driven Protocol    traMADol    ECG/EMG Results (last 24 hours)       Procedure Component Value Units Date/Time    ECG 12 Lead Other; post op [[id number]] Collected: 01/14/25 1756     Updated: 01/14/25 2047     QT Interval 449 ms      QTC Interval 496 ms     Narrative:      HEART RATE=73  bpm  RR Baysywml=934  ms  OK Ltfuddrn=822  ms  P Horizontal Axis=7  deg  P Front Axis=71  deg  QRSD Nauxdinv=997  ms  QT Codocynz=769  ms  HCdG=830  ms  QRS Axis=60  deg  T Wave Axis=190  deg  - ABNORMAL ECG -  Sinus rhythm  Left bundle branch block  ST elevation secondary to IVCD  When compared with ECG of 13-Jan-2025 08:00:09,  No significant change  Electronically Signed By: Makayla Driscoll ([facility]) 2025-01-14 20:44:20  Date and Time of Study:2025-01-14 17:56:05    ECG 12 Lead Other; post op [[id number]] Collected: 01/15/25 0309     Updated: 01/15/25 0309     QT Interval 447 ms      QTC Interval 484 ms     Narrative:      HEART RATE=74  bpm  RR Hsfigpig=149  ms  OK Xemnpcww=971  ms  P Horizontal Axis=   deg  P Front Axis=47  deg  QRSD Pkxlhzfq=534  ms  QT Crzardac=524  ms  BUvU=181  ms  QRS Axis=38  deg  T Wave Axis=138  deg  - ABNORMAL ECG -  Sinus rhythm  Atrial premature complexes  Probable left atrial enlargement  Left bundle branch block  ST elevation secondary to IVCD  Date and Time of Study:2025-01-15 03:09:08            Imaging Results (Last 24 Hours)       Procedure Component Value Units Date/Time    XR Chest 1 View [[id number]] Collected: 01/16/25 0721     Updated: 01/16/25 0724    Narrative:      XR CHEST 1 VW    Date of Exam: 1/16/2025 3:31 AM EST    Indication: Post-Op Heart Surgery    Comparison: 1/15/2025    Findings:  [place]-Oscar catheter removed. Right IJ vascular sheath in place with tip at the mid SVC. Postsurgical changes of sternotomy. Stable chest tubes. Left atrial appendage clip noted. Negative for pneumothorax. Mild left basilar atelectasis unchanged. No   significant effusion. No consolidation in the right lung.      Impression:      Impression:  1. Removal of [place]-Oscar catheter with right IJ vascular sheath in place.  2. Stable chest tubes. No pneumothorax.  3. Unchanged mild left basilar atelectasis.          Electronically Signed: Nitin Evans MD    1/16/2025 7:22 AM EST    Workstation ID: [id number]          Results for orders placed during the hospital encounter of 12/28/24    Cardiac Catheterization/Vascular Study    Conclusion  Table formatting from the original result was not included.  Cardiac Catheterization Operative Report    Rachael BARR Michael  [id number]  12/30/2024  @PCP@    She underwent cardiac catheterization.    Indications for the procedure include: Valvular heart disease with severe aortic stenosis.    Results for orders placed during the hospital encounter of 12/28/24    Adult Transthoracic Echo Complete W/ Cont if Necessary Per Protocol    Interpretation Summary    Left ventricular ejection fraction appears to be 46 - 50%.    Left ventricular diastolic function is  consistent with (grade II w/high LAP) pseudonormalization.    The left atrial cavity is moderate to severely dilated.    Severe aortic valve stenosis is present.    Moderate mitral valve regurgitation is present.    Estimated right ventricular systolic pressure from tricuspid regurgitation is moderately elevated (45-55 mmHg).    Moderate to severe pulmonary hypertension is present.    No results found for this or any previous visit.        Procedure Details:  The risks, benefits, complications, treatment options, and expected outcomes were discussed with the patient. The patient and/or family concurred with the proposed plan, giving informed consent.    After informed consent the patient was brought to the cath lab after appropriate IV hydration was begun and oral premedication was given. She was further sedated with fentanyl. She was prepped and draped in the usual manner. Using the modified Seldinger access technique, a 6 Gu[place] sheath was placed in the femoral artery. A left heart catheterization with coronary arteriography was performed. Findings are discussed below.    7 [place]ian venous sheath was placed in the right femoral vein using ultrasound guidance and then we used a 7 Gu[place]ian chest tube Castine-Oscar catheter to the right heart catheterization and hemodynamic pressure measurements.    After the procedure was completed, sedation was stopped and the sheaths and catheters were all removed. Hemostasis was achieved per established hospital protocols.    Conscious sedation:  Conscious sedation was performed according to protocol.  I supervised and directed an independent trained observer with the assistance of monitoring the patient's level of consciousness and physiologic status throughout the procedure.  Intraoperative service time was 90 minutes.    Findings:    Hemodynamics Central aortic pressure systolic 146 diastolic 88 with a mean pressure of 150 mmHg      Right heart catheterization with  hemodynamics      Pulmonary capillary wedge pressure was 20 mmHg  PA pressure systolic 45 diastolic 28 with a mean pressure of 36 mmHg  RV pressure systolic 40 mmHg  Right atrial pressure was 8 mmHg  PA saturation was 73%  Right atrial saturation was of 69%  Aortic saturation was 96%  Silvia equation cardiac output was 4.3 L/min  Silvia equation cardiac index was 2.6 L/min/m²  Pulmonary vascular resistance was 239 dynes per second  Systemic vascular resistance was 2002 dynes per second  Left Main Large-caliber vessel angiographically normal bifurcates into left anterior descending and left circumflex arteries  RCA Large-caliber vessel angiographically normal  LAD Large-caliber vessel angiographically normal  Circ Large-caliber vessel angiographically normal  LV Not done  Coronary Dominance Right coronary artery    Estimated Blood Loss:  Minimal    Specimens: None    Complications:  None; patient tolerated the procedure well.    Disposition: PACU - hemodynamically stable.    Condition: stable    Impressions:  Normal coronaries  Moderate pulmonary hypertension    Recommendations:  Further evaluation for TAVR versus surgical AVR     Results for orders placed during the hospital encounter of 12/28/24    Adult Transthoracic Echo Complete W/ Cont if Necessary Per Protocol    Interpretation Summary    Left ventricular ejection fraction appears to be 46 - 50%.    Left ventricular diastolic function is consistent with (grade II w/high LAP) pseudonormalization.    The left atrial cavity is moderate to severely dilated.    Severe aortic valve stenosis is present.    Moderate mitral valve regurgitation is present.    Estimated right ventricular systolic pressure from tricuspid regurgitation is moderately elevated (45-55 mmHg).    Moderate to severe pulmonary hypertension is present.     Lab Results   Component Value Date    GLUCOSE 123 (H) 01/16/2025    BUN 15 01/16/2025    CREATININE 0.70 01/16/2025    EGFR 94.3 01/16/2025    BCR  21.4 01/16/2025    K 4.2 01/16/2025    CO2 21.2 (L) 01/16/2025    CALCIUM 9.5 01/16/2025    ALBUMIN 4.8 01/15/2025    BILITOT 0.4 01/13/2025    AST 25 01/13/2025    ALT 16 01/13/2025      Lab Results   Component Value Date    CHOL 254 (H) 01/13/2025    TRIG 114 01/13/2025    HDL 78 (H) 01/13/2025     (H) 01/13/2025      Lab Results   Component Value Date    TROPONINT 16 (H) 12/28/2024        Assessment & Plan       Nonrheumatic aortic valve stenosis    Nonrheumatic aortic (valve) stenosis        Makayla Driscoll MD  01/16/25  10:48 EST

## 2025-01-16 NOTE — PROGRESS NOTES
S/P POD #2 elective tissue AVR/ RICH closure--Reed  EF 45-50% (BENJAMIN)    Subjective:   Reports just finishing a walk with PT.     Drips: none  Wt is up 3 kg from preop      Intake/Output Summary (Last 24 hours) at 1/16/2025 1037  Last data filed at 1/16/2025 0800  Gross per 24 hour   Intake 2039 ml   Output 1010 ml   Net 1029 ml     Temp:  [97.8 °F (36.6 °C)-98.5 °F (36.9 °C)] 98.1 °F (36.7 °C)  Heart Rate:  [80-97] 96  Resp:  [17-21] 21  BP: ()/(42-83) 154/82  CT 80/8    Results from last 7 days   Lab Units 01/16/25  0357 01/15/25  0330 01/14/25  1632 01/14/25  1628 01/14/25  1450   WBC 10*3/mm3 15.20* 14.10*  --  18.39* 10.87*   HEMOGLOBIN g/dL 10.0* 10.8*  --  12.6 8.4*   HEMOGLOBIN, POC   --   --    < >  --   --    HEMATOCRIT % 31.3* 33.1*  --  38.1 25.1*   HEMATOCRIT POC   --   --    < >  --   --    PLATELETS 10*3/mm3 101* 108*  --  136* 130*   INR   --  1.38*  --  1.53* 1.81*    < > = values in this interval not displayed.     Results from last 7 days   Lab Units 01/16/25  0357 01/15/25  0806 01/15/25  0330   CREATININE mg/dL 0.70  --  0.72   POTASSIUM mmol/L 4.2   < > 4.9   SODIUM mmol/L 138  --  151*   MAGNESIUM mg/dL  --   --  2.8*   PHOSPHORUS mg/dL  --   --  4.2    < > = values in this interval not displayed.     Physical Exam:  Neuro intact, nad, up in recliner,  at bedside  Tele: SR 80s  Diminished bases, 93% RA  dsg c/d/i, no drainage  Benign abd, no BM  Trace LE edema    Assessment/Plan:  Principal Problem:    Nonrheumatic aortic valve stenosis  Active Problems:    Nonrheumatic aortic (valve) stenosis    - Severe aortic stenosis/ mild to moderate mitral regurgitation, EF 45-50% (BENJAMIN)--s/p elective tissue AVR (27 mm Avalus ultra Medtronic)/ left atrial appendage epicardial closure 45 mm atrial clip device (Pagni)  - Chronic HFmrEF, NYHA class III--GDMT  - Moderate to severe pHTN--PAP 45/28, mean 36 on RHC  - HTN with grade II diastolic dysfunction  - HLD--statin  - Postop leukocytosis,  likely reactive--pulm toileting  - Postop ABLA, expected--watch closely  - Postop TCP, consumptive--watch closely  - Postop hypernatremia r/t intravascularly dry--encourage oral fluids      POD #2. Doing well. On room air. Ambulating well. Not requiring insulin drip, so will start sliding scale. Start Lopressor 12.5 mg po bid. Lasix 40 mg IV once. Discontinue central line and chest tubes. Remove orellana catheter later this afternoon. Encourage IS and ambulation.     Routine care--as above  D/w pt/family, nsg  Anticipate home at discharge      VASILIY Sutton  1/16/2025  10:37 EST

## 2025-01-16 NOTE — CASE MANAGEMENT/SOCIAL WORK
Continued Stay Note  [facility]     Patient Name: Rachael Rodriguez  MRN: [id number]  Today's Date: 1/16/2025    Admit Date: 1/14/2025    Plan: DC Plan: Home with family assist. AVR 1/14/2025   Discharge Plan       Row Name 01/16/25 1010       Plan    Plan DC Plan: Home with family assist. AVR 1/14/2025    Patient/Family in Agreement with Plan yes    Provided Post Acute Provider List? N/A    Provided Post Acute Provider Quality & Resource List? N/A    Plan Comments CM spoke with patient’s nurse and [facility] PA Kayleigh Farrell to obtain clinical updates. Patient improving. Watch for Home Oxygen needs.CM will continue to follow for any additional needs and adjust DC plan accordingly. DC Barriers: POD 2 OHS, Cardiac monitoring, Pacer wires, O2@4L nc, and monitor labs.               Expected Discharge Date and Time       Expected Discharge Date Expected Discharge Time    Jan 18, 2025               Nataly Gauthier RN    Office Phone: [phone]  Office Cell:     [phone]

## 2025-01-16 NOTE — THERAPY TREATMENT NOTE
Subjective: Pt agreeable to therapeutic plan of care. Pt and  very anxious this morning. Had many questions regarding transfers, etc. Pt noting that she is scared to let go of her heart hugger, as she is worried that her sternum will pop loose. So as a result, she spent the entire night tightly gripping her heart hugger. Now is having soreness and pain in upper spine. PT instructed pt to use heart hugger only when coughing or changing positions. Is to practice allowing hands to relax at her side. Also to play music that she likes to aid in decreasing anxiety.    Objective:    Phase 1 Cardiac Rehab Initiated - Acute Care    Cardiac Level III Activities  Sitting tolerance: >10min and supported  Standing tolerance: 5-10min and supported    Precautions:  Mid-sternal incision; avoid scapular retraction and depression.  Cardiovascular impairment post-sx; encourage energy conservation strategies.    MET level equivalent: 2.0-3.0 (Unlimited sitting, ambulation on level ground <2mph, light housework)    Bed mobility - N/A or Not attempted.  Transfers - CGA, Min-A, and with rolling walker  Ambulation - 150 feet Min-A and with rolling walker    Vitals: WNL; on room air.    Pain: 2 VAS   Location: upper back  Intervention for pain: Repositioned, RN provided medication, RN notified, Increased Activity, and Therapeutic Presence; ice to thoracic spine    Education: Provided education on the importance of mobility in the acute care setting, Verbal/Tactile Cues, Transfer Training, Gait Training, Energy conservation strategies, WB'ing status, and Post-Op Precautions    Assessment: Rachael Rodriguez was very anxious at start of rx but calmed as rx progressed. Advancing well for gait. Pt presents with functional mobility impairments which indicate the need for skilled intervention. Tolerating session today without incident. Will continue to follow and progress as tolerated.     Plan/Recommendations:   If medically appropriate,  "Low Intensity Therapy recommended post-acute care - This is recommended as therapy feels this patient would require 2-3 visits per week. OP or HH would be the best option depending on patient's home bound status. Consider, if the patient has other  \"skilled\" needs such as wounds, IV antibiotics, etc. Combined with \"low intensity\" could also equate to a SNF. If patient is medically complex, consider LTAC. Pt requires no DME at discharge.     Pt desires Home with family assist at discharge. Pt cooperative; agreeable to therapeutic recommendations and plan of care.         Basic Mobility 6-click:  Rollin = Total, A lot = 2, A little = 3; 4 = None  Supine>Sit:   1 = Total, A lot = 2, A little = 3; 4 = None   Sit>Stand with arms:  1 = Total, A lot = 2, A little = 3; 4 = None  Bed>Chair:   1 = Total, A lot = 2, A little = 3; 4 = None  Ambulate in room:  1 = Total, A lot = 2, A little = 3; 4 = None  3-5 Steps with railin = Total, A lot = 2, A little = 3; 4 = None  Score: 15    Modified [place]: N/A = No pre-op stroke/TIA    Post-Tx Position: Up in Chair, Staff Present, Alarms activated, and Call light and personal items within reach  PPE: gloves    Therapy Charges for Today       Code Description Service Date Service Provider Modifiers Qty    34035508852  PT EVAL HIGH COMPLEXITY 4 1/15/2025 Kiya Lea, PT GP 1    92471758829 HC GAIT TRAINING EA 15 MIN 2025 Kiya Lea, PT GP 2    [id number]  PT THERAPEUTIC ACT EA 15 MIN 2025 Kiya Lea, PT GP 1           PT Charges       Row Name 25 1005             Time Calculation    Start Time 0835  -CM      Stop Time 0910  -CM      Time Calculation (min) 35 min  -CM      PT Received On 25  -CM      PT - Next Appointment 25  -CM         Time Calculation- PT    Total Timed Code Minutes- PT 35 minute(s)  -CM                User Key  (r) = Recorded By, (t) = Taken By, (c) = Cosigned By      Initials Name Provider Type    " Kiya Castillo, PT Physical Therapist

## 2025-01-16 NOTE — PLAN OF CARE
Goal Outcome Evaluation:      DC chest tubes, Central line, Insulin gtt, and orellana patient due to void

## 2025-01-16 NOTE — PLAN OF CARE
"Goal Outcome Evaluation:  Plan of Care Reviewed With: patient, spouse         Assessment: Rachael Rodriguez was very anxious at start of rx but calmed as rx progressed. Advancing well for gait. Pt presents with functional mobility impairments which indicate the need for skilled intervention. Tolerating session today without incident. Will continue to follow and progress as tolerated.     Plan/Recommendations:   If medically appropriate, Low Intensity Therapy recommended post-acute care - This is recommended as therapy feels this patient would require 2-3 visits per week. OP or HH would be the best option depending on patient's home bound status. Consider, if the patient has other  \"skilled\" needs such as wounds, IV antibiotics, etc. Combined with \"low intensity\" could also equate to a SNF. If patient is medically complex, consider LTAC. Pt requires no DME at discharge.     Pt desires Home with family assist at discharge. Pt cooperative; agreeable to therapeutic recommendations and plan of care.         Anticipated Discharge Disposition (PT): home with assist                        "

## 2025-01-17 PROBLEM — Z95.2 S/P AVR: Status: ACTIVE | Noted: 2025-01-17

## 2025-01-17 LAB
ANION GAP SERPL CALCULATED.3IONS-SCNC: 9.2 MMOL/L (ref 5–15)
BUN SERPL-MCNC: 14 MG/DL (ref 8–23)
BUN/CREAT SERPL: 24.1 (ref 7–25)
CALCIUM SPEC-SCNC: 9.3 MG/DL (ref 8.6–10.5)
CHLORIDE SERPL-SCNC: 104 MMOL/L (ref 98–107)
CO2 SERPL-SCNC: 23.8 MMOL/L (ref 22–29)
CREAT SERPL-MCNC: 0.58 MG/DL (ref 0.57–1)
DEPRECATED RDW RBC AUTO: 44.3 FL (ref 37–54)
EGFRCR SERPLBLD CKD-EPI 2021: 98.7 ML/MIN/1.73
ERYTHROCYTE [DISTWIDTH] IN BLOOD BY AUTOMATED COUNT: 14.3 % (ref 12.3–15.4)
GLUCOSE BLDC GLUCOMTR-MCNC: 107 MG/DL (ref 70–105)
GLUCOSE BLDC GLUCOMTR-MCNC: 115 MG/DL (ref 70–105)
GLUCOSE BLDC GLUCOMTR-MCNC: 128 MG/DL (ref 70–105)
GLUCOSE BLDC GLUCOMTR-MCNC: 96 MG/DL (ref 70–105)
GLUCOSE SERPL-MCNC: 102 MG/DL (ref 65–99)
HCT VFR BLD AUTO: 31.1 % (ref 34–46.6)
HGB BLD-MCNC: 10.2 G/DL (ref 12–15.9)
MCH RBC QN AUTO: 28 PG (ref 26.6–33)
MCHC RBC AUTO-ENTMCNC: 32.8 G/DL (ref 31.5–35.7)
MCV RBC AUTO: 85.4 FL (ref 79–97)
PLATELET # BLD AUTO: 108 10*3/MM3 (ref 140–450)
PMV BLD AUTO: 10.7 FL (ref 6–12)
POTASSIUM SERPL-SCNC: 3.9 MMOL/L (ref 3.5–5.2)
POTASSIUM SERPL-SCNC: 3.9 MMOL/L (ref 3.5–5.2)
QT INTERVAL: 387 MS
QT INTERVAL: 447 MS
QTC INTERVAL: 434 MS
QTC INTERVAL: 484 MS
RBC # BLD AUTO: 3.64 10*6/MM3 (ref 3.77–5.28)
SODIUM SERPL-SCNC: 137 MMOL/L (ref 136–145)
WBC NRBC COR # BLD AUTO: 12.57 10*3/MM3 (ref 3.4–10.8)

## 2025-01-17 PROCEDURE — 97535 SELF CARE MNGMENT TRAINING: CPT

## 2025-01-17 PROCEDURE — 25010000002 FUROSEMIDE PER 20 MG: Performed by: NURSE PRACTITIONER

## 2025-01-17 PROCEDURE — 25010000002 ENOXAPARIN PER 10 MG: Performed by: NURSE PRACTITIONER

## 2025-01-17 PROCEDURE — 85027 COMPLETE CBC AUTOMATED: CPT | Performed by: NURSE PRACTITIONER

## 2025-01-17 PROCEDURE — 82948 REAGENT STRIP/BLOOD GLUCOSE: CPT

## 2025-01-17 PROCEDURE — 80048 BASIC METABOLIC PNL TOTAL CA: CPT | Performed by: NURSE PRACTITIONER

## 2025-01-17 PROCEDURE — 84132 ASSAY OF SERUM POTASSIUM: CPT | Performed by: THORACIC SURGERY (CARDIOTHORACIC VASCULAR SURGERY)

## 2025-01-17 PROCEDURE — 25010000002 MORPHINE PER 10 MG: Performed by: NURSE PRACTITIONER

## 2025-01-17 PROCEDURE — 94762 N-INVAS EAR/PLS OXIMTRY CONT: CPT

## 2025-01-17 PROCEDURE — 82948 REAGENT STRIP/BLOOD GLUCOSE: CPT | Performed by: NURSE PRACTITIONER

## 2025-01-17 PROCEDURE — 99232 SBSQ HOSP IP/OBS MODERATE 35: CPT | Performed by: INTERNAL MEDICINE

## 2025-01-17 PROCEDURE — 94799 UNLISTED PULMONARY SVC/PX: CPT

## 2025-01-17 PROCEDURE — 97530 THERAPEUTIC ACTIVITIES: CPT

## 2025-01-17 PROCEDURE — 97551 CAREGIVER TRAING EA ADDL 15: CPT

## 2025-01-17 RX ORDER — POTASSIUM CHLORIDE 1500 MG/1
20 TABLET, EXTENDED RELEASE ORAL ONCE
Status: DISCONTINUED | OUTPATIENT
Start: 2025-01-17 | End: 2025-01-17

## 2025-01-17 RX ORDER — POTASSIUM CHLORIDE 1.5 G/1.58G
20 POWDER, FOR SOLUTION ORAL ONCE
Status: COMPLETED | OUTPATIENT
Start: 2025-01-17 | End: 2025-01-17

## 2025-01-17 RX ORDER — FUROSEMIDE 10 MG/ML
40 INJECTION INTRAMUSCULAR; INTRAVENOUS ONCE
Status: COMPLETED | OUTPATIENT
Start: 2025-01-17 | End: 2025-01-17

## 2025-01-17 RX ORDER — ATORVASTATIN CALCIUM 20 MG/1
20 TABLET, FILM COATED ORAL NIGHTLY
Status: DISCONTINUED | OUTPATIENT
Start: 2025-01-17 | End: 2025-01-17

## 2025-01-17 RX ORDER — TRAMADOL HYDROCHLORIDE 50 MG/1
25 TABLET ORAL EVERY 6 HOURS PRN
Qty: 20 TABLET | Refills: 0 | Status: CANCELLED | OUTPATIENT
Start: 2025-01-17

## 2025-01-17 RX ORDER — TRAMADOL HYDROCHLORIDE 50 MG/1
25 TABLET ORAL EVERY 4 HOURS PRN
Status: DISCONTINUED | OUTPATIENT
Start: 2025-01-17 | End: 2025-01-18 | Stop reason: HOSPADM

## 2025-01-17 RX ADMIN — ASPIRIN 81 MG: 81 TABLET, COATED ORAL at 08:54

## 2025-01-17 RX ADMIN — MUPIROCIN 1 APPLICATION: 20 OINTMENT TOPICAL at 08:54

## 2025-01-17 RX ADMIN — TRAMADOL HYDROCHLORIDE 50 MG: 50 TABLET ORAL at 08:54

## 2025-01-17 RX ADMIN — POTASSIUM CHLORIDE 20 MEQ: 1.5 POWDER, FOR SOLUTION ORAL at 12:32

## 2025-01-17 RX ADMIN — ENOXAPARIN SODIUM 40 MG: 100 INJECTION SUBCUTANEOUS at 16:59

## 2025-01-17 RX ADMIN — CHLORHEXIDINE GLUCONATE, 0.12% ORAL RINSE 15 ML: 1.2 SOLUTION DENTAL at 20:01

## 2025-01-17 RX ADMIN — GABAPENTIN 100 MG: 100 CAPSULE ORAL at 08:54

## 2025-01-17 RX ADMIN — POTASSIUM CHLORIDE 20 MEQ: 1.5 POWDER, FOR SOLUTION ORAL at 07:48

## 2025-01-17 RX ADMIN — Medication 12.5 MG: at 20:01

## 2025-01-17 RX ADMIN — FUROSEMIDE 40 MG: 10 INJECTION, SOLUTION INTRAMUSCULAR; INTRAVENOUS at 12:32

## 2025-01-17 RX ADMIN — CHLORHEXIDINE GLUCONATE, 0.12% ORAL RINSE 15 ML: 1.2 SOLUTION DENTAL at 08:54

## 2025-01-17 RX ADMIN — SENNOSIDES AND DOCUSATE SODIUM 2 TABLET: 50; 8.6 TABLET ORAL at 08:54

## 2025-01-17 RX ADMIN — GABAPENTIN 100 MG: 100 CAPSULE ORAL at 17:00

## 2025-01-17 RX ADMIN — POLYETHYLENE GLYCOL 3350 17 G: 17 POWDER, FOR SOLUTION ORAL at 08:54

## 2025-01-17 RX ADMIN — POTASSIUM CHLORIDE 20 MEQ: 1.5 POWDER, FOR SOLUTION ORAL at 19:20

## 2025-01-17 RX ADMIN — CYCLOBENZAPRINE HYDROCHLORIDE 5 MG: 10 TABLET, FILM COATED ORAL at 20:01

## 2025-01-17 RX ADMIN — TRAMADOL HYDROCHLORIDE 25 MG: 50 TABLET ORAL at 20:01

## 2025-01-17 RX ADMIN — MORPHINE SULFATE 2 MG: 2 INJECTION, SOLUTION INTRAMUSCULAR; INTRAVENOUS at 05:57

## 2025-01-17 RX ADMIN — Medication 12.5 MG: at 08:54

## 2025-01-17 RX ADMIN — GABAPENTIN 100 MG: 100 CAPSULE ORAL at 20:02

## 2025-01-17 NOTE — SIGNIFICANT NOTE
01/17/25 1521   OTHER   Discipline physical therapist   Rehab Time/Intention   Session Not Performed patient unavailable for treatment;other (see comments)  (checked on pt multiple times during day; pt initially working w/ OT; later was in shower, and then too exhausted from ambulating w/ nurse right after shower.)   Recommendation   PT - Next Appointment 01/19/25

## 2025-01-17 NOTE — PLAN OF CARE
Pt had no complaints through the night. Pt still has yet to have bm, but is passing flatus. Pt expresses nervousness when ambulating and requires encouragement. NSR with bbb on tele. No further orders at this time. Care continues.     Problem: Adult Inpatient Plan of Care  Goal: Absence of Hospital-Acquired Illness or Injury  Intervention: Identify and Manage Fall Risk  Recent Flowsheet Documentation  Taken 1/17/2025 0400 by Christelle Cutler RN  Safety Promotion/Fall Prevention: safety round/check completed  Taken 1/17/2025 0200 by Christelle Cutler RN  Safety Promotion/Fall Prevention: safety round/check completed  Intervention: Prevent Skin Injury  Recent Flowsheet Documentation  Taken 1/17/2025 0400 by Christelle Cutler RN  Body Position: position changed independently     Problem: Fall Injury Risk  Goal: Absence of Fall and Fall-Related Injury  Intervention: Promote Injury-Free Environment  Recent Flowsheet Documentation  Taken 1/17/2025 0400 by Christelle Cutler RN  Safety Promotion/Fall Prevention: safety round/check completed  Taken 1/17/2025 0200 by Christelle Cutler RN  Safety Promotion/Fall Prevention: safety round/check completed   Goal Outcome Evaluation:

## 2025-01-17 NOTE — PLAN OF CARE
"Goal Outcome Evaluation:      Assessment: Rachael Rodriguez presents with ADL impairments affecting function including balance, endurance / activity tolerance, pain, postural / trunk control, and strength. Demonstrated functioning below baseline abilities indicate the need for continued skilled intervention while inpatient. Tolerating session today without incident. Will continue to follow and progress as tolerated.      Plan/Recommendations:   Low Intensity Therapy recommended post-acute care - This is recommended as therapy feels this patient would require 2-3 visits per week. OP or HH would be the best option depending on patient's home bound status. Consider, if the patient has other  \"skilled\" needs such as wounds, IV antibiotics, etc. Combined with \"low intensity\" could also equate to a SNF. If patient is medically complex, consider LTAC.. Pt requires rolling walker at discharge. Pt indicated to therapist that they were not sure if they had a walker at home for pt and may need one ordered.      Pt desires Home with Home Health and Home with family assist at discharge. Pt cooperative; agreeable to therapeutic recommendations and plan of care.   "

## 2025-01-17 NOTE — DISCHARGE INSTRUCTIONS
Post Coronary Artery Bypass Graft (CABG) Surgery Discharge Instructions    To Accompany Standard Discharge Form      Call these physicians to schedule a follow up visit:    Cardiothoracic Surgery Phone:  [phone] Fax:  [phone] Address:  [address] [place] [address] [place] [address]       Wear your Heart Hugger continuously until your follow-up appointment with Cardiothoracic Surgery.  No lifting over 10 pounds (gallon of milk) for six (6) weeks after surgery.  No driving for four (4) weeks.    Showers are to be taken every day.    Clean all incisions with fragrance free liquid anti-bacterial soap.  No bar soap because bacteria grows on bar soap.  Please do not use any lotions or powders on incisions.  No tub baths until incisions are completely healed.   Avoid sitting for long periods of time.  Try to stand and walk every 1-2 hours when you travel or are sitting for a long time.   If your legs are swollen, elevate your legs on pillows above the level of your heart.    Rest as needed during the day, but do not take more than 2 naps during the day.   Extra naps may interfere with your ability to sleep at night.  Take your pain medications as ordered, particularly at night.    Avoid straining or bending over with your head down.    You may participate in sex when you can walk up 2 flights of stairs without shortness of breath.   Avoid positions that put pressure on your upper arms.  Do not have sex after a heavy meal or if you are tired.  Weigh yourself daily.   Weight gain can be a sign of extra fluid in your lungs and body.   Call your care giver if you have gained 2-3 pounds in one day.  Stay away from large crowds and people with colds, influenzas, or COVID.  Keep your incision clean.  Do not hold animals or small children or allow them near your incision sites to prevent infection.    Your physician will release you to return to work after your surgery.   Usually patients may return to  work six (6) weeks after surgery.    If you smoke, now is a good time to quit.  Nicotine can cause increased narrowing of the new grafts.  Call the doctor if you have any of the following:    Incisions that are red, swollen, or increasingly tender or have pus coming from them  A temperature over 101.0 degrees  Sudden trouble breathing. This could be a sign of a clot in your lung  A “pop” in your chest bone or the incision is   Increasing chest pain or pressure radiating to your neck, jaw, or arm. These are signs of a heart attack.  Weakness on one side of your body or difficulty speaking. These are signs of a stroke.    In case of an emergency, contact your physician or nurse practitioner. They will direct you regarding whether to go to the emergency room or call 911.Post Coronary Artery Bypass Graft (CABG) Surgery Discharge Instructions    To Accompany Standard Discharge Form      Call these physicians to schedule a follow up visit:    Cardiothoracic Surgery Phone:  [phone] Fax:  [phone] Address:  [address] [place] [address] [place] [facility]       Wear your Heart Hugger continuously until your follow-up appointment with Cardiothoracic Surgery.  No lifting over 10 pounds (gallon of milk) for six (6) weeks after surgery.  No driving for four (4) weeks.    Showers are to be taken every day.    Clean all incisions with fragrance free liquid anti-bacterial soap.  No bar soap because bacteria grows on bar soap.  Please do not use any lotions or powders on incisions.  No tub baths until incisions are completely healed.   Avoid sitting for long periods of time.  Try to stand and walk every 1-2 hours when you travel or are sitting for a long time.   If your legs are swollen, elevate your legs on pillows above the level of your heart.    Rest as needed during the day, but do not take more than 2 naps during the day.   Extra naps may interfere with your ability to sleep at night.  Take  your pain medications as ordered, particularly at night.    Avoid straining or bending over with your head down.    You may participate in sex when you can walk up 2 flights of stairs without shortness of breath.   Avoid positions that put pressure on your upper arms.  Do not have sex after a heavy meal or if you are tired.  Weigh yourself daily.   Weight gain can be a sign of extra fluid in your lungs and body.   Call your care giver if you have gained 2-3 pounds in one day.  Stay away from large crowds and people with colds, influenzas, or COVID.  Keep your incision clean.  Do not hold animals or small children or allow them near your incision sites to prevent infection.    Your physician will release you to return to work after your surgery.   Usually patients may return to work six (6) weeks after surgery.    If you smoke, now is a good time to quit.  Nicotine can cause increased narrowing of the new grafts.  Call the doctor if you have any of the following:    Incisions that are red, swollen, or increasingly tender or have pus coming from them  A temperature over 101.0 degrees  Sudden trouble breathing. This could be a sign of a clot in your lung  A “pop” in your chest bone or the incision is   Increasing chest pain or pressure radiating to your neck, jaw, or arm. These are signs of a heart attack.  Weakness on one side of your body or difficulty speaking. These are signs of a stroke.    In case of an emergency, contact your physician or nurse practitioner. They will direct you regarding whether to go to the emergency room or call 911.

## 2025-01-17 NOTE — PROGRESS NOTES
"S/P POD #3 elective tissue AVR/ RICH closure--Reed  EF 45-50% (BENJAMIN)    Subjective:   Patient sitting in chair after walking the unit with PT doing well, pain well controlled. Patient states she had strong muscle pain and does not want to continue the statin. She was discharged on statin (atorvastatin 20mg) and she couldn't tolerate that either. She states she takes a \"natural statin\" and she's already discussed this with cardiologist who will make a plan as outpatient.     WOA 65.6 kg -- Wt today 68.7 kg    Last 24 hrs  - Chest tubes and orellana removed yesterday  - Patient was switched to tramadol as she did not like how norco made her feel  - Diuresed yesterday  - Gabapentin started for pain  - BB started 1/16      Case Report   Surgical Pathology Report                         Case: [id number]                                   Authorizing Provider:  Zac Mcbride MD       Collected:           01/14/2025 01:37 PM           Ordering Location:     [facility]       Received:            01/14/2025 02:59 PM                                  CARDIOVASCULAR OPERATING                                                                            ROOM                                                                         Pathologist:           Rodolfo Bernal MD                                                             Specimen:    Aortic valve, aortic Valve leaflets.                                                      Final Diagnosis   Aortic valve leaflets, excision:    Benign valvular tissue with hyalinized fibrosis and dystrophic calcifications         Intake/Output Summary (Last 24 hours) at 1/17/2025 1015  Last data filed at 1/17/2025 0900  Gross per 24 hour   Intake 470 ml   Output 2650 ml   Net -2180 ml     Temp:  [97.7 °F (36.5 °C)-99.7 °F (37.6 °C)] 99.7 °F (37.6 °C)  Heart Rate:  [77-99] 94  Resp:  [15-18] 18  BP: ()/(43-76) 109/60      Results from last 7 days   Lab Units 01/17/25  0552 " 01/16/25  0357 01/15/25  0330 01/14/25  1632 01/14/25  1628 01/14/25  1450   WBC 10*3/mm3 12.57* 15.20* 14.10*  --  18.39* 10.87*   HEMOGLOBIN g/dL 10.2* 10.0* 10.8*  --  12.6 8.4*   HEMOGLOBIN, POC   --   --   --    < >  --   --    HEMATOCRIT % 31.1* 31.3* 33.1*  --  38.1 25.1*   HEMATOCRIT POC   --   --   --    < >  --   --    PLATELETS 10*3/mm3 108* 101* 108*  --  136* 130*   INR   --   --  1.38*  --  1.53* 1.81*    < > = values in this interval not displayed.     Results from last 7 days   Lab Units 01/17/25  0552 01/15/25  0806 01/15/25  0330   CREATININE mg/dL 0.58   < > 0.72   POTASSIUM mmol/L 3.9   < > 4.9   SODIUM mmol/L 137   < > 151*   MAGNESIUM mg/dL  --   --  2.8*   PHOSPHORUS mg/dL  --   --  4.2    < > = values in this interval not displayed.     Physical Exam:  Neuro intact, nad, up in recliner,  at bedside  Tele: SR 80s  Diminished bases, 97% RA  Incision c/d/i, no drainage, erythema  Benign abd, no BM  Trace LE edema    Assessment/Plan:  Principal Problem:    Nonrheumatic aortic valve stenosis  Active Problems:    Nonrheumatic aortic (valve) stenosis    - Severe aortic stenosis/ mild to moderate mitral regurgitation, EF 45-50% (BENJAMIN)--s/p elective tissue AVR (27 mm Avalus ultra Medtronic)/ left atrial appendage epicardial closure 45 mm atrial clip device (Pagni)  - Chronic HFmrEF, NYHA class III--GDMT  - Moderate to severe pHTN -- PAP 45/28, mean 36 on RHC  - HTN with grade II diastolic dysfunction  - HLD -- Statin Intolerance  - Postop leukocytosis, likely reactive--pulm toileting  - Postop ABLA, expected--watch closely  - Postop TCP, consumptive--watch closely  - Postop hypernatremia r/t intravascularly dry--encourage oral fluids      POD #3  - Patient progressing well  - Will decrease tramadol per patient request  - Continue ASA/BB  - Stop statin due to intolerance and myalgias  - Diurese lasix 40 mg IV x 1 + potassium replacement  - D/C wires  - Encourage IS and ambulation  - Will order  overnight oximetry given patient was on 4L overnight    Routine care--as above  D/w pt/family, nsg, Dr. Cleaning  Anticipate home at discharge      Gris Willis, APRN  1/17/2025  10:15 EST

## 2025-01-17 NOTE — DISCHARGE SUMMARY
Date of Admission: 1/14/2025  Date of Discharge:  1/17/2025    Discharge Diagnosis:   - Severe aortic stenosis/ mild to moderate mitral regurgitation, EF 45-50% (BENJAMIN)--s/p elective tissue AVR (27 mm Avalus ultra Medtronic)/ left atrial appendage epicardial closure 45 mm atrial clip device (Reed)  - Chronic HFmrEF, NYHA class III--GDMT  - Moderate to severe pHTN -- PAP 45/28, mean 36 on RHC  - HTN with grade II diastolic dysfunction  - HLD -- Statin Intolerance  - Postop leukocytosis, likely reactive--pulm toileting  - Postop ABLA, expected--watch closely  - Postop TCP, consumptive--watch closely  - Postop hypernatremia r/t intravascularly dry--encourage oral fluids    Presenting Problem/History of Present Illness  Nonrheumatic aortic valve stenosis [I35.0]  Nonrheumatic aortic (valve) stenosis [I35.0]     Hospital Course  Patient is a 69 y.o. female who was admitted on 1/14 and on that same day underwent  Elective AVR with a 27 mm Avalus ultra Medtronic pericardial prosthesis, Left atrial appendage epicardial closure with a 45 mm atrial clip device with Dr. Mcbride (see op-note for more detail). Operation went well and after, patient was transferred to [facility] in stable condition where she was later extubated. POD1, swan and arterial line removed, patient given albumin, and patient was de-escalated. POD2, beta blocker stared, patient diuresed. Central line, chest tubes, and orellana discontinued. POD3, statin discontinued due to myalgias, patient IV diuresed, AV wires removed. Overnight oximetry ordered and showed desaturation overnight. On POD #4 patient was deemed ready for discharge home with family assist. Patient educated on sternal precautions and signs of infection. Patient to follow up in 2-3 weeks with appointment listed below.     Procedures Performed  Procedure(s):  AORTIC VALVE REPLACEMENT with 23mm Avalus Valve  TRANSESOPHAGEAL ECHOCARDIOGRAM WITH ANESTHESIA       Consults:   Consults       Date and Time Order  Name Status Description    1/14/2025  3:42 PM Inpatient Cardiology Consult      12/28/2024 11:36 AM Inpatient Cardiology Consult Completed             Pertinent Test Results:    Lab Results   Component Value Date    WBC 12.57 (H) 01/17/2025    HGB 10.2 (L) 01/17/2025    HCT 31.1 (L) 01/17/2025    MCV 85.4 01/17/2025     (L) 01/17/2025      Lab Results   Component Value Date    GLUCOSE 102 (H) 01/17/2025    CALCIUM 9.3 01/17/2025     01/17/2025    K 3.9 01/17/2025    CO2 23.8 01/17/2025     01/17/2025    BUN 14 01/17/2025    CREATININE 0.58 01/17/2025    BCR 24.1 01/17/2025    ANIONGAP 9.2 01/17/2025     Lab Results   Component Value Date    INR 1.38 (H) 01/15/2025    PROTIME 17.1 (H) 01/15/2025         Condition on Discharge: Stable     Vital Signs  Temp:  [98 °F (36.7 °C)-99.7 °F (37.6 °C)] 98 °F (36.7 °C)  Heart Rate:  [77-99] 88  Resp:  [15-21] 21  BP: ()/(43-73) 111/60      Discharge Disposition      Discharge Medications     Discharge Medications        ASK your doctor about these medications        Instructions Start Date   atorvastatin 20 MG tablet  Commonly known as: LIPITOR   20 mg, Oral, Nightly      melatonin 5 MG tablet tablet   Nightly PRN      metoprolol succinate XL 25 MG 24 hr tablet  Commonly known as: TOPROL-XL   12.5 mg, Oral, Every 24 Hours Scheduled      mupirocin 2 % ointment  Commonly known as: BACTROBAN   Apply to nares (inside each nostril) twice daily as directed for procedure               Discharge Diet: Heart healthy     Activity at Discharge:   1. No driving until seen in office and off narcotic pain medications.  2. Shower daily. Clean incisions with warm water and antibacterial soap only. Do not put any lotion or ointments on incisions.  3. Ambulate for 10 minutes at least 3 times a day.  4. No heavy lifting > 10lbs until seen in office.   5. Take all medications as prescribed.      Follow-up Appointments  Future Appointments   Date Time Provider Department  Center   2/6/2025  1:30 PM Rhys-Thalia Jaimes, APRN [facility]       Test Results Pending at Discharge       Wilmer Chung PA-C  01/17/25  15:26 EST

## 2025-01-17 NOTE — THERAPY TREATMENT NOTE
Subjective: Pt agreeable to therapeutic plan of care.  Cognition: oriented to Person, Place, Time, and Situation, arousal/alertness: Alert, Attentive, and Inquisitive, safety/judgement: good, and awareness of deficits: good awareness of safety precautions and good awareness of deficits    Objective:   Patient recalls 3/3 sternal precautions, requiring no verbal cuing for precautions.     Phase 1 Cardiac Rehab Initiated - Acute Care    Cardiac Level III Activities  Sitting tolerance: 5-10min  Standing tolerance: >10min    Precautions:  Mid-sternal incision; avoid scapular retraction and depression.  Cardiovascular impairment post-sx; encourage energy conservation strategies.    Therapeutic Exercise: 10 reps UE and LE AROM in  recliner . Tolerated well with education in modification for painful movement with full extension of UE when completing arm circles, etc. Graded by bending arms at elbow to decrease resistance while still completing activity. Pt demos  understanding.     MET level equivalent: 2.0-3.0 (Unlimited sitting, ambulation on level ground <2mph, light housework)      Bed Mobility: Mod-A and Assist x 2 - assist required for sit to supine at end of session per request from nursing. Pt up in chair at start of session.   Functional Transfers: SBA and CGA     Balance: supported and standing SBA and CGA  Functional Ambulation: Supervision and SBA    Upper Body Dressing and Lower Body Dressing: Min-A  ADL Position:  seated and standing  ADL Comments: Educated pt and spouse in adaptive techniques for UB dressing and provided visual demonstration. Both articulated understanding. Completed UB Dressing to don gown as robe with min A while standing.     Bathing: SBA  ADL Comments: Educated pt on precautions for bathing with regards to hygiene around sternal incisions. Pt articulates understanding.     Toileting: SBA  ADL Comments: Pt completed toileting with SBA for toilet transfer with good adherence to  "precautions. Educated pt on strategies for hygiene while maintaining precautions with pt able to complete hygiene with edcuation, cues and SBA.       Vitals: WNL    Pain: 6 VAS  Location: sternum, generalized  Interventions for pain: Repositioned, Increased Activity, and Therapeutic Presence  Education: Provided education on the importance of mobility in the acute care setting, Verbal/Tactile Cues, ADL training, Transfer Training, Energy conservation strategies, and HEP      Assessment: Rachael Rodriguez presents with ADL impairments affecting function including balance, endurance / activity tolerance, pain, postural / trunk control, and strength. Demonstrated functioning below baseline abilities indicate the need for continued skilled intervention while inpatient. Tolerating session today without incident. Will continue to follow and progress as tolerated.     Plan/Recommendations:   Low Intensity Therapy recommended post-acute care - This is recommended as therapy feels this patient would require 2-3 visits per week. OP or HH would be the best option depending on patient's home bound status. Consider, if the patient has other  \"skilled\" needs such as wounds, IV antibiotics, etc. Combined with \"low intensity\" could also equate to a SNF. If patient is medically complex, consider LTAC.. Pt requires rolling walker at discharge. Pt indicated to therapist that they were not sure if they had a walker at home for pt and may need one ordered.     Pt desires Home with Home Health and Home with family assist at discharge. Pt cooperative; agreeable to therapeutic recommendations and plan of care.     Modified Lisbeth: N/A = No pre-op stroke/TIA    Post-Tx Position: Supine with HOB Elevated and Staff Present  PPE: gloves and surgical mask    Therapy Charges for Today       Code Description Service Date Service Provider Modifiers Qty    06540016872 HC OT SELF CARE/MGMT/TRAIN EA 15 MIN 1/17/2025 Fernanda Saini OT GO 2    " [id number] HC OT THERAPEUTIC ACT EA 15 MIN 1/17/2025 Fernanda Saini OT GO 1    20968471127 HC CAREGIVER TRAINING STRATEGIES &TQ EA ADDL 15 MIN 1/17/2025 Fernanda Saini OT  1    [id number] HC OT THERAPEUTIC ACT EA 15 MIN 1/17/2025 Fernanda Saini OT GO 1    [id number] HC OT SELF CARE/MGMT/TRAIN EA 15 MIN 1/17/2025 Fernanda Saini OT GO 2    [id number] HC CAREGIVER TRAINING STRATEGIES &TQ EA ADDL 15 MIN 1/17/2025 Fernanda Saini OT  1           Time Calculation- OT       Row Name 01/17/25 1150             Time Calculation- OT    OT Start Time 1004  -KT      OT Stop Time 1043  -KT      OT Time Calculation (min) 39 min  -KT      Total Timed Code Minutes- OT 39 minute(s)  -KT      OT Received On 01/17/25  -KT      OT - Next Appointment 01/20/25  -KT                User Key  (r) = Recorded By, (t) = Taken By, (c) = Cosigned By      Initials Name Provider Type    KT Fernanda Saini OT Occupational Therapist

## 2025-01-17 NOTE — PROGRESS NOTES
Cardiology [facility]      Patient Care Team:  Alvaro Cardona MD as PCP - General (General Surgery)  Provider, No Known as PCP - Family Medicine         Cardiology assessment and plan        Severe aortic stenosis status post arctic valve replacement surgery with [employer] pericardial prosthesis 27 mm  Atrial appendage closure with a atrial clip  Pulmonary hypertension  Diastolic heart failure with LV ejection fraction of 45 to 50%  Hypertension  Hyperlipidemia  Normal thyroid function  Moderate to severe pulmonary hypertension  Mild mitral regurgitation  Cardiac catheterization with normal coronaries and moderate pulmonary hypertension  Patient is currently being paced and underlying rhythm is sinus rhythm with a left bundle branch block  Postop day 3  Patient is currently being paced  Hemodynamics are stable  Alert awake  Denies any new complaint  Denies any new cardiac symptoms  Denies any new complaints  Complaining of some chest wall pain from surgery  Tmax is 99.7 pulse is 90 respirations are 18 blood pressure is 108/60 sats are 97%  Sodium is 137 potassium is 3.9 creatinine is 0.58 white cell count is 12,000 hemoglobin is 10.2  Current medications include aspirin 81 mg p.o. once a day patient is on metoprolol 12.5 mg p.o. twice daily patient is on Lovenox for DVT prophylaxis  Statin is discontinued secondary to muscle cramps and muscle pain  Patient is on Lovenox for DVT prophylaxis  Discussed with patient and family at bedside  Chest x-ray with no pneumothorax  Patient is clinically hemodynamically stable  Continue postsurgical care  Discussed with nursing staff  Continue current medical management  Further recommendation based on patient course                      Chief Complaint: Shortness of breath    Subjective no new complaints    Interval History: No significant change in overall status    History taken from: patient    Review of Systems:  Review of Systems   Constitutional: Negative for chills,  "decreased appetite and malaise/fatigue.   HENT:  Negative for congestion and nosebleeds.    Eyes:  Negative for blurred vision and double vision.   Cardiovascular:  Negative for chest pain, dyspnea on exertion, irregular heartbeat, leg swelling, near-syncope, orthopnea, palpitations and paroxysmal nocturnal dyspnea.   Respiratory:  Negative for cough and shortness of breath.    Hematologic/Lymphatic: Negative for adenopathy. Does not bruise/bleed easily.   Skin:  Negative for color change and rash.   Musculoskeletal:  Negative for back pain and joint pain.   Gastrointestinal:  Negative for bloating, abdominal pain, hematemesis and hematochezia.   Genitourinary:  Negative for flank pain and hematuria.   Neurological:  Negative for dizziness and focal weakness.   Psychiatric/Behavioral:  Negative for altered mental status. The patient does not have insomnia.        Objective    Vital Signs  Visit Vitals  /61 (BP Location: Left arm, Patient Position: Lying)   Pulse 91   Temp 99.7 °F (37.6 °C) (Oral)   Resp 18   Ht 144.8 cm (57\")   Wt 68.8 kg (151 lb 9.6 oz)   SpO2 97%   BMI 32.81 kg/m²     Oxygen Therapy  SpO2: 97 %  Pulse Oximetry Type: Continuous  Device (Oxygen Therapy): nasal cannula  Flow (L/min) (Oxygen Therapy): 4  Oxygen Concentration (%): 40  Flowsheet Rows      Flowsheet Row First Filed Value   Admission Height 144.8 cm (57\") Documented at 01/14/2025 0646   Admission Weight 65.7 kg (144 lb 12.8 oz) Documented at 01/14/2025 0646          Intake & Output (last 3 days)         01/12 0701  01/13 0700 01/13 0701  01/14 0700 01/14 0701  01/15 0700 01/15 0701  01/16 0700    P.O.   300 720    I.V. (mL/kg)   1993 (30)     Blood   600     IV Piggyback   1150     Total Intake(mL/kg)   4043 (60.9) 720 (10.8)    Urine (mL/kg/hr)   2380 (1.5) 220 (0.3)    Blood   500     Chest Tube   149 100    Dialysis   1900     Total Output   4929 320    Net   -886 +400            Urine Unmeasured Occurrence   2 x           Lines, " Drains & Airways       Active LDAs       Name Placement date Placement time Site Days    CVC Double Lumen 01/14/25 Right Internal jugular 01/14/25  1323  created via procedure documentation  Internal jugular  1    Urethral Catheter Temperature probe 16 Fr. 01/14/25  --  -- 1    Y Chest Tube 1 and 2 Mediastinal 28 Fr. Mediastinal 28 Fr. 01/14/25  --  -- 1    Pacer Wires 01/14/25  --  Atrial and Ventricular  1                    Physical Exam:  Constitutional:       Appearance: Well-developed.   Eyes:      Conjunctiva/sclera: Conjunctivae normal.      Pupils: Pupils are equal, round, and reactive to light.   HENT:      Head: Normocephalic and atraumatic.   Neck:      Thyroid: No thyromegaly.   Pulmonary:      Effort: Pulmonary effort is normal.      Breath sounds: Normal breath sounds. Examination of the right-lower field reveals decreased breath sounds. Examination of the left-lower field reveals decreased breath sounds.   Cardiovascular:      Normal rate. Regular rhythm.   Pulses:     Intact distal pulses.   Edema:     Peripheral edema absent.   Abdominal:      General: Bowel sounds are normal.      Palpations: Abdomen is soft.   Musculoskeletal:      Cervical back: Normal range of motion and neck supple. Skin:     General: Skin is warm.   Neurological:      Mental Status: Alert and oriented to person, place, and time.     Extubated  Alert and awake  Chest tubes are in place  Currently being paced    Results Review:     I reviewed the patient's new clinical results.    Lab Results (last 24 hours)       Procedure Component Value Units Date/Time    POC Glucose 4x Daily Before Meals & at Bedtime [[id number]]  (Normal) Collected: 01/17/25 0729    Specimen: Blood Updated: 01/17/25 0732     Glucose 96 mg/dL      Comment: Serial Number: [id number]:  [id number]       Basic Metabolic Panel [[id number]]  (Abnormal) Collected: 01/17/25 0552    Specimen: Blood from Arm, Left Updated: 01/17/25 0620     Glucose 102 mg/dL       BUN 14 mg/dL      Creatinine 0.58 mg/dL      Sodium 137 mmol/L      Potassium 3.9 mmol/L      Chloride 104 mmol/L      CO2 23.8 mmol/L      Calcium 9.3 mg/dL      BUN/Creatinine Ratio 24.1     Anion Gap 9.2 mmol/L      eGFR 98.7 mL/min/1.73     Narrative:      GFR Categories in Chronic Kidney Disease (CKD)      GFR Category          GFR (mL/min/1.73)    Interpretation  G1                     90 or greater         Normal or high (1)  G2                      60-89                Mild decrease (1)  G3a                   45-59                Mild to moderate decrease  G3b                   30-44                Moderate to severe decrease  G4                    15-29                Severe decrease  G5                    14 or less           Kidney failure          (1)In the absence of evidence of kidney disease, neither GFR category G1 or G2 fulfill the criteria for CKD.    eGFR calculation 2021 CKD-EPI creatinine equation, which does not include race as a factor    CBC (No Diff) [[id number]]  (Abnormal) Collected: 01/17/25 0552    Specimen: Blood from Arm, Left Updated: 01/17/25 0601     WBC 12.57 10*3/mm3      RBC 3.64 10*6/mm3      Hemoglobin 10.2 g/dL      Hematocrit 31.1 %      MCV 85.4 fL      MCH 28.0 pg      MCHC 32.8 g/dL      RDW 14.3 %      RDW-SD 44.3 fl      MPV 10.7 fL      Platelets 108 10*3/mm3      Comment: Result checked         POC Glucose Once [[id number]]  (Abnormal) Collected: 01/16/25 2020    Specimen: Blood Updated: 01/16/25 2023     Glucose 125 mg/dL      Comment: Serial Number: [id number]:  [id number]       POC Glucose Once [[id number]]  (Abnormal) Collected: 01/16/25 1636    Specimen: Blood Updated: 01/16/25 1638     Glucose 137 mg/dL      Comment: Serial Number: [id number]:  [id number]       POC Glucose Once [[id number]]  (Abnormal) Collected: 01/16/25 1130    Specimen: Blood Updated: 01/16/25 1131     Glucose 121 mg/dL      Comment: Serial Number: [id number]:  [id number]        Tissue Pathology Exam [[id number]] Collected: 01/14/25 1337    Specimen: Tissue from Aortic valve Updated: 01/16/25 1012     Case Report --     Surgical Pathology Report                         Case: [id number]                                  Authorizing Provider:  Zac Mcbride MD       Collected:           01/14/2025 01:37 PM          Ordering Location:     [facility]       Received:            01/14/2025 02:59 PM                                 CARDIOVASCULAR OPERATING                                                                            ROOM                                                                         Pathologist:           Rodolfo Bernal MD                                                             Specimen:    Aortic valve, aortic Valve leaflets.                                                        Final Diagnosis --     Aortic valve leaflets, excision:    Benign valvular tissue with hyalinized fibrosis and dystrophic calcifications    JPR       Gross Description --     1. Aortic valve.  Received in formalin labeled aortic valve leaflets are 2 semilunar whitish-tan fragments of glistening soft focally calcified tissue consistent with cardiac leaflets, 0.7 x 2.5 x 0.5 cm.  Representative sections are submitted following brief decalcification.    [place]              Results for orders placed during the hospital encounter of 12/28/24    Adult Transthoracic Echo Complete W/ Cont if Necessary Per Protocol    Interpretation Summary    Left ventricular ejection fraction appears to be 46 - 50%.    Left ventricular diastolic function is consistent with (grade II w/high LAP) pseudonormalization.    The left atrial cavity is moderate to severely dilated.    Severe aortic valve stenosis is present.    Moderate mitral valve regurgitation is present.    Estimated right ventricular systolic pressure from tricuspid regurgitation is moderately elevated (45-55 mmHg).    Moderate to severe pulmonary  hypertension is present.        Medication Review:   I have reviewed the patient's current medication list  Scheduled Meds:aspirin, 81 mg, Oral, Daily  [Held by provider] atorvastatin, 40 mg, Oral, Nightly  chlorhexidine, 15 mL, Mouth/Throat, Q12H  enoxaparin, 40 mg, Subcutaneous, Q24H  gabapentin, 100 mg, Oral, TID  insulin lispro, 2-9 Units, Subcutaneous, 4x Daily AC & at Bedtime  metoprolol tartrate, 12.5 mg, Oral, Q12H  mupirocin, 1 Application, Each Nare, BID  polyethylene glycol, 17 g, Oral, BID  senna-docusate sodium, 2 tablet, Oral, BID      Continuous Infusions:     PRN Meds:.  acetaminophen **OR** acetaminophen **OR** acetaminophen    bisacodyl    bisacodyl    Calcium Replacement - Follow Nurse / BPA Driven Protocol    cyclobenzaprine    dextrose    dextrose    glucagon (human recombinant)    Magnesium Cardiology Dose Replacement - Follow Nurse / BPA Driven Protocol    magnesium hydroxide    Morphine **AND** naloxone    nitroglycerin    ondansetron    Phosphorus Replacement - Follow Nurse / BPA Driven Protocol    Potassium Replacement - Follow Nurse / BPA Driven Protocol    traMADol    ECG/EMG Results (last 24 hours)       Procedure Component Value Units Date/Time    ECG 12 Lead Other; post op [[id number]] Collected: 01/14/25 1756     Updated: 01/14/25 2047     QT Interval 449 ms      QTC Interval 496 ms     Narrative:      HEART RATE=73  bpm  RR Znsclmqx=538  ms  ND Irochcpg=038  ms  P Horizontal Axis=7  deg  P Front Axis=71  deg  QRSD Nvsxuinq=256  ms  QT Mqhbatfg=318  ms  TGwU=807  ms  QRS Axis=60  deg  T Wave Axis=190  deg  - ABNORMAL ECG -  Sinus rhythm  Left bundle branch block  ST elevation secondary to IVCD  When compared with ECG of 13-Jan-2025 08:00:09,  No significant change  Electronically Signed By: Makayla Driscoll (PREETI) 2025-01-14 20:44:20  Date and Time of Study:2025-01-14 17:56:05    ECG 12 Lead Other; post op [[id number]] Collected: 01/15/25 0309     Updated: 01/15/25 0309     QT  Interval 447 ms      QTC Interval 484 ms     Narrative:      HEART RATE=74  bpm  RR Awopuafa=809  ms  WA Bbkzfyfe=927  ms  P Horizontal Axis=  deg  P Front Axis=47  deg  QRSD Tpilywtj=046  ms  QT Iilfocnm=829  ms  OFfC=626  ms  QRS Axis=38  deg  T Wave Axis=138  deg  - ABNORMAL ECG -  Sinus rhythm  Atrial premature complexes  Probable left atrial enlargement  Left bundle branch block  ST elevation secondary to IVCD  Date and Time of Study:2025-01-15 03:09:08            Imaging Results (Last 24 Hours)       Procedure Component Value Units Date/Time    XR Chest 1 View [[id number]] Collected: 01/16/25 1458     Updated: 01/16/25 1502    Narrative:      XR CHEST 1 VW    Date of Exam: 1/16/2025 2:50 PM EST    Indication: chest tube removal    Comparison: Chest radiograph performed on January 16, 2025    Findings:  Interval removal of left-sided chest tubes and right IJ central venous catheter. No evidence of pneumothorax. Right lower lobe linear atelectasis visualized. Mild atelectasis in the left lung base is visualized. There is no evidence of pneumothorax.   Patient is post sternotomy. Cardiac silhouette is enlarged. No acute osseous abnormality identified.      Impression:      Impression:  Interval removal of left-sided chest tubes and right IJ central venous catheter. No pneumothorax.    Mild right lower lobe linear atelectasis and atelectasis along the left lung base.      Electronically Signed: Donn Cartagena MD    1/16/2025 3:00 PM EST    Workstation ID: [id number]          Results for orders placed during the hospital encounter of 12/28/24    Cardiac Catheterization/Vascular Study    Conclusion  Table formatting from the original result was not included.  Cardiac Catheterization Operative Report    Rachael Rodriguez  [id number]  12/30/2024  @PCP@    She underwent cardiac catheterization.    Indications for the procedure include: Valvular heart disease with severe aortic stenosis.    Results for orders placed during  the hospital encounter of 12/28/24    Adult Transthoracic Echo Complete W/ Cont if Necessary Per Protocol    Interpretation Summary    Left ventricular ejection fraction appears to be 46 - 50%.    Left ventricular diastolic function is consistent with (grade II w/high LAP) pseudonormalization.    The left atrial cavity is moderate to severely dilated.    Severe aortic valve stenosis is present.    Moderate mitral valve regurgitation is present.    Estimated right ventricular systolic pressure from tricuspid regurgitation is moderately elevated (45-55 mmHg).    Moderate to severe pulmonary hypertension is present.    No results found for this or any previous visit.        Procedure Details:  The risks, benefits, complications, treatment options, and expected outcomes were discussed with the patient. The patient and/or family concurred with the proposed plan, giving informed consent.    After informed consent the patient was brought to the cath lab after appropriate IV hydration was begun and oral premedication was given. She was further sedated with fentanyl. She was prepped and draped in the usual manner. Using the modified Seldinger access technique, a 6 Fijian sheath was placed in the femoral artery. A left heart catheterization with coronary arteriography was performed. Findings are discussed below.    7 Fijian venous sheath was placed in the right femoral vein using ultrasound guidance and then we used a 7 Fijian chest tube [place]-Oscar catheter to the right heart catheterization and hemodynamic pressure measurements.    After the procedure was completed, sedation was stopped and the sheaths and catheters were all removed. Hemostasis was achieved per established hospital protocols.    Conscious sedation:  Conscious sedation was performed according to protocol.  I supervised and directed an independent trained observer with the assistance of monitoring the patient's level of consciousness and physiologic status  throughout the procedure.  Intraoperative service time was 90 minutes.    Findings:    Hemodynamics Central aortic pressure systolic 146 diastolic 88 with a mean pressure of 150 mmHg      Right heart catheterization with hemodynamics      Pulmonary capillary wedge pressure was 20 mmHg  PA pressure systolic 45 diastolic 28 with a mean pressure of 36 mmHg  RV pressure systolic 40 mmHg  Right atrial pressure was 8 mmHg  PA saturation was 73%  Right atrial saturation was of 69%  Aortic saturation was 96%  Silvia equation cardiac output was 4.3 L/min  Silvia equation cardiac index was 2.6 L/min/m²  Pulmonary vascular resistance was 239 dynes per second  Systemic vascular resistance was 2002 dynes per second  Left Main Large-caliber vessel angiographically normal bifurcates into left anterior descending and left circumflex arteries  RCA Large-caliber vessel angiographically normal  LAD Large-caliber vessel angiographically normal  Circ Large-caliber vessel angiographically normal  LV Not done  Coronary Dominance Right coronary artery    Estimated Blood Loss:  Minimal    Specimens: None    Complications:  None; patient tolerated the procedure well.    Disposition: PACU - hemodynamically stable.    Condition: stable    Impressions:  Normal coronaries  Moderate pulmonary hypertension    Recommendations:  Further evaluation for TAVR versus surgical AVR     Results for orders placed during the hospital encounter of 12/28/24    Adult Transthoracic Echo Complete W/ Cont if Necessary Per Protocol    Interpretation Summary    Left ventricular ejection fraction appears to be 46 - 50%.    Left ventricular diastolic function is consistent with (grade II w/high LAP) pseudonormalization.    The left atrial cavity is moderate to severely dilated.    Severe aortic valve stenosis is present.    Moderate mitral valve regurgitation is present.    Estimated right ventricular systolic pressure from tricuspid regurgitation is moderately elevated  (45-55 mmHg).    Moderate to severe pulmonary hypertension is present.     Lab Results   Component Value Date    GLUCOSE 102 (H) 01/17/2025    BUN 14 01/17/2025    CREATININE 0.58 01/17/2025    EGFR 98.7 01/17/2025    BCR 24.1 01/17/2025    K 3.9 01/17/2025    CO2 23.8 01/17/2025    CALCIUM 9.3 01/17/2025    ALBUMIN 4.8 01/15/2025    BILITOT 0.4 01/13/2025    AST 25 01/13/2025    ALT 16 01/13/2025      Lab Results   Component Value Date    CHOL 254 (H) 01/13/2025    TRIG 114 01/13/2025    HDL 78 (H) 01/13/2025     (H) 01/13/2025      Lab Results   Component Value Date    TROPONINT 16 (H) 12/28/2024        Assessment & Plan       Nonrheumatic aortic valve stenosis    Nonrheumatic aortic (valve) stenosis        Makayla Driscoll MD  01/17/25  08:50 EST

## 2025-01-17 NOTE — CASE MANAGEMENT/SOCIAL WORK
Continued Stay Note  [facility]     Patient Name: Rachael Rodriguez  MRN: [id number]  Today's Date: 1/17/2025    Admit Date: 1/14/2025    Plan: DC Plan: Home with family assist. Watch for Home Oxygen need. AVR 1/14/2025   Discharge Plan       Row Name 01/17/25 1339       Plan    Plan DC Plan: Home with family assist. Watch for Home Oxygen need. AVR 1/14/2025    Patient/Family in Agreement with Plan yes    Provided Post Acute Provider List? N/A    Provided Post Acute Provider Quality & Resource List? N/A    Plan Comments CM reviewed chart documentation for clinical updates. Plan to diurese today. Will have overnight oximetry tonight.CM will continue to follow for any additional needs and adjust DC plan accordingly. DC Barriers: POD 3 OHS, Cardiac monitoring, pending overnight ox, and monitor labs.               Expected Discharge Date and Time       Expected Discharge Date Expected Discharge Time    Jan 18, 2025               Nataly Gauthier RN    Office Phone: [phone]  Office Cell:     [phone]

## 2025-01-18 ENCOUNTER — READMISSION MANAGEMENT (OUTPATIENT)
Dept: CALL CENTER | Facility: HOSPITAL | Age: 70
End: 2025-01-18
Payer: MEDICARE

## 2025-01-18 VITALS
DIASTOLIC BLOOD PRESSURE: 81 MMHG | SYSTOLIC BLOOD PRESSURE: 132 MMHG | HEART RATE: 94 BPM | TEMPERATURE: 98.7 F | RESPIRATION RATE: 24 BRPM | WEIGHT: 150 LBS | HEIGHT: 57 IN | BODY MASS INDEX: 32.36 KG/M2 | OXYGEN SATURATION: 95 %

## 2025-01-18 LAB
ANION GAP SERPL CALCULATED.3IONS-SCNC: 10.5 MMOL/L (ref 5–15)
BUN SERPL-MCNC: 16 MG/DL (ref 8–23)
BUN/CREAT SERPL: 27.6 (ref 7–25)
CALCIUM SPEC-SCNC: 9.3 MG/DL (ref 8.6–10.5)
CHLORIDE SERPL-SCNC: 104 MMOL/L (ref 98–107)
CO2 SERPL-SCNC: 25.5 MMOL/L (ref 22–29)
CREAT SERPL-MCNC: 0.58 MG/DL (ref 0.57–1)
DEPRECATED RDW RBC AUTO: 43.7 FL (ref 37–54)
EGFRCR SERPLBLD CKD-EPI 2021: 98.7 ML/MIN/1.73
ERYTHROCYTE [DISTWIDTH] IN BLOOD BY AUTOMATED COUNT: 14.2 % (ref 12.3–15.4)
GLUCOSE BLDC GLUCOMTR-MCNC: 92 MG/DL (ref 70–105)
GLUCOSE BLDC GLUCOMTR-MCNC: 95 MG/DL (ref 70–105)
GLUCOSE SERPL-MCNC: 89 MG/DL (ref 65–99)
HCT VFR BLD AUTO: 29.1 % (ref 34–46.6)
HGB BLD-MCNC: 9.5 G/DL (ref 12–15.9)
MCH RBC QN AUTO: 27.6 PG (ref 26.6–33)
MCHC RBC AUTO-ENTMCNC: 32.6 G/DL (ref 31.5–35.7)
MCV RBC AUTO: 84.6 FL (ref 79–97)
PLATELET # BLD AUTO: 116 10*3/MM3 (ref 140–450)
PMV BLD AUTO: 10.5 FL (ref 6–12)
POTASSIUM SERPL-SCNC: 3.6 MMOL/L (ref 3.5–5.2)
RBC # BLD AUTO: 3.44 10*6/MM3 (ref 3.77–5.28)
SODIUM SERPL-SCNC: 140 MMOL/L (ref 136–145)
WBC NRBC COR # BLD AUTO: 8.77 10*3/MM3 (ref 3.4–10.8)

## 2025-01-18 PROCEDURE — 99232 SBSQ HOSP IP/OBS MODERATE 35: CPT | Performed by: INTERNAL MEDICINE

## 2025-01-18 PROCEDURE — 80048 BASIC METABOLIC PNL TOTAL CA: CPT | Performed by: NURSE PRACTITIONER

## 2025-01-18 PROCEDURE — 94618 PULMONARY STRESS TESTING: CPT

## 2025-01-18 PROCEDURE — 85027 COMPLETE CBC AUTOMATED: CPT | Performed by: NURSE PRACTITIONER

## 2025-01-18 PROCEDURE — 82948 REAGENT STRIP/BLOOD GLUCOSE: CPT | Performed by: NURSE PRACTITIONER

## 2025-01-18 RX ORDER — POTASSIUM CHLORIDE 1500 MG/1
40 TABLET, EXTENDED RELEASE ORAL EVERY 4 HOURS
Status: DISCONTINUED | OUTPATIENT
Start: 2025-01-18 | End: 2025-01-18

## 2025-01-18 RX ORDER — CYCLOBENZAPRINE HCL 5 MG
5 TABLET ORAL 3 TIMES DAILY PRN
Qty: 9 TABLET | Refills: 0 | Status: SHIPPED | OUTPATIENT
Start: 2025-01-18 | End: 2025-01-21

## 2025-01-18 RX ORDER — ASPIRIN 81 MG/1
81 TABLET ORAL DAILY
Qty: 30 TABLET | Refills: 2 | Status: SHIPPED | OUTPATIENT
Start: 2025-01-18 | End: 2025-04-18

## 2025-01-18 RX ORDER — ACETAMINOPHEN 325 MG/1
650 TABLET ORAL EVERY 6 HOURS PRN
Qty: 30 TABLET | Refills: 0 | Status: SHIPPED | OUTPATIENT
Start: 2025-01-18

## 2025-01-18 RX ORDER — POTASSIUM CHLORIDE 1.5 G/1.58G
40 POWDER, FOR SOLUTION ORAL EVERY 4 HOURS
Status: COMPLETED | OUTPATIENT
Start: 2025-01-18 | End: 2025-01-18

## 2025-01-18 RX ORDER — METOPROLOL TARTRATE 25 MG/1
12.5 TABLET, FILM COATED ORAL EVERY 12 HOURS SCHEDULED
Qty: 90 TABLET | Refills: 0 | Status: SHIPPED | OUTPATIENT
Start: 2025-01-18

## 2025-01-18 RX ORDER — TRAMADOL HYDROCHLORIDE 50 MG/1
25 TABLET ORAL EVERY 12 HOURS PRN
Qty: 3 TABLET | Refills: 0 | Status: SHIPPED | OUTPATIENT
Start: 2025-01-18 | End: 2025-01-20

## 2025-01-18 RX ADMIN — POTASSIUM CHLORIDE 40 MEQ: 1.5 POWDER, FOR SOLUTION ORAL at 07:30

## 2025-01-18 RX ADMIN — CHLORHEXIDINE GLUCONATE, 0.12% ORAL RINSE 15 ML: 1.2 SOLUTION DENTAL at 08:26

## 2025-01-18 RX ADMIN — Medication 12.5 MG: at 08:26

## 2025-01-18 RX ADMIN — ASPIRIN 81 MG: 81 TABLET, COATED ORAL at 08:26

## 2025-01-18 RX ADMIN — POTASSIUM CHLORIDE 40 MEQ: 1.5 POWDER, FOR SOLUTION ORAL at 11:17

## 2025-01-18 RX ADMIN — GABAPENTIN 100 MG: 100 CAPSULE ORAL at 08:26

## 2025-01-18 NOTE — CASE MANAGEMENT/SOCIAL WORK
Continued Stay Note  [facility]     Patient Name: Rachael Rodriguez  MRN: [id number]  Today's Date: 1/18/2025    Admit Date: 1/14/2025    Plan: Home with spouse. New RW and nocturnal 02 1L through [facility]. Spouse to transport.   Discharge Plan       Row Name 01/18/25 1616       Plan    Plan Home with spouse. New RW and nocturnal 02 1L through [facility]. Spouse to transport.    Patient/Family in Agreement with Plan yes    Plan Comments Pt passed walking oximetry. Pt overnight oximetry qualifed the pt for 1L 02. Order and verbiage in place. Referral to [facility] and accepted. [facility] to deliver to home tonight. Pt requesting RW for dc. RW ordered and delivered to pt at spouse at bedside. RN updated.             Joellen Chopra RN BSN  Weekend   [facility]  Phone: [phone]  Fax: [phone]

## 2025-01-18 NOTE — PROGRESS NOTES
Cardiology [facility]      Patient Care Team:  Alvaro Cardona MD as PCP - General (General Surgery)  Provider, No Known as PCP - Family Medicine         Cardiology assessment and plan        Severe aortic stenosis status post arctic valve replacement surgery with [employer] pericardial prosthesis 27 mm  Atrial appendage closure with a atrial clip  Pulmonary hypertension  Diastolic heart failure with LV ejection fraction of 45 to 50%  Hypertension  Hyperlipidemia  Normal thyroid function  Moderate to severe pulmonary hypertension  Mild mitral regurgitation  Cardiac catheterization with normal coronaries and moderate pulmonary hypertension  Patient is currently being paced and underlying rhythm is sinus rhythm with a left bundle branch block  Postop day 4  Patient is currently being paced  Hemodynamics are stable  Alert awake  Denies any new complaint  Denies any new cardiac symptoms  Denies any new complaints  Complaining of some chest wall pain from surgery  Tmax is 99.7 pulse is 90 respirations are 18 blood pressure is 108/60 sats are 97%  Sodium is 137 potassium is 3.9 creatinine is 0.58 white cell count is 12,000 hemoglobin is 10.2  Current medications include aspirin 81 mg p.o. once a day patient is on metoprolol 12.5 mg p.o. twice daily patient is on Lovenox for DVT prophylaxis  Statin is discontinued secondary to muscle cramps and muscle pain  Patient is on Lovenox for DVT prophylaxis  Discussed with patient and family at bedside  Chest x-ray with no pneumothorax  Patient is clinically hemodynamically stable  Continue postsurgical care  Discussed with nursing staff  Continue current medical management  Stable from cardiac standpoint  Plans for discharge home today  Need for close monitoring and follow-up reviewed and discussed with patient  Patient is intolerant to statin  Patient is advised to consider maybe considering getting a PCSK9 inhibitor as an outpatient  Continue current dose of beta-blockers  Risk  "benefits and alternatives reviewed and discussed with patient  Diagnosis and treatment options reviewed and discussed with patient  Follow-up in office in 2 weeks  Recommend outpatient cardiac rehab  Need for antibiotic prophylaxis for dental procedures reviewed and discussed with patient and family                      Chief Complaint: Shortness of breath    Subjective no new complaints    Interval History: No significant change in overall status    History taken from: patient    Review of Systems:  Review of Systems   Constitutional: Negative for chills, decreased appetite and malaise/fatigue.   HENT:  Negative for congestion and nosebleeds.    Eyes:  Negative for blurred vision and double vision.   Cardiovascular:  Negative for chest pain, dyspnea on exertion, irregular heartbeat, leg swelling, near-syncope, orthopnea, palpitations and paroxysmal nocturnal dyspnea.   Respiratory:  Negative for cough and shortness of breath.    Hematologic/Lymphatic: Negative for adenopathy. Does not bruise/bleed easily.   Skin:  Negative for color change and rash.   Musculoskeletal:  Negative for back pain and joint pain.   Gastrointestinal:  Negative for bloating, abdominal pain, hematemesis and hematochezia.   Genitourinary:  Negative for flank pain and hematuria.   Neurological:  Negative for dizziness and focal weakness.   Psychiatric/Behavioral:  Negative for altered mental status. The patient does not have insomnia.        Objective    Vital Signs  Visit Vitals  /81   Pulse 88   Temp 98.7 °F (37.1 °C) (Oral)   Resp 24   Ht 144.8 cm (57\")   Wt 68 kg (150 lb)   SpO2 94%   BMI 32.46 kg/m²     Oxygen Therapy  SpO2: 94 %  Pulse Oximetry Type: Intermittent  Device (Oxygen Therapy): room air  Flow (L/min) (Oxygen Therapy): 1  Oxygen Concentration (%): 40  Flowsheet Rows      Flowsheet Row First Filed Value   Admission Height 144.8 cm (57\") Documented at 01/14/2025 0646   Admission Weight 65.7 kg (144 lb 12.8 oz) Documented " at 01/14/2025 0646          Intake & Output (last 3 days)         01/12 0701  01/13 0700 01/13 0701  01/14 0700 01/14 0701  01/15 0700 01/15 0701  01/16 0700    P.O.   300 720    I.V. (mL/kg)   1993 (30)     Blood   600     IV Piggyback   1150     Total Intake(mL/kg)   4043 (60.9) 720 (10.8)    Urine (mL/kg/hr)   2380 (1.5) 220 (0.3)    Blood   500     Chest Tube   149 100    Dialysis   1900     Total Output   4929 320    Net   -886 +400            Urine Unmeasured Occurrence   2 x           Lines, Drains & Airways       Active LDAs       Name Placement date Placement time Site Days    CVC Double Lumen 01/14/25 Right Internal jugular 01/14/25  1323  created via procedure documentation  Internal jugular  1    Urethral Catheter Temperature probe 16 Fr. 01/14/25  --  -- 1    Y Chest Tube 1 and 2 Mediastinal 28 Fr. Mediastinal 28 Fr. 01/14/25  --  -- 1    Pacer Wires 01/14/25  --  Atrial and Ventricular  1                    Physical Exam:  Constitutional:       Appearance: Well-developed.   Eyes:      Conjunctiva/sclera: Conjunctivae normal.      Pupils: Pupils are equal, round, and reactive to light.   HENT:      Head: Normocephalic and atraumatic.   Neck:      Thyroid: No thyromegaly.   Pulmonary:      Effort: Pulmonary effort is normal.      Breath sounds: Normal breath sounds. Examination of the right-lower field reveals decreased breath sounds. Examination of the left-lower field reveals decreased breath sounds.   Cardiovascular:      Normal rate. Regular rhythm.   Pulses:     Intact distal pulses.   Edema:     Peripheral edema absent.   Abdominal:      General: Bowel sounds are normal.      Palpations: Abdomen is soft.   Musculoskeletal:      Cervical back: Normal range of motion and neck supple. Skin:     General: Skin is warm.   Neurological:      Mental Status: Alert and oriented to person, place, and time.     Extubated  Alert and awake  Chest tubes are in place  Currently being paced    Results Review:     I  reviewed the patient's new clinical results.    Lab Results (last 24 hours)       Procedure Component Value Units Date/Time    POC Glucose 4x Daily Before Meals & at Bedtime [[id number]]  (Normal) Collected: 01/18/25 1116    Specimen: Blood Updated: 01/18/25 1118     Glucose 95 mg/dL      Comment: Serial Number: [id number]:  [id number]       POC Glucose 4x Daily Before Meals & at Bedtime [[id number]]  (Normal) Collected: 01/18/25 0728    Specimen: Blood Updated: 01/18/25 0731     Glucose 92 mg/dL      Comment: Serial Number: [id number]:  [id number]       Basic Metabolic Panel [[id number]]  (Abnormal) Collected: 01/18/25 0344    Specimen: Blood from Arm, Right Updated: 01/18/25 0417     Glucose 89 mg/dL      BUN 16 mg/dL      Creatinine 0.58 mg/dL      Sodium 140 mmol/L      Potassium 3.6 mmol/L      Chloride 104 mmol/L      CO2 25.5 mmol/L      Calcium 9.3 mg/dL      BUN/Creatinine Ratio 27.6     Anion Gap 10.5 mmol/L      eGFR 98.7 mL/min/1.73     Narrative:      GFR Categories in Chronic Kidney Disease (CKD)      GFR Category          GFR (mL/min/1.73)    Interpretation  G1                     90 or greater         Normal or high (1)  G2                      60-89                Mild decrease (1)  G3a                   45-59                Mild to moderate decrease  G3b                   30-44                Moderate to severe decrease  G4                    15-29                Severe decrease  G5                    14 or less           Kidney failure          (1)In the absence of evidence of kidney disease, neither GFR category G1 or G2 fulfill the criteria for CKD.    eGFR calculation 2021 CKD-EPI creatinine equation, which does not include race as a factor    CBC (No Diff) [[id number]]  (Abnormal) Collected: 01/18/25 0344    Specimen: Blood from Arm, Right Updated: 01/18/25 0354     WBC 8.77 10*3/mm3      RBC 3.44 10*6/mm3      Hemoglobin 9.5 g/dL      Hematocrit 29.1 %      MCV 84.6 fL      MCH 27.6  pg      MCHC 32.6 g/dL      RDW 14.2 %      RDW-SD 43.7 fl      MPV 10.5 fL      Platelets 116 10*3/mm3     POC Glucose Once [[id number]]  (Abnormal) Collected: 01/17/25 2006    Specimen: Blood Updated: 01/17/25 2007     Glucose 107 mg/dL      Comment: Serial Number: [id number]:  [id number]       POC Glucose 4x Daily Before Meals & at Bedtime [[id number]]  (Abnormal) Collected: 01/17/25 1726    Specimen: Blood Updated: 01/17/25 1728     Glucose 115 mg/dL      Comment: Serial Number: [id number]:  [id number]             Results for orders placed during the hospital encounter of 12/28/24    Adult Transthoracic Echo Complete W/ Cont if Necessary Per Protocol    Interpretation Summary    Left ventricular ejection fraction appears to be 46 - 50%.    Left ventricular diastolic function is consistent with (grade II w/high LAP) pseudonormalization.    The left atrial cavity is moderate to severely dilated.    Severe aortic valve stenosis is present.    Moderate mitral valve regurgitation is present.    Estimated right ventricular systolic pressure from tricuspid regurgitation is moderately elevated (45-55 mmHg).    Moderate to severe pulmonary hypertension is present.        Medication Review:   I have reviewed the patient's current medication list  Scheduled Meds:aspirin, 81 mg, Oral, Daily  chlorhexidine, 15 mL, Mouth/Throat, Q12H  enoxaparin, 40 mg, Subcutaneous, Q24H  gabapentin, 100 mg, Oral, TID  insulin lispro, 2-9 Units, Subcutaneous, 4x Daily AC & at Bedtime  metoprolol tartrate, 12.5 mg, Oral, Q12H  polyethylene glycol, 17 g, Oral, BID  senna-docusate sodium, 2 tablet, Oral, BID      Continuous Infusions:     PRN Meds:.  acetaminophen **OR** acetaminophen **OR** acetaminophen    bisacodyl    bisacodyl    Calcium Replacement - Follow Nurse / BPA Driven Protocol    cyclobenzaprine    dextrose    dextrose    glucagon (human recombinant)    Magnesium Cardiology Dose Replacement - Follow Nurse / BPA Driven  Protocol    magnesium hydroxide    nitroglycerin    ondansetron    Phosphorus Replacement - Follow Nurse / BPA Driven Protocol    Potassium Replacement - Follow Nurse / BPA Driven Protocol    traMADol    ECG/EMG Results (last 24 hours)       Procedure Component Value Units Date/Time    ECG 12 Lead Other; post op [[id number]] Collected: 01/14/25 1756     Updated: 01/14/25 2047     QT Interval 449 ms      QTC Interval 496 ms     Narrative:      HEART RATE=73  bpm  RR Gggdfofm=456  ms  [place] Ggxwwzms=173  ms  P Horizontal Axis=7  deg  P Front Axis=71  deg  QRSD Padrtbou=108  ms  QT Pdkpcrno=294  ms  UKvE=052  ms  QRS Axis=60  deg  T Wave Axis=190  deg  - ABNORMAL ECG -  Sinus rhythm  Left bundle branch block  ST elevation secondary to IVCD  When compared with ECG of 13-Jan-2025 08:00:09,  No significant change  Electronically Signed By: Makayla Driscoll ([facility]) 2025-01-14 20:44:20  Date and Time of Study:2025-01-14 17:56:05    ECG 12 Lead Other; post op [[id number]] Collected: 01/15/25 0309     Updated: 01/15/25 0309     QT Interval 447 ms      QTC Interval 484 ms     Narrative:      HEART RATE=74  bpm  RR Gktatyyy=585  ms  [place] Vsewkjui=208  ms  P Horizontal Axis=  deg  P Front Axis=47  deg  QRSD Lyvmqgxo=562  ms  QT Kvjneteh=025  ms  WUtW=506  ms  QRS Axis=38  deg  T Wave Axis=138  deg  - ABNORMAL ECG -  Sinus rhythm  Atrial premature complexes  Probable left atrial enlargement  Left bundle branch block  ST elevation secondary to IVCD  Date and Time of Study:2025-01-15 03:09:08            Imaging Results (Last 24 Hours)       ** No results found for the last 24 hours. **          Results for orders placed during the hospital encounter of 12/28/24    Cardiac Catheterization/Vascular Study    Conclusion  Table formatting from the original result was not included.  Cardiac Catheterization Operative Report    Rachael Rodriguez  [id number]  12/30/2024  @PCP@    She underwent cardiac catheterization.    Indications for the  procedure include: Valvular heart disease with severe aortic stenosis.    Results for orders placed during the hospital encounter of 12/28/24    Adult Transthoracic Echo Complete W/ Cont if Necessary Per Protocol    Interpretation Summary    Left ventricular ejection fraction appears to be 46 - 50%.    Left ventricular diastolic function is consistent with (grade II w/high LAP) pseudonormalization.    The left atrial cavity is moderate to severely dilated.    Severe aortic valve stenosis is present.    Moderate mitral valve regurgitation is present.    Estimated right ventricular systolic pressure from tricuspid regurgitation is moderately elevated (45-55 mmHg).    Moderate to severe pulmonary hypertension is present.    No results found for this or any previous visit.        Procedure Details:  The risks, benefits, complications, treatment options, and expected outcomes were discussed with the patient. The patient and/or family concurred with the proposed plan, giving informed consent.    After informed consent the patient was brought to the cath lab after appropriate IV hydration was begun and oral premedication was given. She was further sedated with fentanyl. She was prepped and draped in the usual manner. Using the modified Seldinger access technique, a 6 [place] sheath was placed in the femoral artery. A left heart catheterization with coronary arteriography was performed. Findings are discussed below.    7 [place] venous sheath was placed in the right femoral vein using ultrasound guidance and then we used a 7 [place] Irish chest tube Breckenridge-Oscar catheter to the right heart catheterization and hemodynamic pressure measurements.    After the procedure was completed, sedation was stopped and the sheaths and catheters were all removed. Hemostasis was achieved per established hospital protocols.    Conscious sedation:  Conscious sedation was performed according to protocol.  I supervised and directed an independent trained  observer with the assistance of monitoring the patient's level of consciousness and physiologic status throughout the procedure.  Intraoperative service time was 90 minutes.    Findings:    Hemodynamics Central aortic pressure systolic 146 diastolic 88 with a mean pressure of 150 mmHg      Right heart catheterization with hemodynamics      Pulmonary capillary wedge pressure was 20 mmHg  PA pressure systolic 45 diastolic 28 with a mean pressure of 36 mmHg  RV pressure systolic 40 mmHg  Right atrial pressure was 8 mmHg  PA saturation was 73%  Right atrial saturation was of 69%  Aortic saturation was 96%  Silvia equation cardiac output was 4.3 L/min  Silvia equation cardiac index was 2.6 L/min/m²  Pulmonary vascular resistance was 239 dynes per second  Systemic vascular resistance was 2002 dynes per second  Left Main Large-caliber vessel angiographically normal bifurcates into left anterior descending and left circumflex arteries  RCA Large-caliber vessel angiographically normal  LAD Large-caliber vessel angiographically normal  Circ Large-caliber vessel angiographically normal  LV Not done  Coronary Dominance Right coronary artery    Estimated Blood Loss:  Minimal    Specimens: None    Complications:  None; patient tolerated the procedure well.    Disposition: PACU - hemodynamically stable.    Condition: stable    Impressions:  Normal coronaries  Moderate pulmonary hypertension    Recommendations:  Further evaluation for TAVR versus surgical AVR     Results for orders placed during the hospital encounter of 12/28/24    Adult Transthoracic Echo Complete W/ Cont if Necessary Per Protocol    Interpretation Summary    Left ventricular ejection fraction appears to be 46 - 50%.    Left ventricular diastolic function is consistent with (grade II w/high LAP) pseudonormalization.    The left atrial cavity is moderate to severely dilated.    Severe aortic valve stenosis is present.    Moderate mitral valve regurgitation is  present.    Estimated right ventricular systolic pressure from tricuspid regurgitation is moderately elevated (45-55 mmHg).    Moderate to severe pulmonary hypertension is present.     Lab Results   Component Value Date    GLUCOSE 89 01/18/2025    BUN 16 01/18/2025    CREATININE 0.58 01/18/2025    EGFR 98.7 01/18/2025    BCR 27.6 (H) 01/18/2025    K 3.6 01/18/2025    CO2 25.5 01/18/2025    CALCIUM 9.3 01/18/2025    ALBUMIN 4.8 01/15/2025    BILITOT 0.4 01/13/2025    AST 25 01/13/2025    ALT 16 01/13/2025      Lab Results   Component Value Date    CHOL 254 (H) 01/13/2025    TRIG 114 01/13/2025    HDL 78 (H) 01/13/2025     (H) 01/13/2025      Lab Results   Component Value Date    TROPONINT 16 (H) 12/28/2024        Assessment & Plan       Nonrheumatic aortic valve stenosis    Nonrheumatic aortic (valve) stenosis    S/P tissue AVR with left atrial appendage ligation per Dr. Mcbride 1/14/2025        Makayla Driscoll MD  01/18/25  13:34 EST

## 2025-01-18 NOTE — DISCHARGE PLACEMENT REQUEST
"Rachael Whitten (69 y.o. Female)       Date of Birth   1955    Social Security Number       [address] [place] [address]    Home Phone   [phone]    MRN   [id number]       Caodaism   Anabaptist    Marital Status                               Admission Date   1/14/25    Admission Type   Elective    Admitting Provider   Zac Mcbride MD    Attending Provider   Zac Mcbride MD    Department, Room/Bed   [facility] CARDIOVASCULAR CARE UNIT, 3103/1       Discharge Date       Discharge Disposition   Home-Health Care Svc    Discharge Destination                                 Attending Provider: Zac Mcbride MD    Allergies: Sulfa Antibiotics, Penicillins    Isolation: None   Infection: None   Code Status: CPR    Ht: 144.8 cm (57\")   Wt: 68 kg (150 lb)    Admission Cmt: None   Principal Problem: Nonrheumatic aortic valve stenosis [I35.0]                   Active Insurance as of 1/14/2025       Primary Coverage       Payor Plan Insurance Group Employer/Plan Group    ANTHEM MEDICARE REPLACEMENT ANTHEM MED ADV HMO [id number]       Payor Plan Address Payor Plan Phone Number Payor Plan Fax Number Effective Dates    [address] [phone]  1/1/2025 - None Entered    [facility] [address]         Subscriber Name Subscriber Birth Date Member ID       RACHAEL WHITTEN 1955 [id number]                     Emergency Contacts        (Rel.) Home Phone Work Phone Mobile Phone    NICOLE WHITTEN (Spouse) -- -- [phone]                 History & Physical        Kayleigh Farrell PA at 01/13/25 1100       Attestation signed by Zac Mcbride MD at 01/14/25 1254    I have reviewed this documentation and agree.                  [facility] Cardiac Surgery        Patient Care Team:  Candy Mayberry MD as PCP - General (Family Medicine)  Provider, No Known as PCP - Family Medicine     Chief complaint  Aortic stenosis      "   Subjective  History of Present Illness  Patient is a 69 y.o. female but does not routinely take any medications at home.  Presented to the ED with worsening shortness of breath over the last week.  It was worse at night when laying flat.  She became dyspneic with minimal activity.  She also complained of fatigue and lower extrem edema.  She denied chest pain, palpitations, syncope, fever/chills, or nausea/vomiting.  In the ED her troponin was 19 and BNP 3K.  Patient was concern for pneumonia as she recently had a family member with pneumonia.  She was given a steroids and antibiotics that has since been de-escalated.  She had a pronounced murmur and echo was completed.  Echo showed severe aortic stenosis, moderate mitral regurgitation, EF 46 to 50%.  She underwent cardiac catheterization that showed no significant coronary artery disease.  She is referred for surgical evaluation of her valvular heart disease.     Review of Systems   Constitutional:  Positive for fatigue.   Respiratory:  Positive for shortness of breath.    Cardiovascular:  Positive for leg swelling.   All other systems reviewed and are negative.        Medical History        Past Medical History:   Diagnosis Date    Hypertension           Surgical History   History reviewed. No pertinent surgical history.     History reviewed. No pertinent family history.  Social History   Social History            Tobacco Use    Smoking status: Never       Passive exposure: Never    Smokeless tobacco: Never   Vaping Use    Vaping status: Never Used   Substance Use Topics    Alcohol use: Never    Drug use: Never         Prescriptions Prior to Admission   No medications prior to admission.           Scheduled Medication   atorvastatin, 20 mg, Oral, Nightly  furosemide, 40 mg, Intravenous, Daily  pantoprazole, 40 mg, Intravenous, Q AM  sodium chloride, 10 mL, Intravenous, Q12H         Allergies:  Sulfa antibiotics and Penicillins        Objective  Vital Signs  Temp:  " [98.2 °F (36.8 °C)-99 °F (37.2 °C)] 98.3 °F (36.8 °C)  Heart Rate:  [] 96  Resp:  [14-24] 18  BP: (127-165)/() 137/83     Flowsheet Rows       Flowsheet Row First Filed Value   Admission Height 144.8 cm (57\") Documented at 12/28/2024 0100   Admission Weight 65.8 kg (145 lb) Documented at 12/28/2024 0100             144.8 cm (57\")     Physical Exam  Constitutional:       General: She is not in acute distress.     Appearance: Normal appearance.   HENT:      Head: Normocephalic and atraumatic.      Mouth/Throat:      Mouth: Mucous membranes are moist.      Pharynx: Oropharynx is clear.   Cardiovascular:      Rate and Rhythm: Normal rate and regular rhythm.      Heart sounds: Murmur heard.   Pulmonary:      Effort: Pulmonary effort is normal. No respiratory distress.   Abdominal:      General: There is distension.      Tenderness: There is no abdominal tenderness.   Musculoskeletal:         General: Swelling present. Normal range of motion.   Skin:     General: Skin is warm and dry.   Neurological:      General: No focal deficit present.      Mental Status: She is alert and oriented to person, place, and time.   Psychiatric:         Mood and Affect: Mood normal.         Behavior: Behavior normal.         Thought Content: Thought content normal.         Judgment: Judgment normal.            Results Review:   Lab Results (last 24 hours)         Procedure Component Value Units Date/Time     POC Chem 8, arterial (ISTAT) [[id number]]  (Abnormal) Collected: 12/30/24 1034     Specimen: Arterial Blood Updated: 12/30/24 1207       Ionized Calcium 1.32 mmol/L         pH, Arterial 7.380 pH units         pCO2, Arterial 37.9 mm Hg         pO2, Arterial 81.0 mm Hg         HCO3, Arterial 22.7 mmol/L         Base Excess, Arterial <0.0 mmol/L         Base Deficit --       O2 Saturation, Arterial 96.0 %         Glucose 148 mg/dL         Comment: Serial Number: [id number]:  [id number]          Sodium 136 mmol/L         POC " Potassium 4.0 mmol/L         Hematocrit 38 %         Hemoglobin 12.9 g/dL         CO2 Content 24 mmol/L       POC Chem 8, arterial (ISTAT) [[id number]]  (Abnormal) Collected: 12/30/24 1027     Specimen: Arterial Blood Updated: 12/30/24 1207       Ionized Calcium 1.33 mmol/L         pH, Arterial 7.380 pH units         pCO2, Arterial 38.0 mm Hg         pO2, Arterial 56.0 mm Hg         HCO3, Arterial 22.8 mmol/L         Base Excess, Arterial <0.0 mmol/L         Base Deficit --       O2 Saturation, Arterial 89.0 %         Glucose 152 mg/dL         Comment: Serial Number: [id number]:  [id number]          Sodium 138 mmol/L         POC Potassium 4.0 mmol/L         Hematocrit 40 %         Hemoglobin 13.6 g/dL         CO2 Content 24 mmol/L       POC Chem Panel [[id number]]  (Abnormal) Collected: 12/30/24 1023     Specimen: Venous Blood Updated: 12/30/24 1207       Glucose 155 mg/dL         Comment: Serial Number: [id number]:  [id number]          Sodium 138 mmol/L         POC Potassium 4.2 mmol/L         Ionized Calcium 1.32 mmol/L         Hematocrit 41 %         Hemoglobin 13.9 g/dL         pH, Venous 7.430 pH Units         pCO2, Venous 36.6 mm Hg         CO2 CONTENT  25 mmol/L         HCO3, Venous 24.3 mmol/L         Base Excess, Venous 0.0 mmol/L         Base Deficit --       O2 Saturation, Venous --       pO2, Venous 37.0 mm Hg       POC Chem Panel [[id number]]  (Abnormal) Collected: 12/30/24 1024     Specimen: Venous Blood Updated: 12/30/24 1155       Glucose 153 mg/dL         Comment: Serial Number: [id number]:  [id number]          Sodium 138 mmol/L         POC Potassium 4.0 mmol/L         Ionized Calcium 1.31 mmol/L         Hematocrit 40 %         Hemoglobin 13.6 g/dL         pH, Venous 7.390 pH Units         pCO2, Venous 39.7 mm Hg         CO2 CONTENT  25 mmol/L         HCO3, Venous 24.0 mmol/L         Base Excess, Venous <0.0 mmol/L         Base Deficit --       O2 Saturation, Venous --       pO2, Venous 36.0 mm Hg        Basic Metabolic Panel [[id number]]  (Abnormal) Collected: 12/30/24 0423     Specimen: Blood Updated: 12/30/24 0539       Glucose 140 mg/dL         BUN 26 mg/dL         Creatinine 0.77 mg/dL         Sodium 137 mmol/L         Potassium 5.0 mmol/L         Comment: Result checked             Chloride 104 mmol/L         CO2 22.0 mmol/L         Calcium 9.9 mg/dL         BUN/Creatinine Ratio 33.8       Anion Gap 11.0 mmol/L         eGFR 84.1 mL/min/1.73       Narrative:       GFR Categories in Chronic Kidney Disease (CKD)                         GFR Category          GFR (mL/min/1.73)    Interpretation  G1                       90 or greater              Normal or high (1)  G2                               60-89                Mild decrease (1)  G3a                   45-59                Mild to moderate decrease  G3b                   30-44                Moderate to severe decrease  G4                    15-29                Severe decrease  G5                    14 or less           Kidney failure                                                 (1)In the absence of evidence of kidney disease, neither GFR category G1 or G2 fulfill the criteria for CKD.     eGFR calculation 2021 CKD-EPI creatinine equation, which does not include race as a factor     Blood Culture - Blood, Arm, Left [[id number]]  (Normal) Collected: 12/28/24 0318     Specimen: Blood from Arm, Left Updated: 12/30/24 0330       Blood Culture No growth at 2 days     Narrative:       Less than seven (7) mL's of blood was collected.  Insufficient quantity may yield false negative results.     Blood Culture - Blood, Arm, Right [[id number]]  (Normal) Collected: 12/28/24 0311     Specimen: Blood from Arm, Right Updated: 12/30/24 0315       Blood Culture No growth at 2 days                            Assessment & Plan    CHF (congestive heart failure)    Multifocal pneumonia    LBBB (left bundle branch block)        Assessment & Plan     - severe aortic stenosis  -  moderate mitral regurg  - PHTN  - acute on chronic diastolic heart failure, HFprEF 45-50%  - HTN  - HLD  - h/o sternal fracture, pt reports enlarged aorta on CT     Patient seen and discussed with Dr. Mcbride.  She presented with shortness of breath and was found to have severe aortic stenosis and moderate mitral regurg. Cath showed normal coronaries and PHTN. She is scheduled to have BENJAMIN tomorrow for further evaluation.  She reports a history of a sternal fracture and on that CT it showed an enlarged aorta.  Will order a noncontrast chest CT to evaluate. Further plan depending on results of above.        Thank you for allowing us to participate in the care of this patient.       Bakari Solomon PA-C  12/30/24  15:57 EST     Addendum  Patient was seen and examined by me, agree with the findings above.  I have reviewed the echocardiogram, cardiac cath and chest CT and interpreted results myself.  He has severe aortic stenosis and mild to moderate mitral regurgitation.  He has pulmonary hypertension.  I recommend aortic valve replacement to prolong survival, symptomatic relief and to prevent adverse events.  I had discussed the risk (STS calculated), benefits and terms of surgery and she wishes to proceed.  She has a tooth with infection and she will contact her dentist for early repair and we will book surgery as an outpatient after that is taken care of.  I have offered him to see them in office after the dental visit or she has scheduled the surgery at that time.  The office will follow-up with them to schedule surgery based on the dentistry timing  Zac Mcbride MD      HISTORY & PHYSICAL UPDATE 1/13/25:    Patient presents to Pre-Admission Testing today in preparation for aortic valve replacement with Dr. Mcbride on 1/14/25. She denies recent illness since last being seen on 12/30/24, but reports having a tooth extraction on 1/3/25. She states the dentist told her he saw no signs of infection and she completed a  "course of prophylactic antibiotics last week. She states her mouth has healed and she denies dental pain or other issues. Labs reviewed. UA with trace leukocytes and 3-5 WBC, but negative for bacteria and nitrites. All questions were answered to patient's satisfaction and she states she feels ready for surgery tomorrow. Plan for AVR with a bioprosthetic valve tomorrow with Dr. Mcbride.      Electronically signed by Zac Mcbride MD at 25 1254          [facility] CARDIOVASCULAR CARE UNIT  [address] [facility] [place] [address]  Dept. Phone:    Dept. Fax:   Date Ordered: 2025         Patient:  Rachael Rodriguez MRN:  [id number]   [address] [place] [address] :  1955  SSN:    Phone: [phone] Sex:  F     Weight: 68 kg (150 lb)         Ht Readings from Last 1 Encounters:   25 144.8 cm (57\")         Oxygen Therapy         (Order ID: [id number])    Diagnosis:  S/P AVR (Z95.2 [ICD-10-CM] V43.3 [ICD-9-CM])   Quantity:  1     Delivery Modality: Nasal Cannula  Oxygen Flow Rate (LPM): 1  Duration: During Sleep  Equipment:  Oxygen Concentrator &  &  Stationary Gaseous Oxygen System & Contents &  Conserving Regulator  Length of Need: 3 Months        Authorizing Provider's Phone:   Authorizing Provider:Jay Jay Cleaning MD  Authorizing Provider's NPI: [id number]  Order Entered By: Jay Jay Cleaning MD 2025  2:49 PM     Electronically signed by: Jay Jay Cleaning MD 2025  2:49 PM                      File Link    Scan on 2025 1442 by [employer], [place]: TRACY ÁLVAREZ CM NOCTURNAL 02        Key Information    Document ID File Type Document Type Description   [id number] Image CARE COORDINATION - SCAN  PREETI FARAH NOCTURNAL 02     Import Information    Attached At Date Time User Dept   Encounter Level 2025  2:42 PM [employer], [place]      Encounter    Hospital Encounter on 25              Psychiatric " "CARDIOVASCULAR CARE UNIT  [facility] [facility] [place] [address]  Dept. Phone:    Dept. Fax:   Date Ordered: 2025         Patient:  Rachael Rodriguez MRN:  [id number]   [place] [address] :  1955  SSN:    Phone: [phone] Sex:  F     Weight: 68 kg (150 lb)         Ht Readings from Last 1 Encounters:   25 144.8 cm (57\")         Walker  Walker Folding with Wheels              (Order ID: [id number])    Diagnosis:  S/P AVR (Z95.2 [ICD-10-CM] V43.3 [ICD-9-CM])   Quantity:  1     Mobility Limitation: Prevents Accomplishing MRADLs  Safety: Able to Safely Use Equipment  Mobility Deficit: Mobility Deficit Can Be Sufficiently Resolved By Use of Equipment  Equipment:  Walker Folding with Wheels  Length of Need: 99 Months = Lifetime        Authorizing Provider's Phone:   Verbal Order Mode: Verbal with readback   Authorizing Provider: Jay Jay Cleaning MD  Authorizing Provider's NPI: [id number]     Order Entered By: Joellen Onofre RN 2025  1:36 PM     Electronically signed by: Jay Jay Cleaning MD 2025  2:11 PM     "

## 2025-01-18 NOTE — PROCEDURES
Exercise Oximetry    Patient Name:Rachael Rodriguez   MRN: [id number]   Date: 01/18/25             ROOM AIR BASELINE   SpO2% 92   Heart Rate    Blood Pressure      EXERCISE ON ROOM AIR SpO2% EXERCISE ON O2 @  LPM SpO2%   1 MINUTE 92 1 MINUTE    2 MINUTES 93 2 MINUTES    3 MINUTES 94 3 MINUTES    4 MINUTES 94 4 MINUTES    5 MINUTES 95 5 MINUTES    6 MINUTES 95 6 MINUTES               Distance Walked   Distance Walked   Dyspnea (Carmencita Scale)   Dyspnea (Carmencita Scale)   Fatigue (Carmencita Scale)   Fatigue (Carmencita Scale)   SpO2% Post Exercise   SpO2% Post Exercise   HR Post Exercise   HR Post Exercise   Time to Recovery   Time to Recovery     Comments: during room air ambulation, patient did not drop below 92%       Lin Newman, CRT  1/18/2025  11:11 EST

## 2025-01-19 NOTE — CASE MANAGEMENT/SOCIAL WORK
Case Management Discharge Note      Final Note: Home with spouse. New RW and nocturnal 02 at 1L through [place]       Durable Medical Equipment Coordination complete.      Service Provider Services Address Phone Fax Patient Preferred    [facility] [employer] [address], [place] [address] [phone] --               Transportation Services  Private: Car    Final Discharge Disposition Code: 01 - home or self-care

## 2025-01-19 NOTE — OUTREACH NOTE
Prep Survey      Flowsheet Row Responses   [facility] facility patient discharged from? [facility]   Is LACE score < 7 ? No   Eligibility Readm Mgmt   Discharge diagnosis Nonrheumatic aortic valve stenosis, AORTIC VALVE REPLACEMENT   Does the patient have one of the following disease processes/diagnoses(primary or secondary)? Cardiothoracic surgery   Does the patient have Home health ordered? No   Is there a DME ordered? Yes   What DME was ordered? rolling walker, nocturnal O2 from [place]   Prep survey completed? Yes            Caterina CHANG - Registered Nurse

## 2025-01-20 ENCOUNTER — TELEPHONE (OUTPATIENT)
Dept: CARDIAC SURGERY | Facility: CLINIC | Age: 70
End: 2025-01-20
Payer: MEDICARE

## 2025-01-20 DIAGNOSIS — Z95.2 S/P AVR: Primary | ICD-10-CM

## 2025-01-20 RX ORDER — TRAMADOL HYDROCHLORIDE 50 MG/1
50 TABLET ORAL EVERY 8 HOURS PRN
Qty: 15 TABLET | Refills: 0 | Status: SHIPPED | OUTPATIENT
Start: 2025-01-20

## 2025-01-20 NOTE — TELEPHONE ENCOUNTER
Discussed with ALEX Vásquez, who ran an INSPECT report. Tramadol 50 mg every 8 hours as needed sent to pharmacy. Left voicemail for spouse to return call to discuss recommendations.

## 2025-01-20 NOTE — TELEPHONE ENCOUNTER
Caller: NICOLE WHITTEN    Relationship: Emergency Contact    Best call back number: [phone]      PATIENTS  CALLED IN ASKING IF THERE CAN BE A REFILL FOR THE TRAMADOL AS THE PATIENT IS IN A LOT OF PAIN.

## 2025-01-23 ENCOUNTER — TELEPHONE (OUTPATIENT)
Dept: CARDIOLOGY | Facility: CLINIC | Age: 70
End: 2025-01-23

## 2025-01-23 ENCOUNTER — READMISSION MANAGEMENT (OUTPATIENT)
Dept: CALL CENTER | Facility: HOSPITAL | Age: 70
End: 2025-01-23
Payer: MEDICARE

## 2025-01-23 NOTE — OUTREACH NOTE
CT Surgery Week 1 Survey      Flowsheet Row Responses   [facility] patient discharged from? [facility]   Does the patient have one of the following disease processes/diagnoses(primary or secondary)? Cardiothoracic surgery   Week 1 attempt successful? Yes   Call start time 1634   Call end time 1642   Discharge diagnosis Nonrheumatic aortic valve stenosis, AORTIC VALVE REPLACEMENT   Person spoke with today (if not patient) and relationship Pa,  and pt   Meds reviewed with patient/caregiver? Yes   Is the patient having any side effects they believe may be caused by any medication additions or changes? No   Does the patient have all medications related to this admission filled (includes all antibiotics, pain medications, cardiac medications, etc.) Yes   Is the patient taking all medications as directed (includes completed medication regime)? Yes   Does the patient have a primary care provider?  Yes   Does the patient have an appointment scheduled with their C/T surgeon? Yes   Has the patient kept scheduled appointments due by today? N/A   Has home health visited the patient within 72 hours of discharge? N/A   What DME was ordered? rolling walker, nocturnal O2 from [place]   Has all DME been delivered? Yes   Psychosocial issues? No   Comments O2 sats WNL throughout day on RA, wears O2 at night 2 L   Did the patient receive a copy of their discharge instructions? Yes   Nursing interventions Reviewed instructions with patient   What is the patient's perception of their health status since discharge? Improving   Nursing interventions Nurse provided patient education   Is the patient/caregiver able to teach back normal signs of recovery? Pain or discomfort at incisional site   Nursing interventions Reassured on normal signs of recovery   Is the patient /caregiver able to teach back basic post-op care? Continue use of incentive spirometry at least 1 week post discharge, Practice cough and deep breath every 4 hours  while awake, Hold pillow to support chest when coughing, Drive as instructed by MD in discharge instructions, Shower daily, No tub bath, swimming, or hot tub until instructed by MD, Use a clean wash cloth and antibacterial bar or liquid soap to clean incisions, If the steri-strips are falling off, it is okay to remove them. (If applicable), Lifting as instructed by MD in discharge instructions   Is the patient/caregiver able to teach back signs and symptoms of incisional infection? Increased redness, swelling or pain at the incisonal site, Increased drainage or bleeding, Incisional warmth, Pus or odor from incision, Fever   Is the patient/caregiver able to teach back steps to recovery at home? Set small, achievable goals for return to baseline health, Rest and rebuild strength, gradually increase activity, Eat a well-balance diet   Is the patient/caregiver able to teach back the hierarchy of who to call/visit for symptoms/problems? PCP, Specialist, Home health nurse, Urgent Care, ED, 911 Yes   Week 1 call completed? Yes   Call end time 1642              Palmira Beal Registered Nurse

## 2025-01-23 NOTE — TELEPHONE ENCOUNTER
Caller: NICOLE WHITTEN    Relationship to patient: Emergency Contact    Best call back number:[phone]    New or established patient?  [] New  [x] Established    Date of discharge: 01.01.25    Facility discharged from: [facility]    Diagnosis/Symptoms: RIGHT AND LEFT HEART CATH    Length of stay (If applicable): 5 DAYS    Specialty Only: Did you see a [facility] provider?    [x] Yes  [] No  If so, who? DR. COTREZ    Additional Details: PATIENT HAD RIGHT AND LEFT HEART CATH DONE BY DR. CORTEZ ON 12.30.24. PATIENT ALSO HAD A CARDIOTHORACIC PROCEDURE DONE ON 01.14.25 DONE BY DR. MORTON AND DISCHARGED FROM [facility] ON 01.18.25. HUB HAD NO SCHEDULING DIRECTIONS. PLEASE CONTACT PATIENT TO SCHEDULE. THANK YOU

## 2025-02-04 ENCOUNTER — APPOINTMENT (OUTPATIENT)
Dept: GENERAL RADIOLOGY | Facility: HOSPITAL | Age: 70
End: 2025-02-04
Payer: MEDICARE

## 2025-02-04 ENCOUNTER — HOSPITAL ENCOUNTER (INPATIENT)
Facility: HOSPITAL | Age: 70
LOS: 1 days | Discharge: HOME | End: 2025-02-05
Attending: FAMILY MEDICINE
Payer: MEDICARE

## 2025-02-04 DIAGNOSIS — R05.1 ACUTE COUGH: ICD-10-CM

## 2025-02-04 DIAGNOSIS — Z95.2 S/P AVR: ICD-10-CM

## 2025-02-04 DIAGNOSIS — J10.1 INFLUENZA A: Primary | ICD-10-CM

## 2025-02-04 DIAGNOSIS — R50.9 FEVER, UNSPECIFIED FEVER CAUSE: ICD-10-CM

## 2025-02-04 DIAGNOSIS — R09.02 HYPOXIA: ICD-10-CM

## 2025-02-04 PROBLEM — J96.01 ACUTE RESPIRATORY FAILURE WITH HYPOXIA: Status: ACTIVE | Noted: 2025-02-04

## 2025-02-04 LAB
ALBUMIN SERPL-MCNC: 4.3 G/DL (ref 3.5–5.2)
ALBUMIN/GLOB SERPL: 1.4 G/DL
ALP SERPL-CCNC: 104 U/L (ref 39–117)
ALT SERPL W P-5'-P-CCNC: 12 U/L (ref 1–33)
ANION GAP SERPL CALCULATED.3IONS-SCNC: 15.1 MMOL/L (ref 5–15)
AST SERPL-CCNC: 26 U/L (ref 1–32)
B PARAPERT DNA SPEC QL NAA+PROBE: NOT DETECTED
B PERT DNA SPEC QL NAA+PROBE: NOT DETECTED
BASOPHILS # BLD AUTO: 0.06 10*3/MM3 (ref 0–0.2)
BASOPHILS NFR BLD AUTO: 0.9 % (ref 0–1.5)
BILIRUB SERPL-MCNC: 0.3 MG/DL (ref 0–1.2)
BUN SERPL-MCNC: 9 MG/DL (ref 8–23)
BUN/CREAT SERPL: 13.4 (ref 7–25)
C PNEUM DNA NPH QL NAA+NON-PROBE: NOT DETECTED
CALCIUM SPEC-SCNC: 10.1 MG/DL (ref 8.6–10.5)
CHLORIDE SERPL-SCNC: 101 MMOL/L (ref 98–107)
CO2 SERPL-SCNC: 19.9 MMOL/L (ref 22–29)
CREAT SERPL-MCNC: 0.67 MG/DL (ref 0.57–1)
DEPRECATED RDW RBC AUTO: 41.6 FL (ref 37–54)
EGFRCR SERPLBLD CKD-EPI 2021: 95.3 ML/MIN/1.73
EOSINOPHIL # BLD AUTO: 0.04 10*3/MM3 (ref 0–0.4)
EOSINOPHIL NFR BLD AUTO: 0.6 % (ref 0.3–6.2)
ERYTHROCYTE [DISTWIDTH] IN BLOOD BY AUTOMATED COUNT: 13.5 % (ref 12.3–15.4)
FLUAV H1 2009 PAND RNA NPH QL NAA+PROBE: DETECTED
FLUBV RNA ISLT QL NAA+PROBE: NOT DETECTED
GLOBULIN UR ELPH-MCNC: 3.1 GM/DL
GLUCOSE SERPL-MCNC: 123 MG/DL (ref 65–99)
HADV DNA SPEC NAA+PROBE: NOT DETECTED
HCOV 229E RNA SPEC QL NAA+PROBE: NOT DETECTED
HCOV HKU1 RNA SPEC QL NAA+PROBE: NOT DETECTED
HCOV NL63 RNA SPEC QL NAA+PROBE: NOT DETECTED
HCOV OC43 RNA SPEC QL NAA+PROBE: NOT DETECTED
HCT VFR BLD AUTO: 36.7 % (ref 34–46.6)
HGB BLD-MCNC: 11.6 G/DL (ref 12–15.9)
HMPV RNA NPH QL NAA+NON-PROBE: NOT DETECTED
HOLD SPECIMEN: NORMAL
HPIV1 RNA ISLT QL NAA+PROBE: NOT DETECTED
HPIV2 RNA SPEC QL NAA+PROBE: NOT DETECTED
HPIV3 RNA NPH QL NAA+PROBE: NOT DETECTED
HPIV4 P GENE NPH QL NAA+PROBE: NOT DETECTED
IMM GRANULOCYTES # BLD AUTO: 0.04 10*3/MM3 (ref 0–0.05)
IMM GRANULOCYTES NFR BLD AUTO: 0.6 % (ref 0–0.5)
LYMPHOCYTES # BLD AUTO: 0.7 10*3/MM3 (ref 0.7–3.1)
LYMPHOCYTES NFR BLD AUTO: 10.3 % (ref 19.6–45.3)
M PNEUMO IGG SER IA-ACNC: NOT DETECTED
MCH RBC QN AUTO: 26.5 PG (ref 26.6–33)
MCHC RBC AUTO-ENTMCNC: 31.6 G/DL (ref 31.5–35.7)
MCV RBC AUTO: 84 FL (ref 79–97)
MONOCYTES # BLD AUTO: 0.77 10*3/MM3 (ref 0.1–0.9)
MONOCYTES NFR BLD AUTO: 11.3 % (ref 5–12)
NEUTROPHILS NFR BLD AUTO: 5.21 10*3/MM3 (ref 1.7–7)
NEUTROPHILS NFR BLD AUTO: 76.3 % (ref 42.7–76)
NRBC BLD AUTO-RTO: 0 /100 WBC (ref 0–0.2)
PLATELET # BLD AUTO: 383 10*3/MM3 (ref 140–450)
PMV BLD AUTO: 9.8 FL (ref 6–12)
POTASSIUM SERPL-SCNC: 3.9 MMOL/L (ref 3.5–5.2)
PROT SERPL-MCNC: 7.4 G/DL (ref 6–8.5)
QT INTERVAL: 355 MS
QTC INTERVAL: 498 MS
RBC # BLD AUTO: 4.37 10*6/MM3 (ref 3.77–5.28)
RHINOVIRUS RNA SPEC NAA+PROBE: NOT DETECTED
RSV RNA NPH QL NAA+NON-PROBE: NOT DETECTED
SARS-COV-2 RNA RESP QL NAA+PROBE: NOT DETECTED
SODIUM SERPL-SCNC: 136 MMOL/L (ref 136–145)
WBC NRBC COR # BLD AUTO: 6.82 10*3/MM3 (ref 3.4–10.8)
WHOLE BLOOD HOLD COAG: NORMAL
WHOLE BLOOD HOLD SPECIMEN: NORMAL

## 2025-02-04 PROCEDURE — 80053 COMPREHEN METABOLIC PANEL: CPT

## 2025-02-04 PROCEDURE — 71046 X-RAY EXAM CHEST 2 VIEWS: CPT

## 2025-02-04 PROCEDURE — 93005 ELECTROCARDIOGRAM TRACING: CPT

## 2025-02-04 PROCEDURE — 0202U NFCT DS 22 TRGT SARS-COV-2: CPT

## 2025-02-04 PROCEDURE — 99285 EMERGENCY DEPT VISIT HI MDM: CPT

## 2025-02-04 PROCEDURE — 25810000003 SODIUM CHLORIDE 0.9 % SOLUTION

## 2025-02-04 PROCEDURE — 63710000001 ONDANSETRON ODT 4 MG TABLET DISPERSIBLE: Performed by: NURSE PRACTITIONER

## 2025-02-04 PROCEDURE — 85025 COMPLETE CBC W/AUTO DIFF WBC: CPT

## 2025-02-04 PROCEDURE — 25010000002 METOCLOPRAMIDE PER 10 MG

## 2025-02-04 RX ORDER — BENZONATATE 200 MG/1
200 CAPSULE ORAL 3 TIMES DAILY PRN
Qty: 9 CAPSULE | Refills: 0 | Status: ON HOLD | OUTPATIENT
Start: 2025-02-04 | End: 2025-02-06

## 2025-02-04 RX ORDER — TRAMADOL HYDROCHLORIDE 50 MG/1
50 TABLET ORAL EVERY 8 HOURS PRN
Status: DISCONTINUED | OUTPATIENT
Start: 2025-02-04 | End: 2025-02-05 | Stop reason: HOSPADM

## 2025-02-04 RX ORDER — SODIUM CHLORIDE 0.9 % (FLUSH) 0.9 %
10 SYRINGE (ML) INJECTION EVERY 12 HOURS SCHEDULED
Status: DISCONTINUED | OUTPATIENT
Start: 2025-02-04 | End: 2025-02-05 | Stop reason: HOSPADM

## 2025-02-04 RX ORDER — BISACODYL 5 MG/1
5 TABLET, DELAYED RELEASE ORAL DAILY PRN
Status: DISCONTINUED | OUTPATIENT
Start: 2025-02-04 | End: 2025-02-05 | Stop reason: HOSPADM

## 2025-02-04 RX ORDER — ASPIRIN 81 MG/1
81 TABLET ORAL DAILY
Status: DISCONTINUED | OUTPATIENT
Start: 2025-02-04 | End: 2025-02-05 | Stop reason: HOSPADM

## 2025-02-04 RX ORDER — SODIUM CHLORIDE 0.9 % (FLUSH) 0.9 %
10 SYRINGE (ML) INJECTION AS NEEDED
Status: DISCONTINUED | OUTPATIENT
Start: 2025-02-04 | End: 2025-02-05 | Stop reason: HOSPADM

## 2025-02-04 RX ORDER — GUAIFENESIN 200 MG/10ML
200 LIQUID ORAL EVERY 4 HOURS PRN
Status: DISCONTINUED | OUTPATIENT
Start: 2025-02-04 | End: 2025-02-05 | Stop reason: HOSPADM

## 2025-02-04 RX ORDER — ACETAMINOPHEN 325 MG/1
650 TABLET ORAL EVERY 6 HOURS PRN
Status: DISCONTINUED | OUTPATIENT
Start: 2025-02-04 | End: 2025-02-05 | Stop reason: HOSPADM

## 2025-02-04 RX ORDER — ECHINACEA PURPUREA EXTRACT 125 MG
2 TABLET ORAL EVERY 4 HOURS PRN
Status: DISCONTINUED | OUTPATIENT
Start: 2025-02-04 | End: 2025-02-05 | Stop reason: HOSPADM

## 2025-02-04 RX ORDER — OSELTAMIVIR PHOSPHATE 75 MG/1
75 CAPSULE ORAL EVERY 12 HOURS SCHEDULED
Status: DISCONTINUED | OUTPATIENT
Start: 2025-02-04 | End: 2025-02-04

## 2025-02-04 RX ORDER — ALUMINA, MAGNESIA, AND SIMETHICONE 2400; 2400; 240 MG/30ML; MG/30ML; MG/30ML
15 SUSPENSION ORAL EVERY 6 HOURS PRN
Status: DISCONTINUED | OUTPATIENT
Start: 2025-02-04 | End: 2025-02-05 | Stop reason: HOSPADM

## 2025-02-04 RX ORDER — OSELTAMIVIR PHOSPHATE 75 MG/1
75 CAPSULE ORAL EVERY 12 HOURS SCHEDULED
Status: DISCONTINUED | OUTPATIENT
Start: 2025-02-04 | End: 2025-02-05 | Stop reason: HOSPADM

## 2025-02-04 RX ORDER — AMOXICILLIN 250 MG
2 CAPSULE ORAL 2 TIMES DAILY PRN
Status: DISCONTINUED | OUTPATIENT
Start: 2025-02-04 | End: 2025-02-05 | Stop reason: HOSPADM

## 2025-02-04 RX ORDER — BISACODYL 10 MG
10 SUPPOSITORY, RECTAL RECTAL DAILY PRN
Status: DISCONTINUED | OUTPATIENT
Start: 2025-02-04 | End: 2025-02-05 | Stop reason: HOSPADM

## 2025-02-04 RX ORDER — METOCLOPRAMIDE HYDROCHLORIDE 5 MG/ML
10 INJECTION INTRAMUSCULAR; INTRAVENOUS ONCE
Status: COMPLETED | OUTPATIENT
Start: 2025-02-04 | End: 2025-02-04

## 2025-02-04 RX ORDER — ONDANSETRON 4 MG/1
4 TABLET, ORALLY DISINTEGRATING ORAL EVERY 6 HOURS PRN
Status: DISCONTINUED | OUTPATIENT
Start: 2025-02-04 | End: 2025-02-05 | Stop reason: HOSPADM

## 2025-02-04 RX ORDER — ONDANSETRON 2 MG/ML
4 INJECTION INTRAMUSCULAR; INTRAVENOUS EVERY 6 HOURS PRN
Status: DISCONTINUED | OUTPATIENT
Start: 2025-02-04 | End: 2025-02-05 | Stop reason: HOSPADM

## 2025-02-04 RX ORDER — IBUPROFEN 100 MG/5ML
800 SUSPENSION ORAL ONCE
Status: COMPLETED | OUTPATIENT
Start: 2025-02-04 | End: 2025-02-04

## 2025-02-04 RX ORDER — SODIUM CHLORIDE 9 MG/ML
40 INJECTION, SOLUTION INTRAVENOUS AS NEEDED
Status: DISCONTINUED | OUTPATIENT
Start: 2025-02-04 | End: 2025-02-05 | Stop reason: HOSPADM

## 2025-02-04 RX ORDER — ACETAMINOPHEN 160 MG/5ML
650 SOLUTION ORAL EVERY 6 HOURS PRN
Status: DISCONTINUED | OUTPATIENT
Start: 2025-02-04 | End: 2025-02-04

## 2025-02-04 RX ORDER — ALBUTEROL SULFATE 0.83 MG/ML
2.5 SOLUTION RESPIRATORY (INHALATION) EVERY 6 HOURS PRN
Status: DISCONTINUED | OUTPATIENT
Start: 2025-02-04 | End: 2025-02-05 | Stop reason: HOSPADM

## 2025-02-04 RX ORDER — POLYETHYLENE GLYCOL 3350 17 G/17G
17 POWDER, FOR SOLUTION ORAL DAILY PRN
Status: DISCONTINUED | OUTPATIENT
Start: 2025-02-04 | End: 2025-02-05 | Stop reason: HOSPADM

## 2025-02-04 RX ADMIN — METOCLOPRAMIDE 10 MG: 5 INJECTION, SOLUTION INTRAMUSCULAR; INTRAVENOUS at 02:37

## 2025-02-04 RX ADMIN — Medication 10 ML: at 20:13

## 2025-02-04 RX ADMIN — Medication 10 ML: at 10:34

## 2025-02-04 RX ADMIN — SODIUM CHLORIDE 500 ML: 900 INJECTION, SOLUTION INTRAVENOUS at 04:20

## 2025-02-04 RX ADMIN — IBUPROFEN 800 MG: 100 SUSPENSION ORAL at 03:34

## 2025-02-04 RX ADMIN — OSELTAMIVIR PHOSPHATE 75 MG: 75 CAPSULE ORAL at 10:33

## 2025-02-04 RX ADMIN — ACETAMINOPHEN 650 MG: 325 TABLET, FILM COATED ORAL at 22:34

## 2025-02-04 RX ADMIN — ACETAMINOPHEN 650 MG: 650 SOLUTION ORAL at 10:33

## 2025-02-04 RX ADMIN — GUAIFENESIN 200 MG: 200 SOLUTION ORAL at 16:18

## 2025-02-04 RX ADMIN — ONDANSETRON 4 MG: 4 TABLET, ORALLY DISINTEGRATING ORAL at 16:18

## 2025-02-04 RX ADMIN — Medication 12.5 MG: at 10:33

## 2025-02-04 RX ADMIN — ASPIRIN 81 MG: 81 TABLET, COATED ORAL at 10:33

## 2025-02-04 RX ADMIN — Medication 12.5 MG: at 20:13

## 2025-02-04 RX ADMIN — ONDANSETRON 4 MG: 4 TABLET, ORALLY DISINTEGRATING ORAL at 22:33

## 2025-02-04 RX ADMIN — OSELTAMIVIR PHOSPHATE 75 MG: 75 CAPSULE ORAL at 20:13

## 2025-02-04 RX ADMIN — Medication 5 MG: at 22:34

## 2025-02-04 RX ADMIN — ACETAMINOPHEN 650 MG: 650 SOLUTION ORAL at 16:18

## 2025-02-04 NOTE — CASE MANAGEMENT/SOCIAL WORK
Discharge Planning Assessment   [facility]     Patient Name: Rachael Rodriguez  MRN: [id number]  Today's Date: 2/4/2025    Admit Date: 2/4/2025    Plan: From home with spouse   Discharge Needs Assessment       Row Name 02/04/25 1557       Living Environment    People in Home spouse    Name(s) of People in Home Spouse Angela Winn    Current Living Arrangements home    Potentially Unsafe Housing Conditions none    In the past 12 months has the electric, gas, oil, or water company threatened to shut off services in your home? No    Primary Care Provided by self    Provides Primary Care For no one    Family Caregiver if Needed spouse    Family Caregiver Names Spouse Pa    Quality of Family Relationships helpful;involved;supportive    Able to Return to Prior Arrangements yes       Resource/Environmental Concerns    Resource/Environmental Concerns none    Transportation Concerns none       Transportation Needs    In the past 12 months, has lack of transportation kept you from medical appointments or from getting medications? no    In the past 12 months, has lack of transportation kept you from meetings, work, or from getting things needed for daily living? No       Food Insecurity    Within the past 12 months, you worried that your food would run out before you got the money to buy more. Never true    Within the past 12 months, the food you bought just didn't last and you didn't have money to get more. Never true       Transition Planning    Patient/Family Anticipates Transition to home with family    Patient/Family Anticipated Services at Transition none    Transportation Anticipated car, drives self;family or friend will provide       Discharge Needs Assessment    Readmission Within the Last 30 Days no previous admission in last 30 days    Current Outpatient/Agency/Support Group meal delivery service  Meals On Wheels    Equipment Currently Used at Home oxygen;walker, standard;commode;bp cuff;pulse ox  Home O2 2L HS  per Sergey    Concerns to be Addressed no discharge needs identified;denies needs/concerns at this time    Do you want help finding or keeping work or a job? I do not need or want help    Do you want help with school or training? For example, starting or completing job training or getting a high school diploma, GED or equivalent No    Anticipated Changes Related to Illness none    Equipment Needed After Discharge none    Provided Post Acute Provider List? N/A    Provided Post Acute Provider Quality & Resource List? N/A    Offered/Gave Vendor List no                   Discharge Plan       Row Name 02/04/25 1600       Plan    Plan From home with spouse    Patient/Family in Agreement with Plan yes    Provided Post Acute Provider List? N/A    Provided Post Acute Provider Quality & Resource List? N/A    Plan Comments CM met with patient at the bedside to review discharge planning. Patient lives at home with spouse Pa and grandson, is assist with IADLs and drives. Spouse Pa to transport patient at d/c. Confirmed PCP, insurance, and pharmacy. Patient accepts [facility]. Patient denies any difficulty affording food, utilities, or medications. Patient is not current with any HHC/OPPT/OT services.                  Continued Care and Services - Admitted Since 2/4/2025       Durable Medical Equipment Coordination complete.      Service Provider Request Status Services Address Phone Fax Patient Preferred    [facility]  Selected Durable Medical Equipment [id number] [address] [phone] --                Demographic Summary       Row Name 02/04/25 2980       General Information    Admission Type inpatient    Arrived From emergency department    Required Notices Provided Important Message from Medicare    Referral Source admission list    Reason for Consult discharge planning    Preferred Language English       Contact Information    Permission Granted to Share Info With case  manager    Contact Information Obtained for                    Functional Status       Row Name 02/04/25 155       Functional Status    Usual Activity Tolerance good    Current Activity Tolerance moderate       Functional Status, IADL    Medications assistive equipment and person    Meal Preparation assistive equipment and person    Housekeeping assistive equipment and person    Laundry assistive equipment and person    Shopping assistive equipment and person    If for any reason you need help with day-to-day activities such as bathing, preparing meals, shopping, managing finances, etc., do you get the help you need? I get all the help I need       Mental Status    General Appearance WDL WDL       Mental Status Summary    Recent Changes in Mental Status/Cognitive Functioning no changes              Patient Forms       Row Name 02/04/25 1512       Patient Forms    Important Message from Medicare (IMM) Delivered  IMM per Reg 2/4             Mirna Jimenez RN     [phone]  [email or web address]

## 2025-02-04 NOTE — ED PROVIDER NOTES
Subjective   Chief Complaint   Patient presents with    Flu Symptoms       History of Present Illness  Patient is a 69-year-old very pleasant lady who arrives today with her  with reports of a CABG with Dr. Mcbride and Dr. Driscoll on January 14.  States that their grandson lives with them and has a viral respiratory illness, the flu, and she has been febrile coughing and having upper respiratory symptoms for 2 days.  She has tried over-the-counter Delsym and other medications with no relief.  She is unable to swallow pills but she did take 500 mg of Tylenol at around 8 PM which did not help her fever.  Any worsening chest pain crushing chest pain headache or dyspnea.  Review of Systems   Constitutional:  Positive for chills, fatigue and fever.   HENT:  Positive for congestion.    Eyes: Negative.    Respiratory:  Positive for cough. Negative for wheezing and stridor.    Cardiovascular: Negative.    Gastrointestinal:  Positive for abdominal pain and nausea.   Endocrine: Negative.    Genitourinary: Negative.    Musculoskeletal:  Positive for arthralgias and myalgias.   Skin: Negative.    Allergic/Immunologic: Negative.    Neurological: Negative.    Hematological: Negative.    Psychiatric/Behavioral: Negative.         Past Medical History:   Diagnosis Date    Hypertension        Allergies   Allergen Reactions    Sulfa Antibiotics GI Intolerance    Penicillins Rash       Past Surgical History:   Procedure Laterality Date    AORTIC VALVE REPAIR/REPLACEMENT N/A 1/14/2025    Procedure: AORTIC VALVE REPLACEMENT with 23mm Avalus Valve;  Surgeon: Zac Mcbride MD;  [facility];  Service: Cardiothoracic;  Laterality: N/A;    CARDIAC CATHETERIZATION Right 12/30/2024    Procedure: Right and Left Heart Cath;  Surgeon: Makayla Driscoll MD;  [facility];  Service: Cardiovascular;  Laterality: Right;  Local and IV sedation    TRANSESOPHAGEAL ECHOCARDIOGRAM (BENJAMIN) N/A 1/14/2025     Procedure: TRANSESOPHAGEAL ECHOCARDIOGRAM WITH ANESTHESIA;  Surgeon: Zac Mcbride MD;  Location: [facility];  Service: Cardiothoracic;  Laterality: N/A;       No family history on file.    Social History     Socioeconomic History    Marital status:    Tobacco Use    Smoking status: Never     Passive exposure: Never    Smokeless tobacco: Never   Vaping Use    Vaping status: Never Used   Substance and Sexual Activity    Alcohol use: Never    Drug use: Never    Sexual activity: Defer           Objective   Physical Exam  Vitals and nursing note reviewed.   Constitutional:       General: She is not in acute distress.     Appearance: Normal appearance. She is normal weight. She is ill-appearing. She is not toxic-appearing or diaphoretic.   HENT:      Head: Normocephalic and atraumatic.      Right Ear: External ear normal.      Left Ear: External ear normal.      Nose: Nose normal.      Mouth/Throat:      Mouth: Mucous membranes are moist.      Pharynx: Oropharynx is clear.   Eyes:      Extraocular Movements: Extraocular movements intact.      Conjunctiva/sclera: Conjunctivae normal.      Pupils: Pupils are equal, round, and reactive to light.   Cardiovascular:      Rate and Rhythm: Regular rhythm. Tachycardia present.      Pulses: Normal pulses.      Heart sounds: Normal heart sounds.   Pulmonary:      Effort: Pulmonary effort is normal.      Breath sounds: Normal breath sounds.   Abdominal:      General: Bowel sounds are normal.      Palpations: Abdomen is soft.   Musculoskeletal:         General: Normal range of motion.      Cervical back: Normal range of motion and neck supple.   Skin:     General: Skin is warm and dry.      Capillary Refill: Capillary refill takes less than 2 seconds.   Neurological:      General: No focal deficit present.      Mental Status: She is alert.   Psychiatric:         Mood and Affect: Mood normal.         Behavior: Behavior normal.         Thought Content: Thought content  "normal.         Judgment: Judgment normal.         Procedures           ED Course        /69 (BP Location: Right arm, Patient Position: Sitting)   Pulse 118   Temp (!) 102.3 °F (39.1 °C) (Oral)   Resp 20   Ht 144.8 cm (57\")   Wt 68 kg (150 lb)   SpO2 95%   BMI 32.46 kg/m²   Labs Reviewed   RESPIRATORY PANEL PCR W/ COVID-19 (SARS-COV-2), NP SWAB IN UTM/VTP, 2 HR TAT - Abnormal; Notable for the following components:       Result Value    Influenza A H1 2009 PCR Detected (*)     All other components within normal limits    Narrative:     In the setting of a positive respiratory panel with a viral infection PLUS a negative procalcitonin without other underlying concern for bacterial infection, consider observing off antibiotics or discontinuation of antibiotics and continue supportive care. If the respiratory panel is positive for atypical bacterial infection (Bordetella pertussis, Chlamydophila pneumoniae, or Mycoplasma pneumoniae), consider antibiotic de-escalation to target atypical bacterial infection.   COMPREHENSIVE METABOLIC PANEL - Abnormal; Notable for the following components:    Glucose 123 (*)     CO2 19.9 (*)     Anion Gap 15.1 (*)     All other components within normal limits    Narrative:     GFR Categories in Chronic Kidney Disease (CKD)      GFR Category          GFR (mL/min/1.73)    Interpretation  G1                     90 or greater         Normal or high (1)  G2                      60-89                Mild decrease (1)  G3a                   45-59                Mild to moderate decrease  G3b                   30-44                Moderate to severe decrease  G4                    15-29                Severe decrease  G5                    14 or less           Kidney failure          (1)In the absence of evidence of kidney disease, neither GFR category G1 or G2 fulfill the criteria for CKD.    eGFR calculation 2021 CKD-EPI creatinine equation, which does not include race as a factor "   CBC WITH AUTO DIFFERENTIAL - Abnormal; Notable for the following components:    Hemoglobin 11.6 (*)     MCH 26.5 (*)     Neutrophil % 76.3 (*)     Lymphocyte % 10.3 (*)     Immature Grans % 0.6 (*)     All other components within normal limits   RAINBOW DRAW    Narrative:     The following orders were created for panel order [place] Draw.  Procedure                               Abnormality         Status                     ---------                               -----------         ------                     Green Top (Gel)[[id number]]                                  Final result               Lavender Top[[id number]]                                     Final result               Gold Top - SST[[id number]]                                   Final result               Light Blue Top[[id number]]                                   Final result                 Please view results for these tests on the individual orders.   CBC AND DIFFERENTIAL    Narrative:     The following orders were created for panel order CBC & Differential.  Procedure                               Abnormality         Status                     ---------                               -----------         ------                     CBC Auto Differential[[id number]]        Abnormal            Final result                 Please view results for these tests on the individual orders.   GREEN TOP   LAVENDER TOP   GOLD TOP - SST   LIGHT BLUE TOP   EXTRA TUBES    Narrative:     The following orders were created for panel order Extra Tubes.  Procedure                               Abnormality         Status                     ---------                               -----------         ------                     Green Top (Gel)[[id number]]                                  Final result                 Please view results for these tests on the individual orders.   GREEN TOP     Medications   sodium chloride 0.9 % flush 10 mL (has no administration in time  range)   ibuprofen (ADVIL,MOTRIN) 100 MG/5ML suspension 800 mg (800 mg Oral Given 2/4/25 7644)   metoclopramide (REGLAN) injection 10 mg (10 mg Intravenous Given 2/4/25 0237)   sodium chloride 0.9 % bolus 500 mL (500 mL Intravenous New Bag 2/4/25 0420)     XR Chest 2 View    Result Date: 2/4/2025  Impression: 1.No acute cardiopulmonary abnormality. 2.Mild cardiomegaly. 3.Thoracic dextroscoliosis. Electronically Signed: Dayne aMrs MD  2/4/2025 2:27 AM EST  Workstation ID: [id number]                                                  Medical Decision Making  Problems Addressed:  Acute cough: complicated acute illness or injury  Fever, unspecified fever cause: complicated acute illness or injury  Influenza A: complicated acute illness or injury  S/P AVR: complicated acute illness or injury    Amount and/or Complexity of Data Reviewed  Radiology: ordered.  ECG/medicine tests: ordered.    Risk  Prescription drug management.  Decision regarding hospitalization.    Patient presents to the ED for the above complaint, underwent the above exam and workup.    EKG reviewed: EKG is reviewed and interpreted by Dr. Machado and myself shows sinus tachycardia with a rate of 119 left bundle branch block and ST elevation secondary to interventricular conduction delay QT of 355.  When compared to previous on 1/16/2025 which showed sinus rhythm with a rate of 75 and a QT of 387.    Imaging reviewed: Interpreted by Dr. Machado, ED attending.    XR Chest 2 View    Result Date: 2/4/2025  Impression: 1.No acute cardiopulmonary abnormality. 2.Mild cardiomegaly. 3.Thoracic dextroscoliosis. Electronically Signed: Dayne Mars MD  2/4/2025 2:27 AM EST  Workstation ID: [id number]     Reviewed records from CABG with Dr. Driscoll and Dr. Mcbride    Differential diagnosis considered: Influenza, COVID, pneumonia    Overall presentation is consistent with influenza A.  Low suspicion for COVID or pneumonia.    Patient was treated with the following  medications while in the ED;   Medications   sodium chloride 0.9 % flush 10 mL (has no administration in time range)   ibuprofen (ADVIL,MOTRIN) 100 MG/5ML suspension 800 mg (800 mg Oral Given 2/4/25 0334)   metoclopramide (REGLAN) injection 10 mg (10 mg Intravenous Given 2/4/25 3827)   sodium chloride 0.9 % bolus 500 mL (500 mL Intravenous New Bag 2/4/25 0420)     Upon evaluation of patient IV was established labs and imaging and EKG were obtained.  Results all as above.  Patient's grandson was sick with a viral illness so she did believe that she probably had the same thing.  She is positive for influenza A.  The Reglan did help with her nausea.  Fluid bolus because she is a little dry as well as liquid Motrin because she is unable to tolerate pills.  Upon evaluation of labs imaging EKG and discussion with patient, and discussion with Dr. Machado, decision made to admit patient for further monitoring related to recent AVR and influenza causing some slight hypoxia.    Discussed admission with [facility], who is agreeable to accepting patient for admission.          Final diagnoses:   Influenza A   Fever, unspecified fever cause   Acute cough   S/P AVR   Hypoxia       ED Disposition  ED Disposition       ED Disposition   Decision to Admit    Condition   --    Comment   Level of Care: Telemetry [5]   Admitting Physician: JOSEPH BOLDEN [[id number]]   Attending Physician: JOSEPH BOLDEN [[id number]]                 No follow-up provider specified.       Medication List        New Prescriptions      benzonatate 200 MG capsule  Commonly known as: TESSALON  Take 1 capsule by mouth 3 (Three) Times a Day As Needed for Cough for up to 3 days.               Where to Get Your Medications        These medications were sent to [facility] [id number] - [facility] [place] - [address] [place] [address] [address]  - [phone]  - [phone]  [facility] [address] [place] [address]      Phone: [phone]   benzonatate  200 MG capsule            Altagracia Hill, APRN  02/04/25 2387

## 2025-02-04 NOTE — Clinical Note
Level of Care: Telemetry [5]   Admitting Physician: JOSEPH BOLDEN [[id number]]   Attending Physician: JOSEPH BOLDEN [[id number]]

## 2025-02-04 NOTE — H&P
[facility] Medicine Services    Hospitalist History and Physical     Rachael Rodriguez : 1955 MRN:[id number] LOS:0 ROOM:      Reason for admission: Acute respiratory failure with hypoxia     Assessment / Plan     Acute respiratory failure with hypoxia  Influenza A  Fever  - O2 sats around 91% at rest on room air, add supplemental O2 to keep sats > 92%  - CXR: no acute cardiopulmonary abnormality, mild cardiomegaly, thoracic dextroscoliosis  - temp 102.3, HR 120s initially  - WBC normal  - RVP detected Influenza A  - given ibuprofen with improvement in temp and HR  - supportive care with antipyretics, antiemetics, robitussin, albuterol prn, encourage IS  - will check with pharmacy to see if tamiflu is available     Severe aortic stenosis s/p AVR and left atrial appendage closure with atrial clip 2025   Diastolic heart failure with LV ejection fraction of 45 to 50%  - EKG: sinus tachycardia, LBBB  - cont home aspirin, metoprolol  - cont home tramadol prn       Nutrition:   Diet: Cardiac; Healthy Heart (2-3 Na+); Fluid Consistency: Thin (IDDSI 0)     VTE Prophylaxis:  Mechanical VTE prophylaxis orders are present.         History of Present illness     Rachael Rodriguez is a 69 y.o. female with PMH of severe aortic stenosis s/p AVR and left atrial appendage closure with atrial clip 2025 with complaints of cough, congestion, fever and known exposure to the flu from grandson but he had been staying away down in the basement. She stated she was feeling ok until about 7pm last night. She suddenly felt terrible. She was sent home on 2L O2 nightly after her surgery but has not had to use it the last 3-4 nights.     In the ED she was found to have Influenza A, temp 102.3, HR 120s, room air O2 saturation hanging around 91% at rest. CXR showed no acute cardiopulmonary abnormality, mild cardiomegaly, thoracic dextroscoliosis. She was given 500cc IVFs, ibuprofen, and reglan. She was admitted for  further treatment of Influenza A with hypoxia. Pt's , temp down to 99.7 on exam.    Subjective / Review of systems     Review of Systems   Constitutional:  Positive for fatigue and fever.   HENT: Negative.     Eyes: Negative.    Respiratory:  Positive for cough and shortness of breath.    Cardiovascular: Negative.    Gastrointestinal: Negative.    Genitourinary: Negative.    Musculoskeletal: Negative.    Skin: Negative.    Neurological: Negative.    Psychiatric/Behavioral: Negative.          Past Medical/Surgical/Social/Family History & Allergies     Past Medical History:   Diagnosis Date    Hypertension       Past Surgical History:   Procedure Laterality Date    AORTIC VALVE REPAIR/REPLACEMENT N/A 1/14/2025    Procedure: AORTIC VALVE REPLACEMENT with 23mm Avalus Valve;  Surgeon: Zac Mcbride MD;  Location: [facility];  Service: Cardiothoracic;  Laterality: N/A;    CARDIAC CATHETERIZATION Right 12/30/2024    Procedure: Right and Left Heart Cath;  Surgeon: Makayla Driscoll MD;  Location: [facility] CATH INVASIVE LOCATION;  Service: Cardiovascular;  Laterality: Right;  Local and IV sedation    TRANSESOPHAGEAL ECHOCARDIOGRAM (BENJAMIN) N/A 1/14/2025    Procedure: TRANSESOPHAGEAL ECHOCARDIOGRAM WITH ANESTHESIA;  Surgeon: Zac Mcbride MD;  Location: [facility];  Service: Cardiothoracic;  Laterality: N/A;      Social History     Socioeconomic History    Marital status:    Tobacco Use    Smoking status: Never     Passive exposure: Never    Smokeless tobacco: Never   Vaping Use    Vaping status: Never Used   Substance and Sexual Activity    Alcohol use: Never    Drug use: Never    Sexual activity: Defer      No family history on file.   Allergies   Allergen Reactions    Sulfa Antibiotics GI Intolerance    Penicillins Rash      Social Drivers of Health     Tobacco Use: Low Risk  (1/15/2025)    Patient History     Smoking Tobacco Use: Never     Smokeless Tobacco Use: Never     Passive Exposure: Never    Alcohol Use: Not At Risk (1/14/2025)    AUDIT-C     Frequency of Alcohol Consumption: Never     Average Number of Drinks: Patient does not drink     Frequency of Binge Drinking: Never   Financial Resource Strain: Not on file   Food Insecurity: No Food Insecurity (1/14/2025)    Hunger Vital Sign     Worried About Running Out of Food in the Last Year: Never true     Ran Out of Food in the Last Year: Never true   Transportation Needs: No Transportation Needs (1/14/2025)    PRAPARE - Transportation     Lack of Transportation (Medical): No     Lack of Transportation (Non-Medical): No   Physical Activity: Inactive (1/14/2025)    Exercise Vital Sign     Days of Exercise per Week: 0 days     Minutes of Exercise per Session: 0 min   Stress: Not on file   Social Connections: Unknown (1/14/2025)    Family and Community Support     Help with Day-to-Day Activities: I don't need any help     Lonely or Isolated: Not on file   Interpersonal Safety: Not At Risk (2/4/2025)    Abuse Screen     Unsafe at Home or Work/School: no     Feels Threatened by Someone?: no     Does Anyone Keep You from Contacting Others or Doint Things Outside the Home?: no     Physical Sign of Abuse Present: no   Depression: Not on file   Housing Stability: Not At Risk (1/14/2025)    Housing Stability     Current Living Arrangements: home     Potentially Unsafe Housing Conditions: none   Utilities: Not At Risk (1/14/2025)    [facility] Utilities     Threatened with loss of utilities: No   Health Literacy: Not At Risk (1/14/2025)    Education     Help with school or training?: No     Preferred Language: English   Employment: Not At Risk (12/30/2024)    Employment     Do you want help finding or keeping work or a job?: I do not need or want help   Disabilities: Not At Risk (1/14/2025)    Disabilities     Concentrating, Remembering, or Making Decisions Difficulty: no     Doing Errands Independently Difficulty: no        Home Medications     Prior to Admission  medications    Medication Sig Start Date End Date Taking? Authorizing Provider   acetaminophen (TYLENOL) 325 MG tablet Take 2 tablets by mouth Every 6 (Six) Hours As Needed for Mild Pain or Moderate Pain. 1/18/25   Jay Jay Cleaning MD   aspirin 81 MG EC tablet Take 1 tablet by mouth Daily for 90 days. 1/18/25 4/18/25  Jay Jay Cleaning MD   benzonatate (TESSALON) 200 MG capsule Take 1 capsule by mouth 3 (Three) Times a Day As Needed for Cough for up to 3 days. 2/4/25 2/7/25  Altagracia Hill APRN   melatonin 5 MG tablet tablet Take  by mouth At Night As Needed.    Provider, MD Ian   metoprolol tartrate (LOPRESSOR) 25 MG tablet Take 0.5 tablets by mouth Every 12 (Twelve) Hours. 1/18/25   Jay Jay Cleaning MD   traMADol (ULTRAM) 50 MG tablet Take 1 tablet by mouth Every 8 (Eight) Hours As Needed for Moderate Pain. 1/20/25   Thalia Mora APRN        Objective / Physical Exam     Vital signs:  Temp: 99.7 °F (37.6 °C)  BP: 136/75  Heart Rate: 105  Resp: 20  SpO2: 95 %  Weight: 68 kg (150 lb)    Admission Weight: Weight: 68 kg (150 lb)    Physical Exam  Vitals and nursing note reviewed.   HENT:      Head: Normocephalic and atraumatic.   Eyes:      Extraocular Movements: Extraocular movements intact.      Pupils: Pupils are equal, round, and reactive to light.   Cardiovascular:      Rate and Rhythm: Regular rhythm. Tachycardia present.      Pulses: Normal pulses.      Heart sounds: Murmur heard.   Pulmonary:      Effort: Pulmonary effort is normal.      Breath sounds: Normal breath sounds.   Abdominal:      General: Bowel sounds are normal.      Palpations: Abdomen is soft.      Tenderness: There is no abdominal tenderness.   Musculoskeletal:         General: Normal range of motion.   Skin:     General: Skin is warm and dry.   Neurological:      Mental Status: She is alert and oriented to person, place, and time.   Psychiatric:         Mood and Affect: Mood normal.          Behavior: Behavior normal.          Labs     Results from last 7 days   Lab Units 02/04/25  0228   WBC 10*3/mm3 6.82   HEMOGLOBIN g/dL 11.6*   HEMATOCRIT % 36.7   PLATELETS 10*3/mm3 383      Results from last 7 days   Lab Units 02/04/25  0228   ALK PHOS U/L 104   AST (SGOT) U/L 26   ALT (SGPT) U/L 12           Results from last 7 days   Lab Units 02/04/25 0228   SODIUM mmol/L 136   POTASSIUM mmol/L 3.9   CHLORIDE mmol/L 101   CO2 mmol/L 19.9*   BUN mg/dL 9   CREATININE mg/dL 0.67   GLUCOSE mg/dL 123*        Imaging     XR Chest 2 View    Result Date: 2/4/2025  XR CHEST 2 VW Date of Exam: 2/4/2025 2:09 AM EST Indication: recent CABG, cough, cp Comparison: 1/16/2025. Findings: There is thoracic dextroscoliosis. Patient is status post median sternotomy and CABG. Heart is mildly enlarged. There is straightening of the thoracic kyphosis. Pulmonary vasculature is within normal limits. No pneumothorax, pleural effusion or focal airspace consolidation.     Impression: 1.No acute cardiopulmonary abnormality. 2.Mild cardiomegaly. 3.Thoracic dextroscoliosis. Electronically Signed: Dayne Mars MD  2/4/2025 2:27 AM EST  Workstation ID: [id number]      Current Medications     Scheduled Meds:  aspirin, 81 mg, Oral, Daily  metoprolol tartrate, 12.5 mg, Oral, Q12H  oseltamivir, 75 mg, Oral, Q12H  sodium chloride, 10 mL, Intravenous, Q12H         Continuous Infusions:  Pharmacy to Dose Oseltamivir (TAMIFLU),            [facility]  02/04/25   05:51 EST

## 2025-02-05 ENCOUNTER — READMISSION MANAGEMENT (OUTPATIENT)
Dept: CALL CENTER | Facility: HOSPITAL | Age: 70
End: 2025-02-05
Payer: MEDICARE

## 2025-02-05 VITALS
SYSTOLIC BLOOD PRESSURE: 124 MMHG | BODY MASS INDEX: 30.96 KG/M2 | DIASTOLIC BLOOD PRESSURE: 67 MMHG | OXYGEN SATURATION: 93 % | HEIGHT: 57 IN | WEIGHT: 143.52 LBS | RESPIRATION RATE: 17 BRPM | TEMPERATURE: 98.8 F | HEART RATE: 77 BPM

## 2025-02-05 LAB
ANION GAP SERPL CALCULATED.3IONS-SCNC: 12.2 MMOL/L (ref 5–15)
BASOPHILS # BLD AUTO: 0.02 10*3/MM3 (ref 0–0.2)
BASOPHILS NFR BLD AUTO: 0.5 % (ref 0–1.5)
BUN SERPL-MCNC: 10 MG/DL (ref 8–23)
BUN/CREAT SERPL: 14.5 (ref 7–25)
CALCIUM SPEC-SCNC: 9.5 MG/DL (ref 8.6–10.5)
CHLORIDE SERPL-SCNC: 104 MMOL/L (ref 98–107)
CO2 SERPL-SCNC: 22.8 MMOL/L (ref 22–29)
CREAT SERPL-MCNC: 0.69 MG/DL (ref 0.57–1)
DEPRECATED RDW RBC AUTO: 40.6 FL (ref 37–54)
EGFRCR SERPLBLD CKD-EPI 2021: 94.7 ML/MIN/1.73
EOSINOPHIL # BLD AUTO: 0.01 10*3/MM3 (ref 0–0.4)
EOSINOPHIL NFR BLD AUTO: 0.2 % (ref 0.3–6.2)
ERYTHROCYTE [DISTWIDTH] IN BLOOD BY AUTOMATED COUNT: 13.6 % (ref 12.3–15.4)
GLUCOSE SERPL-MCNC: 84 MG/DL (ref 65–99)
HCT VFR BLD AUTO: 33.6 % (ref 34–46.6)
HGB BLD-MCNC: 10.8 G/DL (ref 12–15.9)
IMM GRANULOCYTES # BLD AUTO: 0.02 10*3/MM3 (ref 0–0.05)
IMM GRANULOCYTES NFR BLD AUTO: 0.5 % (ref 0–0.5)
LYMPHOCYTES # BLD AUTO: 1.28 10*3/MM3 (ref 0.7–3.1)
LYMPHOCYTES NFR BLD AUTO: 31.4 % (ref 19.6–45.3)
MCH RBC QN AUTO: 26.3 PG (ref 26.6–33)
MCHC RBC AUTO-ENTMCNC: 32.1 G/DL (ref 31.5–35.7)
MCV RBC AUTO: 82 FL (ref 79–97)
MONOCYTES # BLD AUTO: 0.89 10*3/MM3 (ref 0.1–0.9)
MONOCYTES NFR BLD AUTO: 21.8 % (ref 5–12)
NEUTROPHILS NFR BLD AUTO: 1.86 10*3/MM3 (ref 1.7–7)
NEUTROPHILS NFR BLD AUTO: 45.6 % (ref 42.7–76)
NRBC BLD AUTO-RTO: 0 /100 WBC (ref 0–0.2)
PLATELET # BLD AUTO: 292 10*3/MM3 (ref 140–450)
PMV BLD AUTO: 9.8 FL (ref 6–12)
POTASSIUM SERPL-SCNC: 3.6 MMOL/L (ref 3.5–5.2)
PROCALCITONIN SERPL-MCNC: 0.05 NG/ML (ref 0–0.25)
RBC # BLD AUTO: 4.1 10*6/MM3 (ref 3.77–5.28)
SODIUM SERPL-SCNC: 139 MMOL/L (ref 136–145)
WBC NRBC COR # BLD AUTO: 4.08 10*3/MM3 (ref 3.4–10.8)

## 2025-02-05 PROCEDURE — 85025 COMPLETE CBC W/AUTO DIFF WBC: CPT | Performed by: NURSE PRACTITIONER

## 2025-02-05 PROCEDURE — 84145 PROCALCITONIN (PCT): CPT | Performed by: INTERNAL MEDICINE

## 2025-02-05 PROCEDURE — 80048 BASIC METABOLIC PNL TOTAL CA: CPT | Performed by: NURSE PRACTITIONER

## 2025-02-05 PROCEDURE — 97161 PT EVAL LOW COMPLEX 20 MIN: CPT

## 2025-02-05 RX ORDER — GUAIFENESIN 600 MG/1
600 TABLET, EXTENDED RELEASE ORAL EVERY 12 HOURS SCHEDULED
Status: ON HOLD
Start: 2025-02-05 | End: 2025-02-06

## 2025-02-05 RX ORDER — OSELTAMIVIR PHOSPHATE 75 MG/1
75 CAPSULE ORAL EVERY 12 HOURS SCHEDULED
Qty: 7 CAPSULE | Refills: 0 | Status: ON HOLD | OUTPATIENT
Start: 2025-02-05 | End: 2025-02-06

## 2025-02-05 RX ORDER — ONDANSETRON 4 MG/1
4 TABLET, ORALLY DISINTEGRATING ORAL EVERY 6 HOURS PRN
Qty: 20 TABLET | Refills: 0 | Status: ON HOLD | OUTPATIENT
Start: 2025-02-05 | End: 2025-02-06

## 2025-02-05 RX ORDER — GUAIFENESIN 600 MG/1
600 TABLET, EXTENDED RELEASE ORAL EVERY 12 HOURS SCHEDULED
Status: DISCONTINUED | OUTPATIENT
Start: 2025-02-05 | End: 2025-02-05 | Stop reason: HOSPADM

## 2025-02-05 RX ADMIN — OSELTAMIVIR PHOSPHATE 75 MG: 75 CAPSULE ORAL at 09:06

## 2025-02-05 RX ADMIN — Medication 12.5 MG: at 09:06

## 2025-02-05 RX ADMIN — Medication 10 ML: at 09:07

## 2025-02-05 RX ADMIN — ASPIRIN 81 MG: 81 TABLET, COATED ORAL at 09:06

## 2025-02-05 NOTE — THERAPY EVALUATION
Patient Name: Rachael Rodriguez  : 1955    MRN: [id number]                              Today's Date: 2025       Admit Date: 2025    Visit Dx:     ICD-10-CM ICD-9-CM   1. Influenza A  J10.1 487.1   2. Fever, unspecified fever cause  R50.9 780.60   3. Acute cough  R05.1 786.2   4. S/P AVR  Z95.2 V43.3   5. Hypoxia  R09.02 799.02     Patient Active Problem List   Diagnosis    CHF (congestive heart failure)    Multifocal pneumonia    LBBB (left bundle branch block)    Pneumonia, unspecified organism    Acute on chronic HFrEF (heart failure with reduced ejection fraction)    Nonrheumatic aortic valve stenosis    Nonrheumatic aortic (valve) stenosis    S/P tissue AVR with left atrial appendage ligation per Dr. Mcbride 2025    Acute respiratory failure with hypoxia     Past Medical History:   Diagnosis Date    Hypertension      Past Surgical History:   Procedure Laterality Date    AORTIC VALVE REPAIR/REPLACEMENT N/A 2025    Procedure: AORTIC VALVE REPLACEMENT with 23mm Avalus Valve;  Surgeon: Zac Mcbride MD;  Location: [facility];  Service: Cardiothoracic;  Laterality: N/A;    CARDIAC CATHETERIZATION Right 2024    Procedure: Right and Left Heart Cath;  Surgeon: Makayla Driscoll MD;  Location: [facility] CATH INVASIVE LOCATION;  Service: Cardiovascular;  Laterality: Right;  Local and IV sedation    TRANSESOPHAGEAL ECHOCARDIOGRAM (BENJAMIN) N/A 2025    Procedure: TRANSESOPHAGEAL ECHOCARDIOGRAM WITH ANESTHESIA;  Surgeon: Zac Mcbride MD;  Location: [facility];  Service: Cardiothoracic;  Laterality: N/A;      General Information       Row Name 25 1603          Physical Therapy Time and Intention    Document Type evaluation  -BR     Mode of Treatment physical therapy  -BR       Row Name 25 1603          General Information    Patient Profile Reviewed yes  -BR     Prior Level of Function independent:;all household mobility;gait;transfer  -BR     Existing  Precautions/Restrictions cardiac;sternal  -BR       Row Name 02/05/25 1603          Living Environment    People in Home spouse  -BR       Row Name 02/05/25 1603          Home Main Entrance    Number of Stairs, Main Entrance two  -BR       Row Name 02/05/25 1603          Stairs Within Home, Primary    Number of Stairs, Within Home, Primary none  -BR       Row Name 02/05/25 1603          Cognition    Orientation Status (Cognition) oriented x 4  -BR               User Key  (r) = Recorded By, (t) = Taken By, (c) = Cosigned By      Initials Name Provider Type    Joan Ramirez PT Physical Therapist                   Mobility       Row Name 02/05/25 1604          Bed Mobility    Bed Mobility supine-sit  -BR     Supine-Sit Luce (Bed Mobility) contact guard  -BR     Comment, (Bed Mobility) Pt followed sternal precautions  -BR       Row Name 02/05/25 1604          Sit-Stand Transfer    Sit-Stand Luce (Transfers) standby assist  -BR       Row Name 02/05/25 1604          Gait/Stairs (Locomotion)    Luce Level (Gait) standby assist  -BR     Assistive Device (Gait) walker, front-wheeled  -BR     Patient was able to Ambulate yes  -BR     Distance in Feet (Gait) 200  -BR               User Key  (r) = Recorded By, (t) = Taken By, (c) = Cosigned By      Initials Name Provider Type    Joan Ramirez PT Physical Therapist                   Obj/Interventions       Row Name 02/05/25 1604          Range of Motion Comprehensive    General Range of Motion no range of motion deficits identified  -BR       Row Name 02/05/25 1604          Strength Comprehensive (MMT)    General Manual Muscle Testing (MMT) Assessment no strength deficits identified  -BR       Row Name 02/05/25 1604          Balance    Balance Assessment standing static balance  -BR     Static Standing Balance standby assist  -BR     Position/Device Used, Standing Balance supported;walker, rolling  -BR       Row Name 02/05/25 1604           Sensory Assessment (Somatosensory)    Sensory Assessment (Somatosensory) LE sensation intact  -BR               User Key  (r) = Recorded By, (t) = Taken By, (c) = Cosigned By      Initials Name Provider Type    BR Joan Aguilera, PT Physical Therapist                   Goals/Plan    No documentation.                  Clinical Impression       Row Name 02/05/25 1605          Pain    Pretreatment Pain Rating 0/10 - no pain  -BR     Posttreatment Pain Rating 0/10 - no pain  -BR       Row Name 02/05/25 1605          Plan of Care Review    Plan of Care Reviewed With patient;spouse  -BR     Outcome Evaluation Rachael Rodriguez is a 69 y.o. female with PMH of severe aortic stenosis s/p AVR and left atrial appendage closure with atrial clip 1/14/2025 with complaints of cough, congestion, fever, Pt tested + for influenza. Pt has sternal precautions. PT adjusted pt's heart hugger. Pt resides with spouse in [facility] with 2 NARCISO and she has been using rolling walker since her recent OHS. Pt transferred supine.sit with CGA of 1 and she ambulated 200 feet with rolling walker with SBA of 1. Pt wore mask out of her room in hallway. Pt is at her baseline for mobility. Pt demonstrted understanding of sternal precautions. PT recommendation is Home with Assist.  -BR       Row Name 02/05/25 1605          Therapy Assessment/Plan (PT)    Criteria for Skilled Interventions Met (PT) no;no problems identified which require skilled intervention  -BR     Therapy Frequency (PT) evaluation only  -BR       Row Name 02/05/25 1605          Vital Signs    Pre Systolic BP Rehab 144  -BR     Pre Treatment Diastolic BP 81  -BR     Pretreatment Heart Rate (beats/min) 79  -BR     Pre SpO2 (%) 92  -BR     O2 Delivery Pre Treatment room air  -BR     Pre Patient Position Supine  -BR     Intra Patient Position Standing  -BR     Post Patient Position Sitting  -BR       Row Name 02/05/25 1605          Positioning and Restraints    Pre-Treatment Position in bed  -BR      Post Treatment Position chair  -BR     In Chair notified nsg;reclined;call light within reach;encouraged to call for assist;with family/caregiver  -BR               User Key  (r) = Recorded By, (t) = Taken By, (c) = Cosigned By      Initials Name Provider Type    Joan Ramirez PT Physical Therapist                   Outcome Measures       Row Name 02/05/25 1609 02/05/25 0845       How much help from another person do you currently need...    Turning from your back to your side while in flat bed without using bedrails? 3  -BR 4  -HJ    Moving from lying on back to sitting on the side of a flat bed without bedrails? 3  -BR 4  -HJ    Moving to and from a bed to a chair (including a wheelchair)? 4  -BR 4  -HJ    Standing up from a chair using your arms (e.g., wheelchair, bedside chair)? 4  -BR 4  -HJ    Climbing 3-5 steps with a railing? 3  -BR 3  -HJ    To walk in hospital room? 4  -BR 3  -HJ    AM-PAC 6 Clicks Score (PT) 21  -BR 22  -HJ    Highest Level of Mobility Goal 6 --> Walk 10 steps or more  -BR 7 --> Walk 25 feet or more  -HJ      Row Name 02/05/25 1609          Functional Assessment    Outcome Measure Options AM-PAC 6 Clicks Basic Mobility (PT)  -BR               User Key  (r) = Recorded By, (t) = Taken By, (c) = Cosigned By      Initials Name Provider Type     Lindsay Garcia, RN Registered Nurse    Joan Ramirez, PT Physical Therapist                                 Physical Therapy Education       Title: PT OT SLP Therapies (Done)       Topic: Physical Therapy (Done)       Point: Mobility training (Done)       Learning Progress Summary            Patient Acceptance, E,D, VU,DU by KELSI at 2/5/2025 1609   Family Acceptance, E,D, VU,DU by KELSI at 2/5/2025 1609                      Point: Home exercise program (Resolved)       Learner Progress:  Not documented in this visit.              Point: Body mechanics (Done)       Learning Progress Summary            Patient Acceptance, E,D, VU,DU by  BR at 2/5/2025 1609   Family Acceptance, E,D, VU,DU by BR at 2/5/2025 1609                      Point: Precautions (Done)       Learning Progress Summary            Patient Acceptance, E,D, VU,DU by BR at 2/5/2025 1609   Family Acceptance, E,D, VU,DU by BR at 2/5/2025 1609                                      User Key       Initials Effective Dates Name Provider Type Discipline    BR 02/01/22 -  Joan Aguilera, PT Physical Therapist PT                  PT Recommendation and Plan     Outcome Evaluation: Rachael Rodriguez is a 69 y.o. female with PMH of severe aortic stenosis s/p AVR and left atrial appendage closure with atrial clip 1/14/2025 with complaints of cough, congestion, fever, Pt tested + for influenza. Pt has sternal precautions. PT adjusted pt's heart hugger. Pt resides with spouse in [facility] with 2 NARCISO and she has been using rolling walker since her recent OHS. Pt transferred supine.sit with CGA of 1 and she ambulated 200 feet with rolling walker with SBA of 1. Pt wore mask out of her room in hallway. Pt is at her baseline for mobility. Pt demonstrted understanding of sternal precautions. PT recommendation is Home with Assist.     Time Calculation:         PT Charges       Row Name 02/05/25 1609             Time Calculation    Start Time 1004  -BR      Stop Time 1028  -BR      Time Calculation (min) 24 min  -BR      PT Received On 02/05/25  -BR         Time Calculation- PT    Total Timed Code Minutes- PT 0 minute(s)  -BR                User Key  (r) = Recorded By, (t) = Taken By, (c) = Cosigned By      Initials Name Provider Type    BR Joan Aguilera, PT Physical Therapist                  Therapy Charges for Today       Code Description Service Date Service Provider Modifiers Qty    67251296081 HC PT EVAL LOW COMPLEXITY 4 2/5/2025 Joan Aguilera, PT GP 1            PT G-Codes  Outcome Measure Options: AM-PAC 6 Clicks Basic Mobility (PT)  AM-PAC 6 Clicks Score (PT): 21  PT Discharge  Summary  Anticipated Discharge Disposition (PT): home with assist    Joan Aguilera, PT  2/5/2025

## 2025-02-05 NOTE — PLAN OF CARE
Goal Outcome Evaluation:  Plan of Care Reviewed With: patient        Progress: no change  Outcome Evaluation: Rested well during the shift. Tylenol given for fever. Remains on room air. Spouse at bedside. Up to BR with assist x1. Will continue to monitor.

## 2025-02-05 NOTE — PLAN OF CARE
Goal Outcome Evaluation:              Patient alert and oriented. Plan to dc home today.

## 2025-02-05 NOTE — PLAN OF CARE
Goal Outcome Evaluation:  Plan of Care Reviewed With: patient, spouse  Rachael Rodriguez is a 69 y.o. female with PMH of severe aortic stenosis s/p AVR and left atrial appendage closure with atrial clip 1/14/2025 with complaints of cough, congestion, fever, Pt tested + for influenza. Pt has sternal precautions. PT adjusted pt's heart hugger. Pt resides with spouse in [facility] with 2 NARCISO and she has been using rolling walker since her recent OHS. Pt transferred supine.sit with CGA of 1 and she ambulated 200 feet with rolling walker with SBA of 1. Pt wore mask out of her room in hallway. Pt is at her baseline for mobility. Pt demonstrted understanding of sternal precautions. PT recommendation is Home with Assist.              Anticipated Discharge Disposition (PT): home with assist

## 2025-02-05 NOTE — DISCHARGE SUMMARY
Hospitalist Service   DISCHARGE SUMMARY    Patient Name: Rachael Rodriguez  : 1955  MRN: [id number]    Date of Admission: 2025  Date of Discharge:  25    Primary Care Physician: Alvaro Cardona MD      Presenting Problem:   Influenza A [J10.1]  Hypoxia [R09.02]  S/P AVR [Z95.2]  Acute respiratory failure with hypoxia [J96.01]  Fever, unspecified fever cause [R50.9]  Acute cough [R05.1]    Active and Resolved Hospital Problems:  Active Hospital Problems    Diagnosis POA    **Acute respiratory failure with hypoxia [J96.01] Yes      Resolved Hospital Problems   No resolved problems to display.         Hospital Course     Hospital Course:  Rachael Rodriguez is a 69 y.o. female with PMH of severe aortic stenosis s/p AVR and left atrial appendage closure with atrial clip 2025 with complaints of cough, congestion, fever and known exposure to the flu from grandson but he had been staying away down in the basement. She stated she was feeling ok until about 7pm last night. She suddenly felt terrible. She was sent home on 2L O2 nightly after her surgery but has not had to use it the last 3-4 nights. In the ED she was found to have Influenza A, temp 102.3, HR 120s, room air O2 saturation hanging around 91% at rest. CXR showed no acute cardiopulmonary abnormality, mild cardiomegaly, thoracic dextroscoliosis. She was given 500cc IVFs, ibuprofen, and reglan. She was admitted for further treatment of Influenza A with hypoxia.  Hypoxia resolved, remains on room air currently.  Overall improved, no signs of any acute bacterial pneumonia noted at this time.  Continue Tamiflu on discharge to finish of the course.  Patient already has an appointment with CT surgery Dr. Mcbride next week coming up.  Discussed with patient and family to return to hospital if symptoms worsen.  Overall patient feels better and wants to go home today.  Patient is medically stable to discharge home with follow-up with PCP as  an outpatient.        DISCHARGE Follow Up Recommendations for labs and diagnostics:   Follow-up with PCP next 1 week.  Follow-up with scheduled appointment with Dr. Mcbride next week.    Day of Discharge     Vital Signs:  Temp:  [98.1 °F (36.7 °C)-101.1 °F (38.4 °C)] 99.8 °F (37.7 °C)  Heart Rate:  [81-96] 81  Resp:  [15-22] 16  BP: (117-146)/(71-81) 144/81    Physical Exam:  General: Awake, alert, NAD  Eyes: PERRL, EOMI, conjunctivae are clear  Cardiovascular: Regular rate and rhythm, no murmurs  Respiratory: Clear to auscultation bilaterally, no wheezing or rales, unlabored breathing  Abdomen: Soft, nontender, positive bowel sounds, no guarding  Neurologic: A&O, CN grossly intact, moves all extremities spontaneously  Musculoskeletal: Normal range of motion, no other gross deformities  Skin: Warm, dry        Pertinent  and/or Most Recent Results     LAB RESULTS:      Lab 02/05/25  0348 02/04/25 0228   WBC 4.08 6.82   HEMOGLOBIN 10.8* 11.6*   HEMATOCRIT 33.6* 36.7   PLATELETS 292 383   NEUTROS ABS 1.86 5.21   IMMATURE GRANS (ABS) 0.02 0.04   LYMPHS ABS 1.28 0.70   MONOS ABS 0.89 0.77   EOS ABS 0.01 0.04   MCV 82.0 84.0   PROCALCITONIN 0.05  --          Lab 02/05/25  0348 02/04/25 0228   SODIUM 139 136   POTASSIUM 3.6 3.9   CHLORIDE 104 101   CO2 22.8 19.9*   ANION GAP 12.2 15.1*   BUN 10 9   CREATININE 0.69 0.67   EGFR 94.7 95.3   GLUCOSE 84 123*   CALCIUM 9.5 10.1         Lab 02/04/25  0228   TOTAL PROTEIN 7.4   ALBUMIN 4.3   GLOBULIN 3.1   ALT (SGPT) 12   AST (SGOT) 26   BILIRUBIN 0.3   ALK PHOS 104                     Brief Urine Lab Results  (Last result in the past 365 days)        Color   Clarity   Blood   Leuk Est   Nitrite   Protein   CREAT   Urine HCG        01/13/25 0751 Yellow   Clear   Negative   Trace   Negative   Negative                 Microbiology Results (last 10 days)       Procedure Component Value - Date/Time    Respiratory Panel PCR w/COVID-19(SARS-CoV-2) PHI/ANAYA/PREETI/PAD/COR/TATE In-House, NP  Swab in UTM/VTM, 2 HR TAT - Swab, Nasopharynx [[id number]]  (Abnormal) Collected: 02/04/25 0229    Lab Status: Final result Specimen: Swab from Nasopharynx Updated: 02/04/25 0327     ADENOVIRUS, PCR Not Detected     Coronavirus 229E Not Detected     Coronavirus HKU1 Not Detected     Coronavirus NL63 Not Detected     Coronavirus OC43 Not Detected     COVID19 Not Detected     Human Metapneumovirus Not Detected     Human Rhinovirus/Enterovirus Not Detected     Influenza A H1 2009 PCR Detected     Influenza B PCR Not Detected     Parainfluenza Virus 1 Not Detected     Parainfluenza Virus 2 Not Detected     Parainfluenza Virus 3 Not Detected     Parainfluenza Virus 4 Not Detected     RSV, PCR Not Detected     Bordetella pertussis pcr Not Detected     Bordetella parapertussis PCR Not Detected     Chlamydophila pneumoniae PCR Not Detected     Mycoplasma pneumo by PCR Not Detected    Narrative:      In the setting of a positive respiratory panel with a viral infection PLUS a negative procalcitonin without other underlying concern for bacterial infection, consider observing off antibiotics or discontinuation of antibiotics and continue supportive care. If the respiratory panel is positive for atypical bacterial infection (Bordetella pertussis, Chlamydophila pneumoniae, or Mycoplasma pneumoniae), consider antibiotic de-escalation to target atypical bacterial infection.            XR Chest 2 View    Result Date: 2/4/2025  Impression: Impression: 1.No acute cardiopulmonary abnormality. 2.Mild cardiomegaly. 3.Thoracic dextroscoliosis. Electronically Signed: Dayne Mars MD  2/4/2025 2:27 AM EST  Workstation ID: [id number]    XR Chest 1 View    Result Date: 1/16/2025  Impression: Impression: Interval removal of left-sided chest tubes and right IJ central venous catheter. No pneumothorax. Mild right lower lobe linear atelectasis and atelectasis along the left lung base. Electronically Signed: Donn Cartagena MD  1/16/2025 3:00 PM  EST  Workstation ID: [id number]    XR Chest 1 View    Result Date: 1/16/2025  Impression: Impression: 1. Removal of [place]-Oscar catheter with right IJ vascular sheath in place. 2. Stable chest tubes. No pneumothorax. 3. Unchanged mild left basilar atelectasis. Electronically Signed: Nitin Evans MD  1/16/2025 7:22 AM EST  Workstation ID: [id number]    XR Chest 1 View    Result Date: 1/15/2025  Impression: Impression: 1.Postsurgical changes of recent cardiac surgery with mild left basilar atelectasis. No pneumothorax. 2.Support devices appear in expected position. Electronically Signed: Loi Landers  1/15/2025 8:24 AM EST  Workstation ID: [id number]    XR Chest 1 View    Result Date: 1/14/2025  Impression: Impression: Expected postoperative appearance status post CABG and left atrial ligation. Electronically Signed: Edwin Gold MD  1/14/2025 4:08 PM EST  Workstation ID: [id number]    XR Chest PA & Lateral    Result Date: 1/13/2025  Impression: Impression: No acute cardiopulmonary findings. S-shaped thoracolumbar scoliosis. Electronically Signed: [place] Caro MD  1/13/2025 8:56 AM EST  Workstation ID: [id number]     Results for orders placed during the hospital encounter of 01/13/25    Bilateral Carotid Duplex    Interpretation Summary    Right internal carotid artery demonstrates normal flow without evidence of hemodynamically significant stenosis.    Antegrade right vertebral flow.    Left internal carotid artery demonstrates normal flow without evidence of hemodynamically significant stenosis.    Antegrade left vertebral flow.      Results for orders placed during the hospital encounter of 01/13/25    Bilateral Carotid Duplex    Interpretation Summary    Right internal carotid artery demonstrates normal flow without evidence of hemodynamically significant stenosis.    Antegrade right vertebral flow.    Left internal carotid artery demonstrates normal flow without evidence of hemodynamically significant stenosis.     Antegrade left vertebral flow.      Results for orders placed in visit on 01/14/25    Intra-Op Anesthesia BENJAMIN    Narrative  Intra-Op Anesthesia BENJAMIN    Procedure Performed: Intra-Op Anesthesia BENJAMIN  Start Time:  End Time:    Preanesthesia Checklist:  Patient identified, IV assessed, risks and benefits discussed, monitors and equipment assessed, procedure being performed at surgeon's request and anesthesia consent obtained.    General Procedure Information  BENJAMIN Placed for monitoring purposes only -- This is not a diagnostic BENJAMIN  Diagnostic Indications for Echo:  assessment of surgical repair and hemodynamic monitoring  Physician Requesting Echo: Zac Mcbride MD  Location performed:  OR  Intubated  Bite block placed  Heart visualized  Probe Insertion:  Easy  Probe Type:  Multiplane  Modalities:  Color flow mapping and continuous wave Doppler    Echocardiographic and Doppler Measurements    Ventricles    Right Ventricle:  Cavity size normal.  Thrombus not present.  Global function mildly impaired.  Left Ventricle:  Cavity size normal.  Thrombus not present.  Global Function mildly impaired.  Ejection Fraction 50%.        Valves    Aortic Valve:  Annulus normal.  Stenosis not present.  Area: 1.1 cm².  Mean Gradient: 42 mmHg.  Regurgitation absent.  Leaflets normal.  Leaflet motions normal.    Mitral Valve:  Annulus normal.  Stenosis not present.  Regurgitation trace.  Leaflets normal.  Leaflet motions normal.    Tricuspid Valve:  Annulus normal.  Stenosis not present.  Regurgitation absent.  Leaflets normal.  Leaflet motions normal.  Pulmonic Valve:  Annulus normal.  Stenosis not present.  Regurgitation absent.      Aorta    Ascending Aorta:  Size normal.  Dissection not present.  Aortic Arch:  Size normal.  Dissection not present.  Descending Aorta:  Size normal.  Dissection not present.      Atria    Right Atrium:  Size normal.  Spontaneous echo contrast not present.  Thrombus not present.  Tumor not present.   Device not present.    Left Atrium:  Size normal.  Spontaneous echo contrast not present.  Thrombus not present.  Tumor not present.  Device not present.  Left atrial appendage normal.      Septa        Ventricular Septum:  Intra-ventricular septum morphology normal.        Other Findings  Pericardium:  normal  Pleural Effusion:  none      Anesthesia Information  Performed Personally  Anesthesiologist:  Yogesh Doan MD      Echocardiogram Comments:  Post bypass EF 50%. No perivalvular leak noted. Mean gradient 9.42. Trace MR. No AI.      Labs Pending at Discharge:      Procedures Performed           Consults:   Consults       Date and Time Order Name Status Description    2/4/2025  4:54 AM Hospitalist (on-call MD unless specified)                Discharge Details        Discharge Medications        New Medications        Instructions Start Date   benzonatate 200 MG capsule  Commonly known as: TESSALON   200 mg, Oral, 3 Times Daily PRN      guaiFENesin 600 MG 12 hr tablet  Commonly known as: MUCINEX   600 mg, Oral, Every 12 Hours Scheduled      ondansetron ODT 4 MG disintegrating tablet  Commonly known as: ZOFRAN-ODT   4 mg, Oral, Every 6 Hours PRN      oseltamivir 75 MG capsule  Commonly known as: TAMIFLU   75 mg, Oral, Every 12 Hours Scheduled             Continue These Medications        Instructions Start Date   acetaminophen 325 MG tablet  Commonly known as: TYLENOL   650 mg, Oral, Every 6 Hours PRN      aspirin 81 MG EC tablet   81 mg, Oral, Daily      melatonin 5 MG tablet tablet   Nightly PRN      metoprolol tartrate 25 MG tablet  Commonly known as: LOPRESSOR   12.5 mg, Oral, Every 12 Hours Scheduled      traMADol 50 MG tablet  Commonly known as: ULTRAM   50 mg, Oral, Every 8 Hours PRN               Allergies   Allergen Reactions    Sulfa Antibiotics GI Intolerance    Penicillins Rash         Discharge Disposition:  Home or Self Care    Diet:  Hospital:  Diet Order   Procedures    Diet: Cardiac;  Healthy Heart (2-3 Na+); Fluid Consistency: Thin (IDDSI 0)         Discharge Activity:         CODE STATUS:  Code Status and Medical Interventions: CPR (Attempt to Resuscitate); Full Support   Ordered at: 02/04/25 0532     Level Of Support Discussed With:    Patient     Code Status (Patient has no pulse and is not breathing):    CPR (Attempt to Resuscitate)     Medical Interventions (Patient has pulse or is breathing):    Full Support         Future Appointments   Date Time Provider Department Center   2/11/2025 11:30 AM Slime John APRN [facility]   2/13/2025  1:45 PM Thalia Mora APRN [facility]           Time spent on Discharge including face to face service:  40 minutes    Signature:    Electronically signed by Luz Maria Blum DO, 02/05/25, 10:51 AM EST.      Part of this note may be an electronic transcription/translation of spoken language to printed text using the Dragon Dictation System.

## 2025-02-06 ENCOUNTER — APPOINTMENT (OUTPATIENT)
Dept: GENERAL RADIOLOGY | Facility: HOSPITAL | Age: 70
End: 2025-02-06
Payer: MEDICARE

## 2025-02-06 ENCOUNTER — READMISSION MANAGEMENT (OUTPATIENT)
Dept: CALL CENTER | Facility: HOSPITAL | Age: 70
End: 2025-02-06
Payer: MEDICARE

## 2025-02-06 ENCOUNTER — TELEPHONE (OUTPATIENT)
Dept: CARDIOLOGY | Facility: CLINIC | Age: 70
End: 2025-02-06

## 2025-02-06 ENCOUNTER — HOSPITAL ENCOUNTER (OUTPATIENT)
Facility: HOSPITAL | Age: 70
Setting detail: OBSERVATION
Discharge: HOME | End: 2025-02-07
Attending: EMERGENCY MEDICINE | Admitting: EMERGENCY MEDICINE
Payer: MEDICARE

## 2025-02-06 DIAGNOSIS — I48.91 UNSPECIFIED ATRIAL FIBRILLATION: ICD-10-CM

## 2025-02-06 DIAGNOSIS — I48.91 ATRIAL FIBRILLATION WITH RAPID VENTRICULAR RESPONSE: Primary | ICD-10-CM

## 2025-02-06 DIAGNOSIS — E86.0 DEHYDRATION: ICD-10-CM

## 2025-02-06 DIAGNOSIS — E87.6 HYPOKALEMIA: ICD-10-CM

## 2025-02-06 LAB
ALBUMIN SERPL-MCNC: 3.9 G/DL (ref 3.5–5.2)
ALBUMIN/GLOB SERPL: 1.6 G/DL
ALP SERPL-CCNC: 87 U/L (ref 39–117)
ALT SERPL W P-5'-P-CCNC: 14 U/L (ref 1–33)
ANION GAP SERPL CALCULATED.3IONS-SCNC: 13.6 MMOL/L (ref 5–15)
APTT PPP: 26.5 SECONDS (ref 22.7–35.4)
AST SERPL-CCNC: 31 U/L (ref 1–32)
BASOPHILS # BLD AUTO: 0.02 10*3/MM3 (ref 0–0.2)
BASOPHILS NFR BLD AUTO: 0.4 % (ref 0–1.5)
BILIRUB SERPL-MCNC: 0.2 MG/DL (ref 0–1.2)
BUN SERPL-MCNC: 17 MG/DL (ref 8–23)
BUN/CREAT SERPL: 26.2 (ref 7–25)
CALCIUM SPEC-SCNC: 9.6 MG/DL (ref 8.6–10.5)
CHLORIDE SERPL-SCNC: 106 MMOL/L (ref 98–107)
CO2 SERPL-SCNC: 22.4 MMOL/L (ref 22–29)
CREAT SERPL-MCNC: 0.65 MG/DL (ref 0.57–1)
DEPRECATED RDW RBC AUTO: 42.6 FL (ref 37–54)
EGFRCR SERPLBLD CKD-EPI 2021: 96 ML/MIN/1.73
EOSINOPHIL # BLD AUTO: 0 10*3/MM3 (ref 0–0.4)
EOSINOPHIL NFR BLD AUTO: 0 % (ref 0.3–6.2)
ERYTHROCYTE [DISTWIDTH] IN BLOOD BY AUTOMATED COUNT: 13.9 % (ref 12.3–15.4)
GEN 5 1HR TROPONIN T REFLEX: 79 NG/L
GLOBULIN UR ELPH-MCNC: 2.5 GM/DL
GLUCOSE SERPL-MCNC: 95 MG/DL (ref 65–99)
HCT VFR BLD AUTO: 39.1 % (ref 34–46.6)
HGB BLD-MCNC: 12 G/DL (ref 12–15.9)
HOLD SPECIMEN: NORMAL
IMM GRANULOCYTES # BLD AUTO: 0.02 10*3/MM3 (ref 0–0.05)
IMM GRANULOCYTES NFR BLD AUTO: 0.4 % (ref 0–0.5)
INR PPP: 1.1 (ref 0.9–1.1)
LYMPHOCYTES # BLD AUTO: 1.83 10*3/MM3 (ref 0.7–3.1)
LYMPHOCYTES NFR BLD AUTO: 35 % (ref 19.6–45.3)
MAGNESIUM SERPL-MCNC: 1.8 MG/DL (ref 1.6–2.4)
MCH RBC QN AUTO: 26.1 PG (ref 26.6–33)
MCHC RBC AUTO-ENTMCNC: 30.7 G/DL (ref 31.5–35.7)
MCV RBC AUTO: 85 FL (ref 79–97)
MONOCYTES # BLD AUTO: 0.69 10*3/MM3 (ref 0.1–0.9)
MONOCYTES NFR BLD AUTO: 13.2 % (ref 5–12)
NEUTROPHILS NFR BLD AUTO: 2.67 10*3/MM3 (ref 1.7–7)
NEUTROPHILS NFR BLD AUTO: 51 % (ref 42.7–76)
NRBC BLD AUTO-RTO: 0 /100 WBC (ref 0–0.2)
NT-PROBNP SERPL-MCNC: 590 PG/ML (ref 0–900)
PLATELET # BLD AUTO: 286 10*3/MM3 (ref 140–450)
PMV BLD AUTO: 9.9 FL (ref 6–12)
POTASSIUM SERPL-SCNC: 3.2 MMOL/L (ref 3.5–5.2)
PROT SERPL-MCNC: 6.4 G/DL (ref 6–8.5)
PROTHROMBIN TIME: 14.2 SECONDS (ref 11.7–14.2)
RBC # BLD AUTO: 4.6 10*6/MM3 (ref 3.77–5.28)
SODIUM SERPL-SCNC: 142 MMOL/L (ref 136–145)
TROPONIN T % DELTA: 16
TROPONIN T NUMERIC DELTA: 11 NG/L
TROPONIN T SERPL HS-MCNC: 68 NG/L
TSH SERPL DL<=0.05 MIU/L-ACNC: 1.13 UIU/ML (ref 0.27–4.2)
WBC NRBC COR # BLD AUTO: 5.23 10*3/MM3 (ref 3.4–10.8)
WHOLE BLOOD HOLD SPECIMEN: NORMAL

## 2025-02-06 PROCEDURE — 36415 COLL VENOUS BLD VENIPUNCTURE: CPT | Performed by: EMERGENCY MEDICINE

## 2025-02-06 PROCEDURE — 85025 COMPLETE CBC W/AUTO DIFF WBC: CPT | Performed by: EMERGENCY MEDICINE

## 2025-02-06 PROCEDURE — 96365 THER/PROPH/DIAG IV INF INIT: CPT

## 2025-02-06 PROCEDURE — 84443 ASSAY THYROID STIM HORMONE: CPT | Performed by: EMERGENCY MEDICINE

## 2025-02-06 PROCEDURE — G0378 HOSPITAL OBSERVATION PER HR: HCPCS

## 2025-02-06 PROCEDURE — 80053 COMPREHEN METABOLIC PANEL: CPT | Performed by: EMERGENCY MEDICINE

## 2025-02-06 PROCEDURE — 85610 PROTHROMBIN TIME: CPT | Performed by: EMERGENCY MEDICINE

## 2025-02-06 PROCEDURE — 84484 ASSAY OF TROPONIN QUANT: CPT | Performed by: EMERGENCY MEDICINE

## 2025-02-06 PROCEDURE — 93005 ELECTROCARDIOGRAM TRACING: CPT | Performed by: EMERGENCY MEDICINE

## 2025-02-06 PROCEDURE — 83735 ASSAY OF MAGNESIUM: CPT | Performed by: EMERGENCY MEDICINE

## 2025-02-06 PROCEDURE — 99285 EMERGENCY DEPT VISIT HI MDM: CPT

## 2025-02-06 PROCEDURE — 25810000003 SODIUM CHLORIDE 0.9 % SOLUTION: Performed by: EMERGENCY MEDICINE

## 2025-02-06 PROCEDURE — 83880 ASSAY OF NATRIURETIC PEPTIDE: CPT | Performed by: EMERGENCY MEDICINE

## 2025-02-06 PROCEDURE — 85730 THROMBOPLASTIN TIME PARTIAL: CPT | Performed by: EMERGENCY MEDICINE

## 2025-02-06 PROCEDURE — 71045 X-RAY EXAM CHEST 1 VIEW: CPT

## 2025-02-06 RX ORDER — ONDANSETRON 2 MG/ML
4 INJECTION INTRAMUSCULAR; INTRAVENOUS EVERY 6 HOURS PRN
Status: DISCONTINUED | OUTPATIENT
Start: 2025-02-06 | End: 2025-02-07 | Stop reason: HOSPADM

## 2025-02-06 RX ORDER — DILTIAZEM HCL IN NACL,ISO-OSM 125 MG/125
5-15 PLASTIC BAG, INJECTION (ML) INTRAVENOUS CONTINUOUS
Status: DISCONTINUED | OUTPATIENT
Start: 2025-02-06 | End: 2025-02-07 | Stop reason: HOSPADM

## 2025-02-06 RX ORDER — MORPHINE SULFATE 2 MG/ML
1 INJECTION, SOLUTION INTRAMUSCULAR; INTRAVENOUS EVERY 4 HOURS PRN
Status: DISCONTINUED | OUTPATIENT
Start: 2025-02-06 | End: 2025-02-07 | Stop reason: HOSPADM

## 2025-02-06 RX ORDER — DILTIAZEM HYDROCHLORIDE 5 MG/ML
10 INJECTION INTRAVENOUS ONCE
Status: DISCONTINUED | OUTPATIENT
Start: 2025-02-06 | End: 2025-02-06

## 2025-02-06 RX ORDER — BISACODYL 10 MG
10 SUPPOSITORY, RECTAL RECTAL DAILY PRN
Status: DISCONTINUED | OUTPATIENT
Start: 2025-02-06 | End: 2025-02-07 | Stop reason: HOSPADM

## 2025-02-06 RX ORDER — BISACODYL 5 MG/1
5 TABLET, DELAYED RELEASE ORAL DAILY PRN
Status: DISCONTINUED | OUTPATIENT
Start: 2025-02-06 | End: 2025-02-07 | Stop reason: HOSPADM

## 2025-02-06 RX ORDER — METOPROLOL TARTRATE 25 MG/1
25 TABLET, FILM COATED ORAL ONCE
Status: COMPLETED | OUTPATIENT
Start: 2025-02-06 | End: 2025-02-06

## 2025-02-06 RX ORDER — SODIUM CHLORIDE 0.9 % (FLUSH) 0.9 %
10 SYRINGE (ML) INJECTION AS NEEDED
Status: DISCONTINUED | OUTPATIENT
Start: 2025-02-06 | End: 2025-02-07 | Stop reason: HOSPADM

## 2025-02-06 RX ORDER — POTASSIUM CHLORIDE 1500 MG/1
40 TABLET, EXTENDED RELEASE ORAL EVERY 4 HOURS
Status: COMPLETED | OUTPATIENT
Start: 2025-02-06 | End: 2025-02-07

## 2025-02-06 RX ORDER — AMOXICILLIN 250 MG
2 CAPSULE ORAL 2 TIMES DAILY
Status: DISCONTINUED | OUTPATIENT
Start: 2025-02-06 | End: 2025-02-07 | Stop reason: HOSPADM

## 2025-02-06 RX ORDER — DILTIAZEM HYDROCHLORIDE 5 MG/ML
INJECTION INTRAVENOUS
Status: ACTIVE
Start: 2025-02-06 | End: 2025-02-07

## 2025-02-06 RX ORDER — DILTIAZEM HCL/D5W 125 MG/125
5-15 PLASTIC BAG, INJECTION (ML) INTRAVENOUS CONTINUOUS
Status: DISCONTINUED | OUTPATIENT
Start: 2025-02-06 | End: 2025-02-06

## 2025-02-06 RX ORDER — NALOXONE HCL 0.4 MG/ML
0.4 VIAL (ML) INJECTION
Status: DISCONTINUED | OUTPATIENT
Start: 2025-02-06 | End: 2025-02-07 | Stop reason: HOSPADM

## 2025-02-06 RX ORDER — POLYETHYLENE GLYCOL 3350 17 G/17G
17 POWDER, FOR SOLUTION ORAL DAILY PRN
Status: DISCONTINUED | OUTPATIENT
Start: 2025-02-06 | End: 2025-02-07 | Stop reason: HOSPADM

## 2025-02-06 RX ORDER — NITROGLYCERIN 0.4 MG/1
0.4 TABLET SUBLINGUAL
Status: DISCONTINUED | OUTPATIENT
Start: 2025-02-06 | End: 2025-02-07 | Stop reason: HOSPADM

## 2025-02-06 RX ADMIN — POTASSIUM CHLORIDE 40 MEQ: 1500 TABLET, EXTENDED RELEASE ORAL at 22:27

## 2025-02-06 RX ADMIN — Medication 5 MG: at 22:27

## 2025-02-06 RX ADMIN — DILTIAZEM HYDROCHLORIDE 5 MG/HR: 5 INJECTION INTRAVENOUS at 18:35

## 2025-02-06 RX ADMIN — METOPROLOL TARTRATE 25 MG: 25 TABLET, FILM COATED ORAL at 22:27

## 2025-02-06 RX ADMIN — SODIUM CHLORIDE 1000 ML: 9 INJECTION, SOLUTION INTRAVENOUS at 17:56

## 2025-02-06 NOTE — CASE MANAGEMENT/SOCIAL WORK
Case Management Discharge Note      Final Note: Routine home    Provided Post Acute Provider List?: N/A  Provided Post Acute Provider Quality & Resource List?: N/A    Selected Continued Care - Discharged on 2/5/2025 Admission date: 2/4/2025 - Discharge disposition: Home or Self Care      Destination    No services have been selected for the patient.                Durable Medical Equipment Coordination complete.      Service Provider Services Address Phone Fax Patient Preferred    [facility] [employer] [address] [address] [phone] --                               Transportation Services  Private: Car    Final Discharge Disposition Code: 01 - home or self-care

## 2025-02-06 NOTE — OUTREACH NOTE
Prep Survey      Flowsheet Row Responses   [facility] facility patient discharged from? [facility]   Is LACE score < 7 ? No   Eligibility Readm Mgmt   Discharge diagnosis Acute respiratory failure   Does the patient have one of the following disease processes/diagnoses(primary or secondary)? Other   Does the patient have Home health ordered? No   Is there a DME ordered? Yes   What DME was ordered? [place] for DME   Prep survey completed? Yes            RAPHAEL CRESPO - Registered Nurse

## 2025-02-06 NOTE — ED PROVIDER NOTES
Subjective   History of Present Illness  63-year-old female states she felt like her heart rates been bouncing around today with periods of heart racing.  She reports no chest pain or dyspnea.  She reports no abdominal pain or vomiting states she has had a few loose stools.  She has had some decreased oral intake over the last several days due to influenza.  He is feeling better from the influenza and reports no cough or dyspnea.  Review of Systems    Past Medical History:   Diagnosis Date    Hypertension        Allergies   Allergen Reactions    Sulfa Antibiotics GI Intolerance    Penicillins Rash       Past Surgical History:   Procedure Laterality Date    AORTIC VALVE REPAIR/REPLACEMENT N/A 1/14/2025    Procedure: AORTIC VALVE REPLACEMENT with 23mm Avalus Valve;  Surgeon: Zac Mcbride MD;  Location: [facility];  Service: Cardiothoracic;  Laterality: N/A;    CARDIAC CATHETERIZATION Right 12/30/2024    Procedure: Right and Left Heart Cath;  Surgeon: Makayla Driscoll MD;  Location: [facility] CATH INVASIVE LOCATION;  Service: Cardiovascular;  Laterality: Right;  Local and IV sedation    TRANSESOPHAGEAL ECHOCARDIOGRAM (BENJAMIN) N/A 1/14/2025    Procedure: TRANSESOPHAGEAL ECHOCARDIOGRAM WITH ANESTHESIA;  Surgeon: Zac Mcbride MD;  Location: [facility];  Service: Cardiothoracic;  Laterality: N/A;       No family history on file.    Social History     Socioeconomic History    Marital status:    Tobacco Use    Smoking status: Never     Passive exposure: Never    Smokeless tobacco: Never   Vaping Use    Vaping status: Never Used   Substance and Sexual Activity    Alcohol use: Never    Drug use: Never    Sexual activity: Defer       Prior to Admission medications    Medication Sig Start Date End Date Taking? Authorizing Provider   acetaminophen (TYLENOL) 325 MG tablet Take 2 tablets by mouth Every 6 (Six) Hours As Needed for Mild Pain or Moderate Pain. 1/18/25   Jay Jay Cleaning MD   aspirin 81 MG EC  "tablet Take 1 tablet by mouth Daily for 90 days. 1/18/25 4/18/25  Jay Jay Cleaning MD   benzonatate (TESSALON) 200 MG capsule Take 1 capsule by mouth 3 (Three) Times a Day As Needed for Cough for up to 3 days. 2/4/25 2/7/25  Altagracia Hill APRN   guaiFENesin (MUCINEX) 600 MG 12 hr tablet Take 1 tablet by mouth Every 12 (Twelve) Hours. 2/5/25   Luz Maria Blum DO   melatonin 5 MG tablet tablet Take  by mouth At Night As Needed.    Provider, MD Ian   metoprolol tartrate (LOPRESSOR) 25 MG tablet Take 0.5 tablets by mouth Every 12 (Twelve) Hours. 1/18/25   Jay Jay Cleaning MD   ondansetron ODT (ZOFRAN-ODT) 4 MG disintegrating tablet Take 1 tablet by mouth Every 6 (Six) Hours As Needed for Nausea or Vomiting. 2/5/25   Luz Maria Blum DO   oseltamivir (TAMIFLU) 75 MG capsule Take 1 capsule by mouth Every 12 (Twelve) Hours for 7 doses. Indications: Influenza A Virus Infection 2/5/25 2/9/25  Luz Maria Blum DO   traMADol (ULTRAM) 50 MG tablet Take 1 tablet by mouth Every 8 (Eight) Hours As Needed for Moderate Pain. 1/20/25   Thalia Mora APRN     /81   Pulse 94   Temp 98.2 °F (36.8 °C) (Oral)   Resp 15   Ht 144.8 cm (57\")   Wt 64 kg (141 lb)   SpO2 96%   BMI 30.51 kg/m²       Objective   Physical Exam  General: Well-developed well-appearing, no acute distress, alert and appropriate  Eyes:  sclera nonicteric  HEENT: Mucous membranes dry, no mucosal swelling  Neck: Supple, no nuchal rigidity, no JVD  Respirations: Respirations nonlabored, equal breath sounds bilaterally, clear lungs  Heart increased rate, regular rhythm, no murmurs rubs or gallops,   Abdomen soft nontender nondistended, no hepatosplenomegaly, no hernia, no mass, normal bowel sounds, no CVA tenderness  Extremities no clubbing cyanosis or edema, calves are symmetric and nontender  Neuro cranial nerves grossly intact, no focal limb deficits  Psych oriented, pleasant affect  Skin no rash, brisk cap " refill  Procedures           ED Course        Results for orders placed or performed during the hospital encounter of 02/06/25   ECG 12 Lead Rhythm Change    Collection Time: 02/06/25  5:44 PM   Result Value Ref Range    QT Interval 298 ms    QTC Interval 489 ms   Gold Top - SST    Collection Time: 02/06/25  5:52 PM   Result Value Ref Range    Extra Tube Hold for add-ons.    ECG 12 Lead Rhythm Change    Collection Time: 02/06/25  6:43 PM   Result Value Ref Range    QT Interval 363 ms    QTC Interval 451 ms   CBC Auto Differential    Collection Time: 02/06/25  6:49 PM    Specimen: Blood   Result Value Ref Range    WBC 5.23 3.40 - 10.80 10*3/mm3    RBC 4.60 3.77 - 5.28 10*6/mm3    Hemoglobin 12.0 12.0 - 15.9 g/dL    Hematocrit 39.1 34.0 - 46.6 %    MCV 85.0 79.0 - 97.0 fL    MCH 26.1 (L) 26.6 - 33.0 pg    MCHC 30.7 (L) 31.5 - 35.7 g/dL    RDW 13.9 12.3 - 15.4 %    RDW-SD 42.6 37.0 - 54.0 fl    MPV 9.9 6.0 - 12.0 fL    Platelets 286 140 - 450 10*3/mm3    Neutrophil % 51.0 42.7 - 76.0 %    Lymphocyte % 35.0 19.6 - 45.3 %    Monocyte % 13.2 (H) 5.0 - 12.0 %    Eosinophil % 0.0 (L) 0.3 - 6.2 %    Basophil % 0.4 0.0 - 1.5 %    Immature Grans % 0.4 0.0 - 0.5 %    Neutrophils, Absolute 2.67 1.70 - 7.00 10*3/mm3    Lymphocytes, Absolute 1.83 0.70 - 3.10 10*3/mm3    Monocytes, Absolute 0.69 0.10 - 0.90 10*3/mm3    Eosinophils, Absolute 0.00 0.00 - 0.40 10*3/mm3    Basophils, Absolute 0.02 0.00 - 0.20 10*3/mm3    Immature Grans, Absolute 0.02 0.00 - 0.05 10*3/mm3    nRBC 0.0 0.0 - 0.2 /100 WBC   Comprehensive Metabolic Panel    Collection Time: 02/06/25  6:53 PM    Specimen: Blood   Result Value Ref Range    Glucose 95 65 - 99 mg/dL    BUN 17 8 - 23 mg/dL    Creatinine 0.65 0.57 - 1.00 mg/dL    Sodium 142 136 - 145 mmol/L    Potassium 3.2 (L) 3.5 - 5.2 mmol/L    Chloride 106 98 - 107 mmol/L    CO2 22.4 22.0 - 29.0 mmol/L    Calcium 9.6 8.6 - 10.5 mg/dL    Total Protein 6.4 6.0 - 8.5 g/dL    Albumin 3.9 3.5 - 5.2 g/dL    ALT  (SGPT) 14 1 - 33 U/L    AST (SGOT) 31 1 - 32 U/L    Alkaline Phosphatase 87 39 - 117 U/L    Total Bilirubin 0.2 0.0 - 1.2 mg/dL    Globulin 2.5 gm/dL    A/G Ratio 1.6 g/dL    BUN/Creatinine Ratio 26.2 (H) 7.0 - 25.0    Anion Gap 13.6 5.0 - 15.0 mmol/L    eGFR 96.0 >60.0 mL/min/1.73   Magnesium    Collection Time: 02/06/25  6:53 PM    Specimen: Blood   Result Value Ref Range    Magnesium 1.8 1.6 - 2.4 mg/dL   Protime-INR    Collection Time: 02/06/25  6:53 PM    Specimen: Blood   Result Value Ref Range    Protime 14.2 11.7 - 14.2 Seconds    INR 1.10 0.90 - 1.10   aPTT    Collection Time: 02/06/25  6:53 PM    Specimen: Blood   Result Value Ref Range    PTT 26.5 22.7 - 35.4 seconds   High Sensitivity Troponin T    Collection Time: 02/06/25  6:53 PM    Specimen: Blood   Result Value Ref Range    HS Troponin T 68 (C) <14 ng/L   BNP    Collection Time: 02/06/25  6:53 PM    Specimen: Blood   Result Value Ref Range    proBNP 590.0 0.0 - 900.0 pg/mL   TSH Rfx On Abnormal To Free T4    Collection Time: 02/06/25  6:53 PM    Specimen: Blood   Result Value Ref Range    TSH 1.130 0.270 - 4.200 uIU/mL   Lavender Top    Collection Time: 02/06/25  6:53 PM   Result Value Ref Range    Extra Tube hold for add-on      XR Chest 1 View    Result Date: 2/6/2025  Impression: No acute cardiopulmonary abnormality. Electronically Signed: Latoya Delgado MD  2/6/2025 6:25 PM EST  Workstation ID: [id number]                                                  Medical Decision Making  Differential diagnosis including atrial fibrillation with RVR, supraventricular tachycardia, electrolyte disturbance, dehydration, acute coronary syndrome, pulmonary embolus    Patient is having no dyspnea or hypoxia to suggest pulmonary embolus.  She was ordered Cardizem bolus and converted to sinus rhythm with the first 10 mg bolus.  She was found to be hypokalemic and ordered potassium replacement protocol.  She is agreeable to plan of hospital observation with cardiology  consultation for further monitoring and management.    Problems Addressed:  Atrial fibrillation with rapid ventricular response: complicated acute illness or injury  Dehydration: complicated acute illness or injury  Hypokalemia: complicated acute illness or injury    Amount and/or Complexity of Data Reviewed  Labs: ordered. Decision-making details documented in ED Course.  Radiology: ordered and independent interpretation performed.     Details: My independent interpretation of chest x-ray image no pneumonia or congestive failure  ECG/medicine tests: ordered and independent interpretation performed.     Details: My initial EKG interpretation is a tachycardia rate of 162 likely A-fib, left bundle branch block which is chronic when compared to recent EKG.  Repeat EKG following treatment shows a conversion to sinus rhythm with a chronic left bundle branch block, rate of 93    Risk  OTC drugs.  Prescription drug management.  Decision regarding hospitalization.    High-sensitivity troponin marginally elevated likely related to recent surgery, patient is not having any active chest pain to suggest ischemia.  CBC no leukocytosis, comprehensive metabolic panel shows a low potassium, BNP normal, TSH normal magnesium normal    Final diagnoses:   Atrial fibrillation with rapid ventricular response   Hypokalemia   Dehydration       ED Disposition  ED Disposition       ED Disposition   Decision to Admit    Condition   --    Comment   --               No follow-up provider specified.       Medication List      No changes were made to your prescriptions during this visit.            George Fajardo MD  02/06/25 1953

## 2025-02-06 NOTE — CASE MANAGEMENT/SOCIAL WORK
Case Management Readmission Assessment Note    Case Management Readmission Assessment (all recorded)       Readmission Interview       Row Name 02/06/25 0829             Readmission Indications    Is the patient and/or family able to complete the readmission assessment questions? No  Discharged prior to re admission assessment. Done Via Chart review.      Is this hospitalization related to the prior hospital diagnosis? No        Row Name 02/06/25 0829             Recommendation for rehospitalization    Did you speak with your physician prior to coming to the hospital No        Row Name 02/06/25 0829             Follow-up Appointments    Do you have a PCP? Yes      Did you have an appointment with PCP after your hospitalization? No      Did you have an appointment with a Specialist? Yes      When was your appointment scheduled? 01/15/25  Dr. Thrasher      Did you go to appointment? Yes      Are you current with the Pulmonary Clinic? No      Are you current with the CHF Clinic? No        Row Name 02/04/25 1151             Advance Directives (For Healthcare)    Pre-existing AND/MOST/POLST Order No      Advance Directive Status Patient does not have advance directive      Have you reviewed your Advance Directive and is it valid for this stay? No      Literature Provided on Advance Directives No      Patient Requests Assistance on Advance Directives Patient Declined        Row Name 02/06/25 0829             Readmission Assessment Final Comments    Final Comments PREVIOUS: 1/13 ED with Shortness of breath, Echo, AVR on 1/14/25.  Dc'd on 1/18/25 Routine home with .      CURRENT: 2/4 ED with respiratory failure and hypoxia. Positive for Flu A. 2/5- Dc'd routine home.

## 2025-02-06 NOTE — TELEPHONE ENCOUNTER
Pt spouse called into the office asking about pt heart rate. He stated the pt heart rate was sitting at 160 for the last 3 hours and she was not feeling well. Pt spouse was advised to take pt to the ED. He verbalized understanding and will call with nay other concerns.

## 2025-02-07 ENCOUNTER — TELEPHONE (OUTPATIENT)
Dept: CARDIAC REHAB | Facility: HOSPITAL | Age: 70
End: 2025-02-07
Payer: MEDICARE

## 2025-02-07 ENCOUNTER — APPOINTMENT (OUTPATIENT)
Dept: RESPIRATORY THERAPY | Facility: HOSPITAL | Age: 70
End: 2025-02-07
Payer: MEDICARE

## 2025-02-07 VITALS
HEART RATE: 75 BPM | WEIGHT: 141.09 LBS | SYSTOLIC BLOOD PRESSURE: 138 MMHG | BODY MASS INDEX: 30.44 KG/M2 | HEIGHT: 57 IN | RESPIRATION RATE: 20 BRPM | OXYGEN SATURATION: 96 % | DIASTOLIC BLOOD PRESSURE: 83 MMHG | TEMPERATURE: 98.5 F

## 2025-02-07 LAB
ANION GAP SERPL CALCULATED.3IONS-SCNC: 11.8 MMOL/L (ref 5–15)
BASOPHILS # BLD AUTO: 0.02 10*3/MM3 (ref 0–0.2)
BASOPHILS NFR BLD AUTO: 0.4 % (ref 0–1.5)
BUN SERPL-MCNC: 15 MG/DL (ref 8–23)
BUN/CREAT SERPL: 25 (ref 7–25)
CALCIUM SPEC-SCNC: 9.3 MG/DL (ref 8.6–10.5)
CHLORIDE SERPL-SCNC: 106 MMOL/L (ref 98–107)
CO2 SERPL-SCNC: 23.2 MMOL/L (ref 22–29)
CREAT SERPL-MCNC: 0.6 MG/DL (ref 0.57–1)
DEPRECATED RDW RBC AUTO: 41.5 FL (ref 37–54)
EGFRCR SERPLBLD CKD-EPI 2021: 97.9 ML/MIN/1.73
EOSINOPHIL # BLD AUTO: 0.04 10*3/MM3 (ref 0–0.4)
EOSINOPHIL NFR BLD AUTO: 0.7 % (ref 0.3–6.2)
ERYTHROCYTE [DISTWIDTH] IN BLOOD BY AUTOMATED COUNT: 13.7 % (ref 12.3–15.4)
GLUCOSE SERPL-MCNC: 108 MG/DL (ref 65–99)
HCT VFR BLD AUTO: 35.7 % (ref 34–46.6)
HGB BLD-MCNC: 11.3 G/DL (ref 12–15.9)
IMM GRANULOCYTES # BLD AUTO: 0.02 10*3/MM3 (ref 0–0.05)
IMM GRANULOCYTES NFR BLD AUTO: 0.4 % (ref 0–0.5)
LYMPHOCYTES # BLD AUTO: 2.49 10*3/MM3 (ref 0.7–3.1)
LYMPHOCYTES NFR BLD AUTO: 46.4 % (ref 19.6–45.3)
MCH RBC QN AUTO: 26.2 PG (ref 26.6–33)
MCHC RBC AUTO-ENTMCNC: 31.7 G/DL (ref 31.5–35.7)
MCV RBC AUTO: 82.6 FL (ref 79–97)
MONOCYTES # BLD AUTO: 0.64 10*3/MM3 (ref 0.1–0.9)
MONOCYTES NFR BLD AUTO: 11.9 % (ref 5–12)
NEUTROPHILS NFR BLD AUTO: 2.16 10*3/MM3 (ref 1.7–7)
NEUTROPHILS NFR BLD AUTO: 40.2 % (ref 42.7–76)
NRBC BLD AUTO-RTO: 0 /100 WBC (ref 0–0.2)
PLATELET # BLD AUTO: 296 10*3/MM3 (ref 140–450)
PMV BLD AUTO: 10.5 FL (ref 6–12)
POTASSIUM SERPL-SCNC: 3.7 MMOL/L (ref 3.5–5.2)
QT INTERVAL: 298 MS
QT INTERVAL: 363 MS
QTC INTERVAL: 451 MS
QTC INTERVAL: 489 MS
RBC # BLD AUTO: 4.32 10*6/MM3 (ref 3.77–5.28)
SODIUM SERPL-SCNC: 141 MMOL/L (ref 136–145)
WBC NRBC COR # BLD AUTO: 5.37 10*3/MM3 (ref 3.4–10.8)

## 2025-02-07 PROCEDURE — 80048 BASIC METABOLIC PNL TOTAL CA: CPT | Performed by: EMERGENCY MEDICINE

## 2025-02-07 PROCEDURE — 85025 COMPLETE CBC W/AUTO DIFF WBC: CPT | Performed by: EMERGENCY MEDICINE

## 2025-02-07 PROCEDURE — G0378 HOSPITAL OBSERVATION PER HR: HCPCS

## 2025-02-07 PROCEDURE — 25010000002 MAGNESIUM SULFATE IN D5W 1G/100ML (PREMIX) 1-5 GM/100ML-% SOLUTION: Performed by: NURSE PRACTITIONER

## 2025-02-07 PROCEDURE — 99214 OFFICE O/P EST MOD 30 MIN: CPT | Performed by: STUDENT IN AN ORGANIZED HEALTH CARE EDUCATION/TRAINING PROGRAM

## 2025-02-07 RX ORDER — METOPROLOL SUCCINATE 25 MG/1
25 TABLET, EXTENDED RELEASE ORAL 2 TIMES DAILY
Qty: 60 TABLET | Refills: 1 | Status: SHIPPED | OUTPATIENT
Start: 2025-02-07

## 2025-02-07 RX ORDER — ASPIRIN 81 MG/1
81 TABLET ORAL DAILY
Status: DISCONTINUED | OUTPATIENT
Start: 2025-02-07 | End: 2025-02-07 | Stop reason: HOSPADM

## 2025-02-07 RX ORDER — MAGNESIUM SULFATE 1 G/100ML
1 INJECTION INTRAVENOUS ONCE
Status: COMPLETED | OUTPATIENT
Start: 2025-02-07 | End: 2025-02-07

## 2025-02-07 RX ADMIN — Medication 10 ML: at 09:48

## 2025-02-07 RX ADMIN — MAGNESIUM SULFATE IN DEXTROSE 1 G: 10 INJECTION, SOLUTION INTRAVENOUS at 14:42

## 2025-02-07 RX ADMIN — Medication 12.5 MG: at 09:47

## 2025-02-07 RX ADMIN — ASPIRIN 81 MG: 81 TABLET, COATED ORAL at 09:47

## 2025-02-07 RX ADMIN — POTASSIUM CHLORIDE 40 MEQ: 1500 TABLET, EXTENDED RELEASE ORAL at 03:04

## 2025-02-07 NOTE — CONSULTS
Cardiology attending:  Seen and examined.  Chart and labs reviewed. Independent interpretations of cardiac testing was performed. History and exam findings are verified.  Assessment and plan notated by APC after being formulated by attending consultant.  Note that greater than 50% of the time spent in care of the patient was provided by attending consultant.    Patient is a 69-year-old female with history of severe AS status post AVR with bioprosthetic valve and left atrial appendage closure in 2025.  She was admitted with significant palpitations.  She recently had influenza infection and is recovering over the past days.  Her EKG showed wide-complex tachycardia.  Patient has underlying left bundle branch block.  On telemetry her tachycardia is most consistent with atrial fibrillation with rapid ventricular response.  She progressed with improvement of her heart rate in conversion to normal sinus rhythm.    Assessment/plan  Atrial fibrillation with rapid regular response, paroxysmal, new onset.  History of left atrial appendage closure in 2025  History of severe AS status post bioprosthetic AVR    Recommendations  Increase metoprolol to 50 mg twice daily  Continue aspirin.  Patient had a left atrial appendage closure in January.  As outpatient we will pursue BENJAMIN to ensure left atrial appendage is properly closed.  Replete electrolytes as needed.    Electronically signed by Keven Jasso MD, 25, 3:24 PM EST.    Cardiology Consult Note      REQUESTING PHYSICIAN    George Fajardo MD    PATIENT IDENTIFICATION  Name: Racahel Rodriguez  Age: 69 y.o.  Sex: female influenza A  :  1955  MRN: [id number]             REASON FOR CONSULTATION:  69-year-old female with known history of severe aortic valve stenosis status post aortic valve replacement with [employer] pericardial prosthesis 27 mm and left atrial appendage closure with atrial clip on 2025.  Heart cath  prior to surgery with no evidence of occlusive coronary disease.  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  History of hypertension, dyslipidemia, moderate to severe pulmonary hypertension, mild mitral valve insufficiency.    CC:  Palpitations    HISTORY OF PRESENT ILLNESS:   Patient was diagnosed with influenza A with estimated symptom onset 2/2/25.  She reports that she took a couple doses of Tamiflu and had severe side effects so she discontinued the medication.  She presented to the emergency department 2/6/25 with complaint of palpitations.  She reports awakening yesterday morning with a sensation of her heart beating very hard and fast.  She denies any manny chest pain, pressure or tightness, no shortness of breath.  She reported that her legs felt very weak yesterday upon awakening.  She denies any falls, near syncopal or syncopal episodes.  EKG performed showed regular, wide-complex tachycardia in the setting of left bundle branch block which is chronic.  She was given 10 mg bolus IV diltiazem, 25 mg p.o. Lopressor.  Her heart rhythm converted to sinus approximately 6:30 yesterday evening.  Upon my evaluation today, she is sitting up in bed and reports that the palpitations have subsided and not returned.  The patient tells me that she is a  and does not take any type of immunizations.  She denies any lower extremity edema, abdominal distention.      REVIEW OF SYSTEMS:  Pertinent items are noted in HPI, all other systems reviewed and negative    OBJECTIVE   High-sensitivity troponin 68, 79  Potassium 3.2-replaced to 3.7  Magnesium 1.8  Chest x-ray with no acute findings    ASSESSMENT  Narrow complex tachycardia-now normal sinus rhythm  Severe nonrheumatic aortic valve stenosis/mild to moderate nonrheumatic mitral valve insufficiency  Nonischemic cardiomyopathy with EF 45-50% (IntraOp BENJAMIN)  Status post elective tissue AVR with left atrial appendage -atrial  "clip  Primary hypertension with grade 2 diastolic dysfunction  Dyslipidemia-statin intolerance      PLAN  Telemetry and EKG reviewed-rhythm either rapid A-fib or flutter.  Underlying bundle branch block gives an appearance of VT.  Patient had recent bioprosthetic aortic valve replacement with uneventful hospital course and no postop arrhythmia documented.  Mild drop in potassium-replaced  Will give 1 g magnesium sulfate to increase serum magnesium to at least 2  Continue aspirin, BB  Further recommendations following evaluation by cardiologist      CHF Guideline Directed Medical Therapy  Beta Blocker:   ARNI/ACE/ARB:   SGLT 2 inhibitors:   Diuretics:   Aldosterone Antagonist:   Vasodilators & Nitrates:     Vital Signs  Visit Vitals  /74 (BP Location: Left arm, Patient Position: Lying)   Pulse 78   Temp 98.3 °F (36.8 °C) (Oral)   Resp 19   Ht 144.8 cm (57.01\")   Wt 64 kg (141 lb 1.5 oz)   SpO2 97%   BMI 30.52 kg/m²     Oxygen Therapy  SpO2: 97 %  Device (Oxygen Therapy): room air  Flowsheet Rows      Flowsheet Row First Filed Value   Admission Height 144.8 cm (57\") Documented at 02/06/2025 1734   Admission Weight 64 kg (141 lb) Documented at 02/06/2025 1734          Intake & Output (last 3 days)       None          Lines, Drains & Airways       Active LDAs       Name Placement date Placement time Site Days    Peripheral IV 02/06/25 1752 Left;Posterior Wrist 02/06/25  1752  Wrist  less than 1                    MEDICAL HISTORY    Past Medical History:   Diagnosis Date    Hypertension         SURGICAL HISTORY    Past Surgical History:   Procedure Laterality Date    AORTIC VALVE REPAIR/REPLACEMENT N/A 1/14/2025    Procedure: AORTIC VALVE REPLACEMENT with 23mm Avalus Valve;  Surgeon: Zac Mcbride MD;  Location: [facility];  Service: Cardiothoracic;  Laterality: N/A;    CARDIAC CATHETERIZATION Right 12/30/2024    Procedure: Right and Left Heart Cath;  Surgeon: Makayla Driscoll MD;  Location: [facility] CATH " "INVASIVE LOCATION;  Service: Cardiovascular;  Laterality: Right;  Local and IV sedation    TRANSESOPHAGEAL ECHOCARDIOGRAM (BENJAMIN) N/A 1/14/2025    Procedure: TRANSESOPHAGEAL ECHOCARDIOGRAM WITH ANESTHESIA;  Surgeon: Zac Mcbride MD;  Location: [facility];  Service: Cardiothoracic;  Laterality: N/A;        FAMILY HISTORY    Family History   Problem Relation Age of Onset    Heart attack Father     Atrial fibrillation Father        SOCIAL HISTORY    Social History     Tobacco Use    Smoking status: Never     Passive exposure: Never    Smokeless tobacco: Never   Substance Use Topics    Alcohol use: Never        ALLERGIES    Allergies   Allergen Reactions    Sulfa Antibiotics GI Intolerance    Penicillins Rash              /74 (BP Location: Left arm, Patient Position: Lying)   Pulse 78   Temp 98.3 °F (36.8 °C) (Oral)   Resp 19   Ht 144.8 cm (57.01\")   Wt 64 kg (141 lb 1.5 oz)   SpO2 97%   BMI 30.52 kg/m²   Intake/Output last 3 shifts:  No intake/output data recorded.  Intake/Output this shift:  No intake/output data recorded.    PHYSICAL EXAM:    General: Alert, cooperative, no distress, appears stated age  Head:  Normocephalic, atraumatic, mucous membranes moist  Eyes:  Conjunctivae/corneas clear, EOM's intact     Neck:  Supple,  no adenopathy; no JVD or bruit  Lungs: Clear to auscultation bilaterally, no wheezes, rhonchi or rales are noted  Chest wall: Sternal wound well-approximated and well-healed  Heart::  Regular rate and rhythm, S1 and S2 normal, no murmur, rub or gallop  Abdomen: Soft, nontender, nondistended, bowel sounds active  Extremities: No cyanosis, clubbing, or edema   Pulses: 2+ and symmetric all extremities  Skin:  No rashes or lesions  Neuro/psych: A&O x3. CN II through XII are grossly intact with appropriate affect      Scheduled Meds:      aspirin, 81 mg, Oral, Daily  metoprolol tartrate, 12.5 mg, Oral, Q12H  senna-docusate sodium, 2 tablet, Oral, BID        Continuous " "Infusions:    dilTIAZem, 5-15 mg/hr, Last Rate: Stopped (02/06/25 1949)        PRN Meds:      senna-docusate sodium **AND** polyethylene glycol **AND** bisacodyl **AND** bisacodyl    melatonin    Morphine **AND** naloxone    nitroglycerin    ondansetron    Potassium Replacement - Follow Nurse / BPA Driven Protocol    [COMPLETED] Insert Peripheral IV **AND** sodium chloride        Results Review:     I reviewed the patient's new clinical results.    CBC    Results from last 7 days   Lab Units 02/07/25 0318 02/06/25  1849 02/05/25 0348 02/04/25 0228   WBC 10*3/mm3 5.37 5.23 4.08 6.82   HEMOGLOBIN g/dL 11.3* 12.0 10.8* 11.6*   PLATELETS 10*3/mm3 296 286 292 383     Cr Clearance Estimated Creatinine Clearance: 74.9 mL/min (by C-G formula based on SCr of 0.6 mg/dL).  Coag   Results from last 7 days   Lab Units 02/06/25  1853   INR  1.10   APTT seconds 26.5     HbA1C   Lab Results   Component Value Date    HGBA1C 5.49 01/13/2025    HGBA1C 5.41 12/28/2024     Blood Glucose No results found for: \"POCGLU\"  Infection   Results from last 7 days   Lab Units 02/05/25  0348   PROCALCITONIN ng/mL 0.05     CMP   Results from last 7 days   Lab Units 02/07/25 0318 02/06/25  1853 02/05/25  0348 02/04/25  0228   SODIUM mmol/L 141 142 139 136   POTASSIUM mmol/L 3.7 3.2* 3.6 3.9   CHLORIDE mmol/L 106 106 104 101   CO2 mmol/L 23.2 22.4 22.8 19.9*   BUN mg/dL 15 17 10 9   CREATININE mg/dL 0.60 0.65 0.69 0.67   GLUCOSE mg/dL 108* 95 84 123*   ALBUMIN g/dL  --  3.9  --  4.3   BILIRUBIN mg/dL  --  0.2  --  0.3   ALK PHOS U/L  --  87  --  104   AST (SGOT) U/L  --  31  --  26   ALT (SGPT) U/L  --  14  --  12     ABG      UA      TERRENCE  No results found for: \"POCMETH\", \"POCAMPHET\", \"POCBARBITUR\", \"POCBENZO\", \"POCCOCAINE\", \"POCOPIATES\", \"POCOXYCODO\", \"POCPHENCYC\", \"POCPROPOXY\", \"POCTHC\", \"POCTRICYC\"  Lysis Labs   Results from last 7 days   Lab Units 02/07/25  0318 02/06/25  1853 02/06/25  1849 02/05/25  0348 02/04/25  0228   INR   --  1.10  --  "  --   --    APTT seconds  --  26.5  --   --   --    HEMOGLOBIN g/dL 11.3*  --  12.0 10.8* 11.6*   PLATELETS 10*3/mm3 296  --  286 292 383   CREATININE mg/dL 0.60 0.65  --  0.69 0.67     Radiology(recent) XR Chest 1 View    Result Date: 2/6/2025  Impression: No acute cardiopulmonary abnormality. Electronically Signed: Latoya Delgado MD  2/6/2025 6:25 PM EST  Workstation ID: [id number]       Results from last 7 days   Lab Units 02/06/25 2020   HSTROP T ng/L 79*       X-rays, labs reviewed personally by physician.    ECG/EMG Results (most recent)       Procedure Component Value Units Date/Time    ECG 12 Lead Rhythm Change [[id number]] Collected: 02/06/25 1843     Updated: 02/07/25 0632     QT Interval 363 ms      QTC Interval 451 ms     Narrative:      HEART RATE=93  bpm  RR Yjraocjc=165  ms  RI Xdlgqcvn=608  ms  P Horizontal Axis=-13  deg  P Front Axis=71  deg  QRSD Cirxjsuu=376  ms  QT Tinmdttf=733  ms  SMlW=112  ms  QRS Axis=48  deg  T Wave Axis=235  deg  - ABNORMAL ECG -  Sinus rhythm  Left atrial enlargement  Left bundle branch block  ST elevation secondary to IVCD  When compared with ECG of 06-Feb-2025 17:44:01,  Significant change in rhythm  Significant repolarization change  Significant axis, voltage or hypertrophy change  Electronically Signed By: George Fajardo (Jos) 2025-02-07 06:32:45  Date and Time of Study:2025-02-06 18:43:33    ECG 12 Lead Rhythm Change [[id number]] Collected: 02/06/25 1744     Updated: 02/07/25 0633     QT Interval 298 ms      QTC Interval 489 ms     Narrative:      HEART YRBV=652  bpm  RR Tgwksflx=044  ms  RI Interval=54  ms  P Horizontal Axis=  deg  P Front Axis=Invalid  deg  QRSD Kbcvyicy=313  ms  QT Yibwcntr=275  ms  KMpA=139  ms  QRS Axis=27  deg  T Wave Axis=201  deg  - ABNORMAL ECG -  Wide-QRS tachycardia  Left bundle branch block  When compared with ECG of 04-Feb-2025 02:41:55,  Significant change in rhythm: previously sinus  Significant repolarization change  Electronically Signed  "By: George Fajardo ([facility]) 2025-02-07 06:33:12  Date and Time of Study:2025-02-06 17:44:01              Medication Review:   I have reviewed the patient's current medication list  Scheduled Meds:aspirin, 81 mg, Oral, Daily  metoprolol tartrate, 12.5 mg, Oral, Q12H  senna-docusate sodium, 2 tablet, Oral, BID      Continuous Infusions:dilTIAZem, 5-15 mg/hr, Last Rate: Stopped (02/06/25 1949)      PRN Meds:.  senna-docusate sodium **AND** polyethylene glycol **AND** bisacodyl **AND** bisacodyl    melatonin    Morphine **AND** naloxone    nitroglycerin    ondansetron    Potassium Replacement - Follow Nurse / BPA Driven Protocol    [COMPLETED] Insert Peripheral IV **AND** sodium chloride    Imaging:  Imaging Results (Last 72 Hours)       Procedure Component Value Units Date/Time    XR Chest 1 View [[id number]] Collected: 02/06/25 1824     Updated: 02/06/25 1827    Narrative:      XR CHEST 1 VW    Date of Exam: 2/6/2025 6:22 PM EST    Indication: soa    Comparison: Chest x-ray 2/4/2025    Findings:  Cardiomegaly. Postoperative changes of the heart. No pneumothorax or pleural effusion. No focal parenchymal opacity. Pulmonary vessels are distinct. Scoliosis thoracic spine.      Impression:      Impression:  No acute cardiopulmonary abnormality.      Electronically Signed: Latoya Delgado MD    2/6/2025 6:25 PM EST    Workstation ID: [id number]              ALEX Castaneda  02/07/25  07:51 EST       EMR Dragon/Transcription:   \"Dictated utilizing Dragon dictation\".                 Electronically signed by ALEX Castaneda, 02/07/25, 7:51 AM EST.  Copied text in this note has been reviewed by me and is accurate as of 02/07/25.    "

## 2025-02-07 NOTE — DISCHARGE SUMMARY
[facility]    Alvaro Cardona MD    CHIEF COMPLAINT:     Heart racing    HISTORY OF PRESENT ILLNESS:    [facility]    ED  63-year-old female states she felt like her heart rates been bouncing around today with periods of heart racing. She reports no chest pain or dyspnea. She reports no abdominal pain or vomiting states she has had a few loose stools. She has had some decreased oral intake over the last several days due to influenza. He is feeling better from the influenza and reports no cough or dyspnea.     Past Medical History:   Diagnosis Date    Hypertension      Past Surgical History:   Procedure Laterality Date    AORTIC VALVE REPAIR/REPLACEMENT N/A 1/14/2025    Procedure: AORTIC VALVE REPLACEMENT with 23mm Avalus Valve;  Surgeon: Zac Mcbride MD;  Location: [facility];  Service: Cardiothoracic;  Laterality: N/A;    CARDIAC CATHETERIZATION Right 12/30/2024    Procedure: Right and Left Heart Cath;  Surgeon: Makayla Driscoll MD;  Location: [facility] CATH INVASIVE LOCATION;  Service: Cardiovascular;  Laterality: Right;  Local and IV sedation    TRANSESOPHAGEAL ECHOCARDIOGRAM (BENJAMIN) N/A 1/14/2025    Procedure: TRANSESOPHAGEAL ECHOCARDIOGRAM WITH ANESTHESIA;  Surgeon: Zac Mcbride MD;  Location: [facility];  Service: Cardiothoracic;  Laterality: N/A;     Family History   Problem Relation Age of Onset    Heart attack Father     Atrial fibrillation Father      Social History     Tobacco Use    Smoking status: Never     Passive exposure: Never    Smokeless tobacco: Never   Vaping Use    Vaping status: Never Used   Substance Use Topics    Alcohol use: Never    Drug use: Never     Medications Prior to Admission   Medication Sig Dispense Refill Last Dose/Taking    aspirin 81 MG EC tablet Take 1 tablet by mouth Daily for 90 days. 30 tablet 2 2/6/2025    melatonin 5 MG tablet tablet Take  by mouth At Night As Needed.   2/5/2025    metoprolol tartrate (LOPRESSOR) 25 MG tablet Take  0.5 tablets by mouth Every 12 (Twelve) Hours. 90 tablet 0 2/6/2025    acetaminophen (TYLENOL) 325 MG tablet Take 2 tablets by mouth Every 6 (Six) Hours As Needed for Mild Pain or Moderate Pain. 30 tablet 0      Allergies:  Statins, Sulfa antibiotics, and Penicillins      There is no immunization history on file for this patient.        REVIEW OF SYSTEMS:    Review of Systems   Cardiovascular:  Positive for palpitations.           Vital Signs  Temp:  [98.2 °F (36.8 °C)-98.7 °F (37.1 °C)] 98.5 °F (36.9 °C)  Heart Rate:  [] 75  Resp:  [15-22] 20  BP: (105-152)/(65-95) 138/83          Physical Exam:  Physical Exam  Constitutional:       Appearance: Normal appearance.   Cardiovascular:      Rate and Rhythm: Rhythm irregular.   Pulmonary:      Effort: Pulmonary effort is normal.      Breath sounds: Normal breath sounds.   Skin:     General: Skin is warm and dry.   Neurological:      General: No focal deficit present.      Mental Status: She is alert and oriented to person, place, and time.   Psychiatric:         Mood and Affect: Mood normal.         Behavior: Behavior normal.             Emotional Behavior:    wnl   Debilities:   None      Results Review:    I reviewed the patient's new clinical results.  Lab Results (most recent)       Procedure Component Value Units Date/Time    Basic Metabolic Panel [[id number]]  (Abnormal) Collected: 02/07/25 0318    Specimen: Blood Updated: 02/07/25 0533     Glucose 108 mg/dL      BUN 15 mg/dL      Creatinine 0.60 mg/dL      Sodium 141 mmol/L      Potassium 3.7 mmol/L      Chloride 106 mmol/L      CO2 23.2 mmol/L      Calcium 9.3 mg/dL      BUN/Creatinine Ratio 25.0     Anion Gap 11.8 mmol/L      eGFR 97.9 mL/min/1.73     Narrative:      GFR Categories in Chronic Kidney Disease (CKD)      GFR Category          GFR (mL/min/1.73)    Interpretation  G1                     90 or greater         Normal or high (1)  G2                      60-89                Mild decrease (1)  G3a                    45-59                Mild to moderate decrease  G3b                   30-44                Moderate to severe decrease  G4                    15-29                Severe decrease  G5                    14 or less           Kidney failure          (1)In the absence of evidence of kidney disease, neither GFR category G1 or G2 fulfill the criteria for CKD.    eGFR calculation 2021 CKD-EPI creatinine equation, which does not include race as a factor    CBC & Differential [[id number]]  (Abnormal) Collected: 02/07/25 0318    Specimen: Blood Updated: 02/07/25 0459    Narrative:      The following orders were created for panel order CBC & Differential.  Procedure                               Abnormality         Status                     ---------                               -----------         ------                     CBC Auto Differential[[id number]]        Abnormal            Final result                 Please view results for these tests on the individual orders.    CBC Auto Differential [[id number]]  (Abnormal) Collected: 02/07/25 0318    Specimen: Blood Updated: 02/07/25 0459     WBC 5.37 10*3/mm3      RBC 4.32 10*6/mm3      Hemoglobin 11.3 g/dL      Hematocrit 35.7 %      MCV 82.6 fL      MCH 26.2 pg      MCHC 31.7 g/dL      RDW 13.7 %      RDW-SD 41.5 fl      MPV 10.5 fL      Platelets 296 10*3/mm3      Neutrophil % 40.2 %      Lymphocyte % 46.4 %      Monocyte % 11.9 %      Eosinophil % 0.7 %      Basophil % 0.4 %      Immature Grans % 0.4 %      Neutrophils, Absolute 2.16 10*3/mm3      Lymphocytes, Absolute 2.49 10*3/mm3      Monocytes, Absolute 0.64 10*3/mm3      Eosinophils, Absolute 0.04 10*3/mm3      Basophils, Absolute 0.02 10*3/mm3      Immature Grans, Absolute 0.02 10*3/mm3      nRBC 0.0 /100 WBC     High Sensitivity Troponin T 1Hr [[id number]]  (Abnormal) Collected: 02/06/25 2020    Specimen: Blood Updated: 02/06/25 2058     HS Troponin T 79 ng/L      Troponin T Numeric Delta 11 ng/L       Troponin T % Delta 16    Narrative:      High Sensitive Troponin T Reference Range:  <14.0 ng/L- Negative Female for AMI  <22.0 ng/L- Negative Male for AMI  >=14 - Abnormal Female indicating possible myocardial injury.  >=22 - Abnormal Male indicating possible myocardial injury.   Clinicians would have to utilize clinical acumen, EKG, Troponin, and serial changes to determine if it is an Acute Myocardial Infarction or myocardial injury due to an underlying chronic condition.         Comprehensive Metabolic Panel [[id number]]  (Abnormal) Collected: 02/06/25 1853    Specimen: Blood Updated: 02/06/25 1933     Glucose 95 mg/dL      BUN 17 mg/dL      Creatinine 0.65 mg/dL      Sodium 142 mmol/L      Potassium 3.2 mmol/L      Chloride 106 mmol/L      CO2 22.4 mmol/L      Calcium 9.6 mg/dL      Total Protein 6.4 g/dL      Albumin 3.9 g/dL      ALT (SGPT) 14 U/L      AST (SGOT) 31 U/L      Alkaline Phosphatase 87 U/L      Total Bilirubin 0.2 mg/dL      Globulin 2.5 gm/dL      A/G Ratio 1.6 g/dL      BUN/Creatinine Ratio 26.2     Anion Gap 13.6 mmol/L      eGFR 96.0 mL/min/1.73     Narrative:      GFR Categories in Chronic Kidney Disease (CKD)      GFR Category          GFR (mL/min/1.73)    Interpretation  G1                     90 or greater         Normal or high (1)  G2                      60-89                Mild decrease (1)  G3a                   45-59                Mild to moderate decrease  G3b                   30-44                Moderate to severe decrease  G4                    15-29                Severe decrease  G5                    14 or less           Kidney failure          (1)In the absence of evidence of kidney disease, neither GFR category G1 or G2 fulfill the criteria for CKD.    eGFR calculation 2021 CKD-EPI creatinine equation, which does not include race as a factor    Magnesium [[id number]]  (Normal) Collected: 02/06/25 1853    Specimen: Blood Updated: 02/06/25 1933     Magnesium 1.8 mg/dL      High Sensitivity Troponin T [[id number]]  (Abnormal) Collected: 02/06/25 1853    Specimen: Blood Updated: 02/06/25 1933     HS Troponin T 68 ng/L     Narrative:      High Sensitive Troponin T Reference Range:  <14.0 ng/L- Negative Female for AMI  <22.0 ng/L- Negative Male for AMI  >=14 - Abnormal Female indicating possible myocardial injury.  >=22 - Abnormal Male indicating possible myocardial injury.   Clinicians would have to utilize clinical acumen, EKG, Troponin, and serial changes to determine if it is an Acute Myocardial Infarction or myocardial injury due to an underlying chronic condition.         BNP [[id number]]  (Normal) Collected: 02/06/25 1853    Specimen: Blood Updated: 02/06/25 1933     proBNP 590.0 pg/mL     Narrative:      This assay is used as an aid in the diagnosis of individuals suspected of having heart failure. It can be used as an aid in the diagnosis of acute decompensated heart failure (ADHF) in patients presenting with signs and symptoms of ADHF to the emergency department (ED). In addition, NT-proBNP of <300 pg/mL indicates ADHF is not likely.    Age Range Result Interpretation  NT-proBNP Concentration (pg/mL:      <50             Positive            >450                   Gray                 300-450                    Negative             <300    50-75           Positive            >900                  Gray                300-900                  Negative            <300      >75             Positive            >1800                  Gray                300-1800                  Negative            <300    TSH Rfx On Abnormal To Free T4 [[id number]]  (Normal) Collected: 02/06/25 1853    Specimen: Blood Updated: 02/06/25 1933     TSH 1.130 uIU/mL     Protime-INR [[id number]]  (Normal) Collected: 02/06/25 1853    Specimen: Blood Updated: 02/06/25 1910     Protime 14.2 Seconds      INR 1.10    aPTT [[id number]]  (Normal) Collected: 02/06/25 1853    Specimen: Blood Updated: 02/06/25 1910      PTT 26.5 seconds     Extra Tubes [[id number]] Collected: 02/06/25 1853    Specimen: Blood, Venous Line Updated: 02/06/25 1900    Narrative:      The following orders were created for panel order Extra Tubes.  Procedure                               Abnormality         Status                     ---------                               -----------         ------                     Lavender Top[[id number]]                                     Final result                 Please view results for these tests on the individual orders.    Lavender Top [[id number]] Collected: 02/06/25 1853    Specimen: Blood Updated: 02/06/25 1900     Extra Tube hold for add-on     Comment: Auto resulted       CBC & Differential [[id number]]  (Abnormal) Collected: 02/06/25 1849    Specimen: Blood Updated: 02/06/25 1852    Narrative:      The following orders were created for panel order CBC & Differential.  Procedure                               Abnormality         Status                     ---------                               -----------         ------                     CBC Auto Differential[[id number]]        Abnormal            Final result                 Please view results for these tests on the individual orders.    CBC Auto Differential [[id number]]  (Abnormal) Collected: 02/06/25 1849    Specimen: Blood Updated: 02/06/25 1852     WBC 5.23 10*3/mm3      RBC 4.60 10*6/mm3      Hemoglobin 12.0 g/dL      Hematocrit 39.1 %      MCV 85.0 fL      MCH 26.1 pg      MCHC 30.7 g/dL      RDW 13.9 %      RDW-SD 42.6 fl      MPV 9.9 fL      Platelets 286 10*3/mm3      Neutrophil % 51.0 %      Lymphocyte % 35.0 %      Monocyte % 13.2 %      Eosinophil % 0.0 %      Basophil % 0.4 %      Immature Grans % 0.4 %      Neutrophils, Absolute 2.67 10*3/mm3      Lymphocytes, Absolute 1.83 10*3/mm3      Monocytes, Absolute 0.69 10*3/mm3      Eosinophils, Absolute 0.00 10*3/mm3      Basophils, Absolute 0.02 10*3/mm3      Immature Grans, Absolute 0.02  10*3/mm3      nRBC 0.0 /100 WBC     Extra Tubes [[id number]] Collected: 02/06/25 1752    Specimen: Blood, Venous Line Updated: 02/06/25 1800    Narrative:      The following orders were created for panel order Extra Tubes.  Procedure                               Abnormality         Status                     ---------                               -----------         ------                     Gold Top - [facility][[id number]]                                   Final result                 Please view results for these tests on the individual orders.    Gold Top - [facility] [[id number]] Collected: 02/06/25 1752    Specimen: Blood Updated: 02/06/25 1800     Extra Tube Hold for add-ons.     Comment: Auto resulted.               Imaging Results (Most Recent)       Procedure Component Value Units Date/Time    XR Chest 1 View [[id number]] Collected: 02/06/25 1824     Updated: 02/06/25 1827    Narrative:      XR CHEST 1 VW    Date of Exam: 2/6/2025 6:22 PM EST    Indication: soa    Comparison: Chest x-ray 2/4/2025    Findings:  Cardiomegaly. Postoperative changes of the heart. No pneumothorax or pleural effusion. No focal parenchymal opacity. Pulmonary vessels are distinct. Scoliosis thoracic spine.      Impression:      Impression:  No acute cardiopulmonary abnormality.      Electronically Signed: Latoya Delgado MD    2/6/2025 6:25 PM EST    Workstation ID: [id number]          reviewed    ECG/EMG Results (most recent)       Procedure Component Value Units Date/Time    ECG 12 Lead Rhythm Change [[id number]] Collected: 02/06/25 1843     Updated: 02/07/25 0632     QT Interval 363 ms      QTC Interval 451 ms     Narrative:      HEART RATE=93  bpm  RR Tlsrabdt=535  ms  CO Mfcgfrnz=937  ms  P Horizontal Axis=-13  deg  P Front Axis=71  deg  QRSD Reltuccm=790  ms  QT Tefqwybi=884  ms  NWyV=528  ms  QRS Axis=48  deg  T Wave Axis=235  deg  - ABNORMAL ECG -  Sinus rhythm  Left atrial enlargement  Left bundle branch block  ST elevation secondary to  IVCD  When compared with ECG of 06-Feb-2025 17:44:01,  Significant change in rhythm  Significant repolarization change  Significant axis, voltage or hypertrophy change  Electronically Signed By: George Fajardo ([facility]) 2025-02-07 06:32:45  Date and Time of Study:2025-02-06 18:43:33    ECG 12 Lead Rhythm Change [[id number]] Collected: 02/06/25 1744     Updated: 02/07/25 0633     QT Interval 298 ms      QTC Interval 489 ms     Narrative:      HEART CLMM=684  bpm  RR Dxmngvpz=909  ms  ME Interval=54  ms  P Horizontal Axis=  deg  P Front Axis=Invalid  deg  QRSD Hceftjsn=213  ms  QT Zlbygwsu=252  ms  OQdI=170  ms  QRS Axis=27  deg  T Wave Axis=201  deg  - ABNORMAL ECG -  Wide-QRS tachycardia  Left bundle branch block  When compared with ECG of 04-Feb-2025 02:41:55,  Significant change in rhythm: previously sinus  Significant repolarization change  Electronically Signed By: George Fajardo ([facility]) 2025-02-07 06:33:12  Date and Time of Study:2025-02-06 17:44:01    Telemetry Scan [[id number]] Resulted: 02/06/25     Updated: 02/07/25 0857    Telemetry Scan [[id number]] Resulted: 02/06/25     Updated: 02/07/25 0914    Telemetry Scan [[id number]] Resulted: 02/06/25     Updated: 02/07/25 0934    Telemetry Scan [[id number]] Resulted: 02/06/25     Updated: 02/07/25 1149    Telemetry Scan [[id number]] Resulted: 02/06/25     Updated: 02/07/25 1502          reviewed    Results for orders placed during the hospital encounter of 01/13/25    Bilateral Carotid Duplex    Interpretation Summary    Right internal carotid artery demonstrates normal flow without evidence of hemodynamically significant stenosis.    Antegrade right vertebral flow.    Left internal carotid artery demonstrates normal flow without evidence of hemodynamically significant stenosis.    Antegrade left vertebral flow.      Results for orders placed in visit on 01/14/25    Intra-Op Anesthesia BENJAMIN    Narrative  Intra-Op Anesthesia BENJAMIN    Procedure Performed: Intra-Op Anesthesia  BENJAMIN  Start Time:  End Time:    Preanesthesia Checklist:  Patient identified, IV assessed, risks and benefits discussed, monitors and equipment assessed, procedure being performed at surgeon's request and anesthesia consent obtained.    General Procedure Information  BENJAMIN Placed for monitoring purposes only -- This is not a diagnostic BENJAMIN  Diagnostic Indications for Echo:  assessment of surgical repair and hemodynamic monitoring  Physician Requesting Echo: Zac Mcbride MD  Location performed:  OR  Intubated  Bite block placed  Heart visualized  Probe Insertion:  Easy  Probe Type:  Multiplane  Modalities:  Color flow mapping and continuous wave Doppler    Echocardiographic and Doppler Measurements    Ventricles    Right Ventricle:  Cavity size normal.  Thrombus not present.  Global function mildly impaired.  Left Ventricle:  Cavity size normal.  Thrombus not present.  Global Function mildly impaired.  Ejection Fraction 50%.        Valves    Aortic Valve:  Annulus normal.  Stenosis not present.  Area: 1.1 cm².  Mean Gradient: 42 mmHg.  Regurgitation absent.  Leaflets normal.  Leaflet motions normal.    Mitral Valve:  Annulus normal.  Stenosis not present.  Regurgitation trace.  Leaflets normal.  Leaflet motions normal.    Tricuspid Valve:  Annulus normal.  Stenosis not present.  Regurgitation absent.  Leaflets normal.  Leaflet motions normal.  Pulmonic Valve:  Annulus normal.  Stenosis not present.  Regurgitation absent.      Aorta    Ascending Aorta:  Size normal.  Dissection not present.  Aortic Arch:  Size normal.  Dissection not present.  Descending Aorta:  Size normal.  Dissection not present.      Atria    Right Atrium:  Size normal.  Spontaneous echo contrast not present.  Thrombus not present.  Tumor not present.  Device not present.    Left Atrium:  Size normal.  Spontaneous echo contrast not present.  Thrombus not present.  Tumor not present.  Device not present.  Left atrial appendage  normal.      Septa        Ventricular Septum:  Intra-ventricular septum morphology normal.        Other Findings  Pericardium:  normal  Pleural Effusion:  none      Anesthesia Information  Performed Personally  Anesthesiologist:  Yogesh Doan MD      Echocardiogram Comments:  Post bypass EF 50%. No perivalvular leak noted. Mean gradient 9.42. Trace MR. No AI.      Microbiology Results (last 10 days)       Procedure Component Value - Date/Time    Respiratory Panel PCR w/COVID-19(SARS-CoV-2) PHI/ANAYA/[facility]I/PAD/COR/TATE In-House, NP Swab in UTM/VTM, 2 HR TAT - Swab, Nasopharynx [[id number]]  (Abnormal) Collected: 02/04/25 0229    Lab Status: Final result Specimen: Swab from Nasopharynx Updated: 02/04/25 0327     ADENOVIRUS, PCR Not Detected     Coronavirus 229E Not Detected     Coronavirus HKU1 Not Detected     Coronavirus NL63 Not Detected     Coronavirus OC43 Not Detected     COVID19 Not Detected     Human Metapneumovirus Not Detected     Human Rhinovirus/Enterovirus Not Detected     Influenza A H1 2009 PCR Detected     Influenza B PCR Not Detected     Parainfluenza Virus 1 Not Detected     Parainfluenza Virus 2 Not Detected     Parainfluenza Virus 3 Not Detected     Parainfluenza Virus 4 Not Detected     RSV, PCR Not Detected     Bordetella pertussis pcr Not Detected     Bordetella parapertussis PCR Not Detected     Chlamydophila pneumoniae PCR Not Detected     Mycoplasma pneumo by PCR Not Detected    Narrative:      In the setting of a positive respiratory panel with a viral infection PLUS a negative procalcitonin without other underlying concern for bacterial infection, consider observing off antibiotics or discontinuation of antibiotics and continue supportive care. If the respiratory panel is positive for atypical bacterial infection (Bordetella pertussis, Chlamydophila pneumoniae, or Mycoplasma pneumoniae), consider antibiotic de-escalation to target atypical bacterial infection.            Assessment &  Plan     Atrial fibrillation with rapid ventricular response     Atrial fibrillation  - cbc, bnp, inr, tsh, cmp unremarkable  - troponin 68, 79  - chest xray showing no acute cardiopulmonary process  - EKG rate 93 LBBB  - cardiology consulted  - mcot for discharge and beta blocker increased  - follow up as outpt    I discussed the patients findings and my recommendations with patient and nursing staff.     Discharge Diagnosis:      Atrial fibrillation with rapid ventricular response      Hospital Course  Patient is a 69 y.o. female presented with heart racing. Er evaluated and admitted to observation. Labs including cbc, bnp, inr, tsh, cmp are normal. Troponin stable. Chest xray is normal. Cardiology consulted and recommends mcot and beta blocker increase. Discharge discussed with pt and she is agreeable to plan. Instructed pt to return to er if symptoms reoccur or worsen.      Past Medical History:     Past Medical History:   Diagnosis Date    Hypertension        Past Surgical History:     Past Surgical History:   Procedure Laterality Date    AORTIC VALVE REPAIR/REPLACEMENT N/A 1/14/2025    Procedure: AORTIC VALVE REPLACEMENT with 23mm Avalus Valve;  Surgeon: Zac Mcbride MD;  [facility]  Service: Cardiothoracic;  Laterality: N/A;    CARDIAC CATHETERIZATION Right 12/30/2024    Procedure: Right and Left Heart Cath;  Surgeon: Makayla Driscoll MD;  Location: [facility] CATH INVASIVE LOCATION;  Service: Cardiovascular;  Laterality: Right;  Local and IV sedation    TRANSESOPHAGEAL ECHOCARDIOGRAM (BENJAMIN) N/A 1/14/2025    Procedure: TRANSESOPHAGEAL ECHOCARDIOGRAM WITH ANESTHESIA;  Surgeon: Zac Mcbride MD;  [facility];  Service: Cardiothoracic;  Laterality: N/A;       Social History:   Social History     Socioeconomic History    Marital status:    Tobacco Use    Smoking status: Never     Passive exposure: Never    Smokeless tobacco: Never   Vaping Use    Vaping status: Never Used    Substance and Sexual Activity    Alcohol use: Never    Drug use: Never    Sexual activity: Defer       Procedures Performed         Consults:   Consults       Date and Time Order Name Status Description    2/6/2025  8:54 PM Inpatient Cardiology Consult Completed             Condition on Discharge:     Stable    Discharge Disposition  Home or Self Care    Discharge Medications     Discharge Medications        New Medications        Instructions Start Date   metoprolol succinate XL 25 MG 24 hr tablet  Commonly known as: Toprol XL   25 mg, Oral, 2 Times Daily             Continue These Medications        Instructions Start Date   acetaminophen 325 MG tablet  Commonly known as: TYLENOL   650 mg, Oral, Every 6 Hours PRN      aspirin 81 MG EC tablet   81 mg, Oral, Daily      melatonin 5 MG tablet tablet   Nightly PRN             Stop These Medications      metoprolol tartrate 25 MG tablet  Commonly known as: LOPRESSOR              Discharge Diet:     Activity at Discharge:     Follow-up Appointments  Future Appointments   Date Time Provider Department Center   2/11/2025 11:30 AM Slime John APRN [facility]   2/13/2025  1:45 PM Thalia Mora APRN [facility]     Additional Instructions for the Follow-ups that You Need to Schedule       Discharge Follow-up with Specified Provider: cardiology   As directed      To: cardiology                Test Results Pending at Discharge  Pending Results       Procedure [Order ID] Specimen - Date/Time    Cardiac Event Monitor (CALLIE) or Mobile Cardiac Outpatient Telemetry (MCT) [[id number]]              Risk for Readmission (LACE) Score: 5 (2/7/2025  6:00 AM)      Less Than 30 minutes spent in discharge activities for this patient    Signature:Electronically signed by Aissatou Young PA-C, 02/07/25, 5:01 PM EST.

## 2025-02-07 NOTE — OUTREACH NOTE
Medical Week 1 Survey      Flowsheet Row Responses   [facility] [place] facility patient discharged from? [facility]   Does the patient have one of the following disease processes/diagnoses(primary or secondary)? Other   Week 1 attempt successful? No   Unsuccessful attempts Attempt 1   Revoke Readmitted            EMANUEL PINA - Registered Nurse

## 2025-02-07 NOTE — PLAN OF CARE
Goal Outcome Evaluation:           Progress: improving  Outcome Evaluation: Treated for the flu. Spouse at bedside.

## 2025-02-07 NOTE — ED NOTES
Nursing report ED to floor  Rachael Rodriguez  69 y.o.  female    HPI:   Chief Complaint   Patient presents with    Rapid Heart Rate     Pt states her heart rate has been high today. Pt woke up this morning feeling like it was beating out of her chest. Pt states she had some numbness to lips today.  Pt recent open heart 1/14.  Pt also recently has had the flu.             Admitting doctor:   George Fajardo MD    Admitting diagnosis:   The primary encounter diagnosis was Atrial fibrillation with rapid ventricular response. Diagnoses of Hypokalemia and Dehydration were also pertinent to this visit.    Code status:   Current Code Status       Date Active Code Status Order ID Comments User Context       2/6/2025 2002 CPR (Attempt to Resuscitate) [id number]  George Fajardo MD ED        Question Answer    Code Status (Patient has no pulse and is not breathing) CPR (Attempt to Resuscitate)    Medical Interventions (Patient has pulse or is breathing) Full Support                    Allergies:   Sulfa antibiotics and Penicillins    Isolation:  Droplet     Fall Risk:  Fall Risk Assessment was completed, and patient is at low risk for falls.   Predictive Model Details         5 (Low) Factor Value    Calculated 2/6/2025 20:11 Age 69    Risk of Fall Model Active Peripheral IV Present     Imaging order in this encounter Present     Magnesium 1.8 mg/dL     Moses Scale not on file     Number of Distinct Medication Classes administered 2     Diastolic BP 74     Albumin 3.9 g/dL     Cardiac Assessment X     Creatinine 0.65 mg/dL     Chloride 106 mmol/L     Days after Admission 0.104     Calcium 9.6 mg/dL     Potassium 3.2 mmol/L     Total Bilirubin 0.2 mg/dL     Respiratory Rate 15     ALT 14 U/L         Weight:       02/06/25  1734   Weight: 64 kg (141 lb)       Intake and Output  No intake or output data in the 24 hours ending 02/06/25 2011    Diet:        Most recent vitals:   Vitals:    02/06/25 1955 02/06/25 2000 02/06/25  2005 02/06/25 2010   BP: 152/84 124/77 131/74    Pulse: 107 88 95 89   Resp:       Temp:       TempSrc:       SpO2: 93% 97% 97% 97%   Weight:       Height:           Active LDAs/IV Access:   Lines, Drains & Airways       Active LDAs       Name Placement date Placement time Site Days    Peripheral IV 02/06/25 1752 Left;Posterior Wrist 02/06/25 1752  Wrist  less than 1                    Skin Condition:   Skin Assessments (last day)       None             Labs (abnormal labs have a star):   Labs Reviewed   COMPREHENSIVE METABOLIC PANEL - Abnormal; Notable for the following components:       Result Value    Potassium 3.2 (*)     BUN/Creatinine Ratio 26.2 (*)     All other components within normal limits    Narrative:     GFR Categories in Chronic Kidney Disease (CKD)      GFR Category          GFR (mL/min/1.73)    Interpretation  G1                     90 or greater         Normal or high (1)  G2                      60-89                Mild decrease (1)  G3a                   45-59                Mild to moderate decrease  G3b                   30-44                Moderate to severe decrease  G4                    15-29                Severe decrease  G5                    14 or less           Kidney failure          (1)In the absence of evidence of kidney disease, neither GFR category G1 or G2 fulfill the criteria for CKD.    eGFR calculation 2021 CKD-EPI creatinine equation, which does not include race as a factor   TROPONIN - Abnormal; Notable for the following components:    HS Troponin T 68 (*)     All other components within normal limits    Narrative:     High Sensitive Troponin T Reference Range:  <14.0 ng/L- Negative Female for AMI  <22.0 ng/L- Negative Male for AMI  >=14 - Abnormal Female indicating possible myocardial injury.  >=22 - Abnormal Male indicating possible myocardial injury.   Clinicians would have to utilize clinical acumen, EKG, Troponin, and serial changes to determine if it is an Acute  Myocardial Infarction or myocardial injury due to an underlying chronic condition.        CBC WITH AUTO DIFFERENTIAL - Abnormal; Notable for the following components:    MCH 26.1 (*)     MCHC 30.7 (*)     Monocyte % 13.2 (*)     Eosinophil % 0.0 (*)     All other components within normal limits   MAGNESIUM - Normal   PROTIME-INR - Normal   APTT - Normal   BNP (IN-HOUSE) - Normal    Narrative:     This assay is used as an aid in the diagnosis of individuals suspected of having heart failure. It can be used as an aid in the diagnosis of acute decompensated heart failure (ADHF) in patients presenting with signs and symptoms of ADHF to the emergency department (ED). In addition, NT-proBNP of <300 pg/mL indicates ADHF is not likely.    Age Range Result Interpretation  NT-proBNP Concentration (pg/mL:      <50             Positive            >450                   Gray                 300-450                    Negative             <300    50-75           Positive            >900                  Gray                300-900                  Negative            <300      >75             Positive            >1800                  Gray                300-1800                  Negative            <300   TSH RFX ON ABNORMAL TO FREE T4 - Normal   HIGH SENSITIVITIY TROPONIN T 1HR   CBC AND DIFFERENTIAL    Narrative:     The following orders were created for panel order CBC & Differential.  Procedure                               Abnormality         Status                     ---------                               -----------         ------                     CBC Auto Differential[[id number]]        Abnormal            Final result                 Please view results for these tests on the individual orders.   EXTRA TUBES    Narrative:     The following orders were created for panel order Extra Tubes.  Procedure                               Abnormality         Status                     ---------                                -----------         ------                     Gold Top - SST[[id number]]                                   Final result                 Please view results for these tests on the individual orders.   [facility]   EXTRA TUBES    Narrative:     The following orders were created for panel order Extra Tubes.  Procedure                               Abnormality         Status                     ---------                               -----------         ------                     Lavender Top[[id number]]                                     Final result                 Please view results for these tests on the individual orders.   LAVENDER TOP       LOC: Person, Place, Time, and Situation    Telemetry:  Observation Unit    Cardiac Monitoring Ordered: yes    EKG:   ECG 12 Lead Rhythm Change   Preliminary Result   HEART RATE=93  bpm   RR Dnejzcbt=279  ms   NJ Xfefmiei=841  ms   P Horizontal Axis=-13  deg   P Front Axis=71  deg   QRSD Rumexvsc=243  ms   QT Amfieoos=380  ms   IObB=836  ms   QRS Axis=48  deg   T Wave Axis=235  deg   - ABNORMAL ECG -   Sinus rhythm   Left atrial enlargement   Left bundle branch block   ST elevation secondary to IVCD   When compared with ECG of 06-Feb-2025 17:44:01,   Significant change in rhythm   Significant repolarization change   Significant axis, voltage or hypertrophy change   Date and Time of Study:2025-02-06 18:43:33      ECG 12 Lead Rhythm Change   Preliminary Result   HEART QNBN=707  bpm   RR Wjdwsoaf=163  ms   NJ Interval=54  ms   P Horizontal Axis=  deg   P Front Axis=0  deg   QRSD Daydzkwd=048  ms   QT Lwwadprb=976  ms   VJgD=546  ms   QRS Axis=27  deg   T Wave Axis=201  deg   - ABNORMAL ECG -   Wide-QRS tachycardia   Left bundle branch block   Date and Time of Study:2025-02-06 17:44:01          Medications Given in the ED:   Medications   sodium chloride 0.9 % flush 10 mL (has no administration in time range)   dilTIAZem (CARDIZEM) 125 mg in 125 mL sodium chloride  infusion (0  mg/hr Intravenous Stopped 2/6/25 1949)   Potassium Replacement - Follow Nurse / BPA Driven Protocol (has no administration in time range)   sodium chloride 0.9 % bolus 1,000 mL (0 mL Intravenous Stopped 2/6/25 1949)   dilTIAZem (CARDIZEM) bolus from bag 1 mg/mL solution 10 mg (10 mg Intravenous Bolus from Bag 2/6/25 1832)       Imaging results:  XR Chest 1 View    Result Date: 2/6/2025  Impression: No acute cardiopulmonary abnormality. Electronically Signed: Latoya Delgado MD  2/6/2025 6:25 PM EST  Workstation ID: [id number]     Social issues:   Social History     Socioeconomic History    Marital status:    Tobacco Use    Smoking status: Never     Passive exposure: Never    Smokeless tobacco: Never   Vaping Use    Vaping status: Never Used   Substance and Sexual Activity    Alcohol use: Never    Drug use: Never    Sexual activity: Defer       NIH Stroke Scale:  Interval: (not recorded)  1a. Level of Consciousness: (not recorded)  1b. LOC Questions: (not recorded)  1c. LOC Commands: (not recorded)  2. Best Gaze: (not recorded)  3. Visual: (not recorded)  4. Facial Palsy: (not recorded)  5a. Motor Arm, Left: (not recorded)  5b. Motor Arm, Right: (not recorded)  6a. Motor Leg, Left: (not recorded)  6b. Motor Leg, Right: (not recorded)  7. Limb Ataxia: (not recorded)  8. Sensory: (not recorded)  9. Best Language: (not recorded)  10. Dysarthria: (not recorded)  11. Extinction and Inattention (formerly Neglect): (not recorded)    Total (NIH Stroke Scale): (not recorded)     Additional notable assessment information:       Nursing report ED to floor:  Hawa Jefferson RN   02/06/25 20:11 EST

## 2025-02-10 NOTE — CASE MANAGEMENT/SOCIAL WORK
Case Management Discharge Note      Final Note: Routine home                          Transportation Services  Private: Car    Final Discharge Disposition Code: 01 - home or self-care

## 2025-02-11 ENCOUNTER — OFFICE VISIT (OUTPATIENT)
Dept: CARDIOLOGY | Facility: CLINIC | Age: 70
End: 2025-02-11
Payer: MEDICARE

## 2025-02-11 VITALS
HEART RATE: 74 BPM | OXYGEN SATURATION: 99 % | BODY MASS INDEX: 30.85 KG/M2 | HEIGHT: 57 IN | SYSTOLIC BLOOD PRESSURE: 132 MMHG | DIASTOLIC BLOOD PRESSURE: 60 MMHG | WEIGHT: 143 LBS

## 2025-02-11 DIAGNOSIS — I48.0 PAROXYSMAL ATRIAL FIBRILLATION: Primary | ICD-10-CM

## 2025-02-11 DIAGNOSIS — I44.7 LBBB (LEFT BUNDLE BRANCH BLOCK): ICD-10-CM

## 2025-02-11 DIAGNOSIS — Z95.2 S/P AVR: ICD-10-CM

## 2025-02-11 NOTE — PROGRESS NOTES
[facility] CARD[facility]      REASON FOR FOLLOW-UP:  Hospital follow-up          Chief Complaint   Patient presents with    Heart Problem         Dear Alvaro Cardona MD        Heart Problem     It was my pleasure to see Rachael Rodriguez in the office today.  She is a 69-year-old female with known history of severe aortic valve stenosis status post aortic valve replacement with Medtronic pericardial prosthesis 27 mm and left atrial appendage closure with atrial clip on 1/14/2025.  Heart cath prior to surgery with no evidence of occlusive coronary disease.  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  History of hypertension, dyslipidemia, moderate to severe pulmonary hypertension, mild mitral valve insufficiency.     Ms. Rodriguez was diagnosed with influenza A with estimated symptom onset 2/2/25.  She  took a couple doses of Tamiflu and had severe side effects so she discontinued the medication.  She presented to the emergency department 2/6/25 with complaint of palpitations.  She reported awakening the morning prior with a sensation of her heart beating very hard and fast.  She denied any manny chest pain, pressure or tightness, no shortness of breath.  She reported that her legs felt very weak that day upon awakening.  She denied any falls, near syncopal or syncopal episodes.  EKG performed showed regular, wide-complex tachycardia in the setting of left bundle branch block which is chronic.  She was given 10 mg bolus IV diltiazem, 25 mg p.o. Lopressor.  Her heart rhythm converted to sinus approximately 6:30 that evening.  There was no significant electrolyte derangement.  Beta-blocker dose was increased to twice daily.  She was discharged home and presents today in follow-up.    The patient is scheduled for her first follow-up with cardiothoracic surgery NP on Thursday.  She has several questions that I explained would be answered at her follow-up  "surgery visit.  She stated today that she has had no further palpitations.  She does continue to have report legs feeling \"wobbly\".  She denies any falls, dizziness or lightheadedness.  She denies any shortness of breath, orthopnea, lower extremity edema.  She reports some discomfort in her shoulders and inability to get comfortable at night.      ASSESSMENT:  Atrial fibrillation with rapid ventricular response-new onset 2/6/2025  Severe nonrheumatic aortic valve stenosis/mild to moderate nonrheumatic mitral valve insufficiency  Nonischemic cardiomyopathy with EF 45-50% (IntraOp BENJAMIN)  Status post elective tissue AVR with left atrial appendage -atrial clip  Primary hypertension with grade 2 diastolic dysfunction  Dyslipidemia-statin intolerance    PLAN:  Continue current beta-blocker dose-Toprol 25 mg every 12 hours.  Continue aspirin  Follow-up with cardiothoracic surgery as scheduled  Patient will benefit from cardiac rehab.  She should avoid sitting for long periods.  Follow-up with primary cardiologist in 3 months        CHF Guideline Directed Medical Therapy  Beta Blocker:   ARNI/ACE/ARB:   SGLT 2 inhibitors:   Diuretics:   Aldosterone Antagonist:   Vasodilators & Nitrates:       Diagnoses and all orders for this visit:    1. Paroxysmal atrial fibrillation (Primary)    2. S/P tissue AVR with left atrial appendage ligation per Dr. Mcbride 1/14/2025    3. LBBB (left bundle branch block)          The following portions of the patient's history were reviewed and updated as appropriate: allergies, current medications, past family history, past medical history, past social history, past surgical history, and problem list.    REVIEW OF SYSTEMS:    Review of Systems   Constitutional:        Deconditioning and weakness   All other systems reviewed and are negative.      Vitals:    02/11/25 1127   BP: 132/60   Pulse: 74   SpO2: 99%         PHYSICAL EXAM:    General: Alert, cooperative, no distress, appears stated " age  Head:  Normocephalic, atraumatic, mucous membranes moist  Eyes:  Conjunctiva/corneas clear, EOM's intact     Neck:  Supple,  no JVD or bruit     Lungs: Clear to auscultation bilaterally, no wheezes rhonchi rales are noted  Chest wall: No tenderness  Musculoskeletal:   Ambulates freely without assistance  Heart::  Regular rate and rhythm, S1 and S2 normal, no murmur, rub or gallop  Abdomen: Soft, non-tender, nondistended, bowel sounds active, no abdominal bruit  Extremities: No cyanosis, clubbing, or edema   Pulses: 2+ and symmetric all extremities  Skin:  No rashes or lesions  Neuro/psych: A&O x3. CN II through XII are grossly intact with appropriate affect        Past Medical History:   Diagnosis Date    Hypertension        Past Surgical History:   Procedure Laterality Date    AORTIC VALVE REPAIR/REPLACEMENT N/A 1/14/2025    Procedure: AORTIC VALVE REPLACEMENT with 23mm Avalus Valve;  Surgeon: Zac Mcbride MD;  Location: [facility];  Service: Cardiothoracic;  Laterality: N/A;    CARDIAC CATHETERIZATION Right 12/30/2024    Procedure: Right and Left Heart Cath;  Surgeon: Makayla Driscoll MD;  Location: [facility] CATH INVASIVE LOCATION;  Service: Cardiovascular;  Laterality: Right;  Local and IV sedation    TRANSESOPHAGEAL ECHOCARDIOGRAM (BENJAMIN) N/A 1/14/2025    Procedure: TRANSESOPHAGEAL ECHOCARDIOGRAM WITH ANESTHESIA;  Surgeon: Zac Mcbride MD;  Location: [facility];  Service: Cardiothoracic;  Laterality: N/A;         Current Outpatient Medications:     acetaminophen (TYLENOL) 325 MG tablet, Take 2 tablets by mouth Every 6 (Six) Hours As Needed for Mild Pain or Moderate Pain., Disp: 30 tablet, Rfl: 0    aspirin 81 MG EC tablet, Take 1 tablet by mouth Daily for 90 days., Disp: 30 tablet, Rfl: 2    melatonin 5 MG tablet tablet, Take  by mouth At Night As Needed., Disp: , Rfl:     metoprolol succinate XL (Toprol XL) 25 MG 24 hr tablet, Take 1 tablet by mouth 2 (Two) Times a Day., Disp: 60 tablet,  "Rfl: 1  No current facility-administered medications for this visit.    Facility-Administered Medications Ordered in Other Visits:     Chlorhexidine Gluconate Cloth 2 % pads 1 Application, 1 Application, Topical, Q12H PRN, Bakari Solomon PA-C    Allergies   Allergen Reactions    Statins Myalgia    Sulfa Antibiotics GI Intolerance    Penicillins Rash       Family History   Problem Relation Age of Onset    Heart attack Father     Atrial fibrillation Father        Social History     Tobacco Use    Smoking status: Never     Passive exposure: Never    Smokeless tobacco: Never   Substance Use Topics    Alcohol use: Never           Current Electrocardiogram:  Procedures        EMR Dragon/Transcription:   \"Dictated utilizing Dragon dictation\".     Copied text in this note has been reviewed by me and is accurate as of 02/11/25.    "

## 2025-02-13 ENCOUNTER — OFFICE VISIT (OUTPATIENT)
Dept: CARDIAC SURGERY | Facility: CLINIC | Age: 70
End: 2025-02-13
Payer: MEDICARE

## 2025-02-13 VITALS
SYSTOLIC BLOOD PRESSURE: 155 MMHG | HEIGHT: 58 IN | OXYGEN SATURATION: 98 % | HEART RATE: 74 BPM | BODY MASS INDEX: 29.97 KG/M2 | WEIGHT: 142.8 LBS | DIASTOLIC BLOOD PRESSURE: 96 MMHG | RESPIRATION RATE: 18 BRPM | TEMPERATURE: 97.7 F

## 2025-02-13 DIAGNOSIS — Z98.890 S/P LEFT ATRIAL APPENDAGE LIGATION: ICD-10-CM

## 2025-02-13 DIAGNOSIS — Z95.2 S/P AVR: Primary | ICD-10-CM

## 2025-02-13 NOTE — PROGRESS NOTES
"CARDIOVASCULAR SURGERY FOLLOW-UP PROGRESS NOTE  Chief Complaint: Post-op Follow Up        HPI:   Dear Dr. Cardona, Alvaro Farias MD and colleagues:    It was nice to see Rachael Rodriguez in follow up today after cardiac surgery.  As you know, she is a 69 y.o. female with severe aortic stenosis who underwent AVR with a 27 mm Avalus Ultra Medtronic pericardial prosthesis and left atrial appendage epicardial closure with a 45 mm atrial clip device at [facility] by Dr. Mcbride on 1/14/25. She did well postoperatively, but has had two admissions since being discharged home from the hospital. She was hospitalized from 2/4/25 - 2/5/25 with Influenza A and then was hospitalized from 2/6/25 - 2/7/25 with atrial fibrillation with RVR. Patient was dehydrated at the time and her potassium and magnesium were low. She states her magnesium and potassium were replaced and that evening she coughed and converted back to sinus rhythm. She was discharged home on a Zio patch, which she wears until 2/21/25. She denies further episodes of palpitations or tachycardia. She comes in today for routine post-op follow-up stating her legs are still weak and she still has some soreness in her chest. She also reports feeling depressed that she has had several set backs since surgery. Her activity level has been fair. She was seen by ALEX Hooper with Cardiology on 2/11/25.  She has not started Cardiac Rehab yet, but is interested in doing so. Her blood pressure is elevated today at 155/96. Repeat was 148/99. She states she has \"white coat syndrome.\" She checks her blood pressure regularly at home and states it is always normal and actually tends to run on the lower side. Her blood pressure in the Cardiology office on 2/11/25 was 132/60. She is taking her aspirin and beta blocker as prescribed and keeping her incision clean and dry. She states she was sent home with nocturnal oxygen, but hasn't been using it in the past week since she has been " "maintaining an oxygen saturation of 93-94% during sleep.      Physical Exam:         /96 (BP Location: Left arm, Patient Position: Sitting, Cuff Size: Adult)   Pulse 74   Temp 97.7 °F (36.5 °C)   Resp 18   Ht 146.1 cm (57.5\")   Wt 64.8 kg (142 lb 12.8 oz)   SpO2 98%   BMI 30.37 kg/m²   Heart:  regular rate and rhythm, S1, S2 normal, no murmur, click, rub or gallop  Lungs:  clear to auscultation bilaterally  Extremities:  no edema  Incision(s):  mid chest healing well without erythema or drainage, sternum stable    Assessment/Plan:     S/P AVR & left atrial appendage epicardial closure. Overall, she is doing very well. Unfortunately she had Influenza A and then Afib with RVR since her heart surgery, so she's had multiple things to recover from. She is finally starting to feel better, and is anxious to start Cardiac Rehab to help build her strength and stamina. A referral to Cardiac Rehab has already been made, but we will send a notice that she is cleared to get started. Since she no longer seems to be requiring nocturnal oxygen, I gave her permission to discontinue it. Her  is going to call the oxygen supply company to return it. I reassured her that she is recovering well from her surgery and that she will continue to feel better in the weeks to come.       No heavy lifting > 10 pounds for 2 more weeks.  Keep incisions clean and dry.  OK to drive if not taking narcotic pain medicine.  Follow-up as scheduled with cardiology.  Follow-up as scheduled with PCP.  Follow-up with CT surgery prn.    Continue lifting restriction of 10 lbs until 6 weeks and 50 lbs until 12 weeks from the date of surgery. No excessive jarring motions or twisting motions until 12 weeks from the date of surgery.      Thank you for allowing me to participate in the care of your patient.    Regards,  VASILIY Sutton    "

## 2025-02-18 ENCOUNTER — TELEPHONE (OUTPATIENT)
Dept: CARDIOLOGY | Facility: CLINIC | Age: 70
End: 2025-02-18

## 2025-02-18 DIAGNOSIS — Z95.2 S/P AVR: Primary | ICD-10-CM

## 2025-02-18 NOTE — TELEPHONE ENCOUNTER
Returned call- will contact PCP for oxygen DC, will cont to monitor BP and let us know if her numbers are staying over 150/90s.

## 2025-02-18 NOTE — TELEPHONE ENCOUNTER
Caller: NICOLE    Relationship:     Best call back number: [phone]        What was the call regarding: REQUESTING A LETTER TO BE SENT TO [facility] STATING THAT PT NO LONGER NEEDS OXYGEN.    LEVELS ARE 97/98 NOW AND [facility] NEEDS DOCUMENTATION IT IS OKAY TO STOP SENDING OXYGEN TO PATIENT.          HAD SOME CONCERNS ABOUT A HIGH  BP READING YESTERDAY:    TODAY/YESTERDAY  140/78 133/77 148/11 139/81 157/87   LAST NIGHT 187/90      HR IN THE 80'S

## 2025-02-28 ENCOUNTER — TELEPHONE (OUTPATIENT)
Dept: CARDIAC REHAB | Facility: HOSPITAL | Age: 70
End: 2025-02-28
Payer: MEDICARE

## 2025-03-03 ENCOUNTER — OFFICE VISIT (OUTPATIENT)
Dept: CARDIAC REHAB | Facility: HOSPITAL | Age: 70
End: 2025-03-03
Payer: MEDICARE

## 2025-03-03 DIAGNOSIS — Z95.2 S/P AVR: Primary | ICD-10-CM

## 2025-03-03 LAB
CV ZIO BASELINE AVG BPM: 81 BPM
CV ZIO BASELINE BPM HIGH: 182 BPM
CV ZIO BASELINE BPM LOW: 51 BPM
CV ZIO DEVICE ANALYSIS TIME: NORMAL
CV ZIO ECT SVE COUNT: 263 EPISODES
CV ZIO ECT SVE CPLT COUNT: 16 EPISODES
CV ZIO ECT SVE CPLT FREQ: NORMAL
CV ZIO ECT SVE FREQ: NORMAL
CV ZIO ECT SVE TPLT COUNT: 0 EPISODES
CV ZIO ECT SVE TPLT FREQ: 0
CV ZIO ECT VE COUNT: 333 EPISODES
CV ZIO ECT VE CPLT COUNT: 0 EPISODES
CV ZIO ECT VE CPLT FREQ: 0
CV ZIO ECT VE FREQ: NORMAL
CV ZIO ECT VE TPLT COUNT: 0 EPISODES
CV ZIO ECT VE TPLT FREQ: 0
CV ZIO ECTOPIC SVE COUPLET RAW PERCENT: 0 %
CV ZIO ECTOPIC SVE ISOLATED PERCENT: 0.02 %
CV ZIO ECTOPIC SVE TRIPLET RAW PERCENT: 0 %
CV ZIO ECTOPIC VE COUPLET RAW PERCENT: 0 %
CV ZIO ECTOPIC VE ISOLATED PERCENT: 0.02 %
CV ZIO ECTOPIC VE TRIPLET RAW PERCENT: 0 %
CV ZIO ENROLLMENT END: NORMAL
CV ZIO PATIENT EVENTS DIARIES: 1
CV ZIO PATIENT EVENTS TRIGGERS: 2
CV ZIO PAUSE COUNT: 0
CV ZIO PRESCRIPTION STATUS: NORMAL
CV ZIO SVT AVG BPM: 166 BPM
CV ZIO SVT BPM HIGH: 182 BPM
CV ZIO SVT BPM LOW: 138 BPM
CV ZIO SVT COUNT: 1
CV ZIO SVT F EPI AVG BPM: 166 BPM
CV ZIO SVT F EPI BEATS: 7 BEATS
CV ZIO SVT F EPI BPM HIGH: 182 BPM
CV ZIO SVT F EPI BPM LOW: 138 BPM
CV ZIO SVT F EPI DUR: 2.5 SEC
CV ZIO SVT F EPI END: NORMAL
CV ZIO SVT F EPI START: NORMAL
CV ZIO SVT L EPI AVG BPM: 166 BPM
CV ZIO SVT L EPI BEATS: 7 BEATS
CV ZIO SVT L EPI BPM HIGH: 182 BPM
CV ZIO SVT L EPI BPM LOW: 138 BPM
CV ZIO SVT L EPI DUR: 2.5 SEC
CV ZIO SVT L EPI END: NORMAL
CV ZIO SVT L EPI START: NORMAL
CV ZIO TOTAL  ENROLLMENT PERIOD: NORMAL
CV ZIO VT COUNT: 0

## 2025-03-03 PROCEDURE — 93798 PHYS/QHP OP CAR RHAB W/ECG: CPT

## 2025-03-04 ENCOUNTER — OFFICE VISIT (OUTPATIENT)
Dept: CARDIOLOGY | Facility: CLINIC | Age: 70
End: 2025-03-04
Payer: MEDICARE

## 2025-03-04 VITALS
OXYGEN SATURATION: 99 % | SYSTOLIC BLOOD PRESSURE: 143 MMHG | WEIGHT: 146.6 LBS | DIASTOLIC BLOOD PRESSURE: 90 MMHG | HEIGHT: 58 IN | BODY MASS INDEX: 30.77 KG/M2 | HEART RATE: 88 BPM

## 2025-03-04 DIAGNOSIS — I44.7 LBBB (LEFT BUNDLE BRANCH BLOCK): ICD-10-CM

## 2025-03-04 DIAGNOSIS — Z95.2 S/P AVR: Primary | ICD-10-CM

## 2025-03-04 DIAGNOSIS — I35.0 NONRHEUMATIC AORTIC VALVE STENOSIS: ICD-10-CM

## 2025-03-04 DIAGNOSIS — I48.0 PAROXYSMAL ATRIAL FIBRILLATION: ICD-10-CM

## 2025-03-04 DIAGNOSIS — I50.23 ACUTE ON CHRONIC HFREF (HEART FAILURE WITH REDUCED EJECTION FRACTION): ICD-10-CM

## 2025-03-04 RX ORDER — LOSARTAN POTASSIUM 25 MG/1
25 TABLET ORAL DAILY
Qty: 90 TABLET | Refills: 3 | Status: SHIPPED | OUTPATIENT
Start: 2025-03-04

## 2025-03-04 RX ORDER — METOPROLOL SUCCINATE 25 MG/1
25 TABLET, EXTENDED RELEASE ORAL 2 TIMES DAILY
Qty: 60 TABLET | Refills: 1 | Status: SHIPPED | OUTPATIENT
Start: 2025-03-04

## 2025-03-04 NOTE — TELEPHONE ENCOUNTER
Caller: Rachael Rodriguez    Relationship: Self    Best call back number: [phone]    Requested Prescriptions:   Requested Prescriptions     Pending Prescriptions Disp Refills    metoprolol succinate XL (Toprol XL) 25 MG 24 hr tablet 60 tablet 1     Sig: Take 1 tablet by mouth 2 (Two) Times a Day.        Pharmacy where request should be sent: [facility] [id number] [address] AT [facility] [address] [place] [address] - [phone] [place] [phone] FX     Last office visit with prescribing clinician: 3/4/2025   Last telemedicine visit with prescribing clinician: Visit date not found   Next office visit with prescribing clinician: 6/11/2025         Jc Faria Rep   03/04/25 15:17 EST

## 2025-03-04 NOTE — PROGRESS NOTES
Cardiology Office Visit      Encounter Date:  03/04/2025    Patient ID:   Rachael Rodriguez is a 70 y.o. female.    Reason For Followup:  Nonrheumatic valvular aortic stenosis    Brief Clinical History:  Dear Dr. Cardona, Alvaro Farias MD    I had the pleasure of seeing Rachael Rodriguez today. As you are well aware, this is a 70 y.o. female  with known history of severe aortic valve stenosis status post aortic valve replacement with [employer] pericardial prosthesis 27 mm and left atrial appendage closure with atrial clip on 1/14/2025.  Heart cath prior to surgery with no evidence of occlusive coronary disease.  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  History of hypertension, dyslipidemia, moderate to severe pulmonary hypertension, mild mitral valve insufficiency.      Ms. Rodriguez was diagnosed with influenza A with estimated symptom onset 2/2/25.  She  took a couple doses of Tamiflu and had severe side effects so she discontinued the medication.  She presented to the emergency department 2/6/25 with complaint of palpitations.  She reported awakening the morning prior with a sensation of her heart beating very hard and fast.  She denied any manny chest pain, pressure or tightness, no shortness of breath.  She reported that her legs felt very weak that day upon awakening.  She denied any falls, near syncopal or syncopal episodes.  EKG performed showed regular, wide-complex tachycardia in the setting of left bundle branch block which is chronic.  She was given 10 mg bolus IV diltiazem, 25 mg p.o. Lopressor.  Her heart rhythm converted to sinus approximately 6:30 that evening.  There was no significant electrolyte derangement.  Beta-blocker dose was increased to twice daily.  She was discharged home and presents today in follow-up.     Interval History:  Denies any new complaints  Denies any symptoms of chest pain shortness of breath dizziness or syncope  Denies any further  "palpitations  Home blood pressure readings are somewhat elevated  Patient has been compliant with medical therapy      Assessment & Plan    Impressions:  Severe nonrheumatic valvular aortic stenosis status post arctic valve replacement surgery  S/p elective tissue AVR with a left atrial appendage ligation with atrial clip procedure  Hypertension  Hyperlipidemia  Diastolic dysfunction  Paroxysmal/postop atrial fibrillation currently in sinus rhythm      Recommendations:  Continue Toprol-XL 25 mg p.o. twice daily  Add losartan 25 mg p.o. once a day for better control of blood pressure  Repeat echocardiogram to reassess the baseline aortic valve function after the aortic valve replacement surgery and also to assess LV systolic function which was low documented at 45% in the past  Need for antibiotic prophylaxis for dental procedures reviewed and discussed patient  Risk benefits and alternatives reviewed and discussed with patient  Schedule for cardiac rehab  Okay not to use oxygen supplements anymore  Recent Holter monitor study with no recurrence of atrial fibrillation  Diagnosis and treatment options reviewed and discussed with patient  Current medications include aspirin 325 mg p.o. once a day which is going to be switched to 81 mg p.o. once a day and patient is on losartan 25 mg p.o. once a day and patient is on Toprol-XL 25 mg p.o. twice daily  If the blood pressure readings are optimal we will consider decreasing the dose of Toprol-XL from twice a day to once a day  Continue cardiac rehab  Recent labs and workup reviewed and discussed with patient  Follow-up in office in 3 months      Vitals:  Vitals:    03/04/25 1017   BP: 143/90   Pulse: 88   SpO2: 99%   Weight: 66.5 kg (146 lb 9.6 oz)   Height: 146.1 cm (57.5\")       Physical Exam:    General: Alert, cooperative, no distress, appears stated age  Head:  Normocephalic, atraumatic, mucous membranes moist  Eyes:  Conjunctiva/corneas clear, EOM's intact   "   Neck:  Supple,  no adenopathy;      Lungs: Clear to auscultation bilaterally, no wheezes rhonchi rales are noted  Chest wall: No tenderness  Heart::  Regular rate and rhythm, S1 and S2 normal, no murmur, rub or gallop  Abdomen: Soft, non-tender, nondistended bowel sounds active  Extremities: No cyanosis, clubbing, or edema  Pulses: 2+ and symmetric all extremities  Skin:  No rashes or lesions  Neuro/psych: A&O x3. CN II through XII are grossly intact with appropriate affect              Lab Results   Component Value Date    GLUCOSE 108 (H) 02/07/2025    BUN 15 02/07/2025    CREATININE 0.60 02/07/2025    EGFR 97.9 02/07/2025    BCR 25.0 02/07/2025    K 3.7 02/07/2025    CO2 23.2 02/07/2025    CALCIUM 9.3 02/07/2025    ALBUMIN 3.9 02/06/2025    BILITOT 0.2 02/06/2025    AST 31 02/06/2025    ALT 14 02/06/2025     Results for orders placed in visit on 01/14/25    Intra-Op Anesthesia BENJAMIN    Narrative  Intra-Op Anesthesia BENJAMIN    Procedure Performed: Intra-Op Anesthesia BENJAMIN  Start Time:  End Time:    Preanesthesia Checklist:  Patient identified, IV assessed, risks and benefits discussed, monitors and equipment assessed, procedure being performed at surgeon's request and anesthesia consent obtained.    General Procedure Information  BENJAMIN Placed for monitoring purposes only -- This is not a diagnostic BENJAMIN  Diagnostic Indications for Echo:  assessment of surgical repair and hemodynamic monitoring  Physician Requesting Echo: Zac Mcbride MD  Location performed:  OR  Intubated  Bite block placed  Heart visualized  Probe Insertion:  Easy  Probe Type:  Multiplane  Modalities:  Color flow mapping and continuous wave Doppler    Echocardiographic and Doppler Measurements    Ventricles    Right Ventricle:  Cavity size normal.  Thrombus not present.  Global function mildly impaired.  Left Ventricle:  Cavity size normal.  Thrombus not present.  Global Function mildly impaired.  Ejection Fraction 50%.        Valves    Aortic  Valve:  Annulus normal.  Stenosis not present.  Area: 1.1 cm².  Mean Gradient: 42 mmHg.  Regurgitation absent.  Leaflets normal.  Leaflet motions normal.    Mitral Valve:  Annulus normal.  Stenosis not present.  Regurgitation trace.  Leaflets normal.  Leaflet motions normal.    Tricuspid Valve:  Annulus normal.  Stenosis not present.  Regurgitation absent.  Leaflets normal.  Leaflet motions normal.  Pulmonic Valve:  Annulus normal.  Stenosis not present.  Regurgitation absent.      Aorta    Ascending Aorta:  Size normal.  Dissection not present.  Aortic Arch:  Size normal.  Dissection not present.  Descending Aorta:  Size normal.  Dissection not present.      Atria    Right Atrium:  Size normal.  Spontaneous echo contrast not present.  Thrombus not present.  Tumor not present.  Device not present.    Left Atrium:  Size normal.  Spontaneous echo contrast not present.  Thrombus not present.  Tumor not present.  Device not present.  Left atrial appendage normal.      Septa        Ventricular Septum:  Intra-ventricular septum morphology normal.        Other Findings  Pericardium:  normal  Pleural Effusion:  none      Anesthesia Information  Performed Personally  Anesthesiologist:  Yogesh Doan MD      Echocardiogram Comments:  Post bypass EF 50%. No perivalvular leak noted. Mean gradient 9.42. Trace MR. No AI.     Results for orders placed during the hospital encounter of 12/28/24    Cardiac Catheterization/Vascular Study    Conclusion  Table formatting from the original result was not included.  Cardiac Catheterization Operative Report    Rachael Rodriguez  [id number]  12/30/2024  @PCP@    She underwent cardiac catheterization.    Indications for the procedure include: Valvular heart disease with severe aortic stenosis.    Results for orders placed during the hospital encounter of 12/28/24    Adult Transthoracic Echo Complete W/ Cont if Necessary Per Protocol    Interpretation Summary    Left ventricular  ejection fraction appears to be 46 - 50%.    Left ventricular diastolic function is consistent with (grade II w/high LAP) pseudonormalization.    The left atrial cavity is moderate to severely dilated.    Severe aortic valve stenosis is present.    Moderate mitral valve regurgitation is present.    Estimated right ventricular systolic pressure from tricuspid regurgitation is moderately elevated (45-55 mmHg).    Moderate to severe pulmonary hypertension is present.    No results found for this or any previous visit.        Procedure Details:  The risks, benefits, complications, treatment options, and expected outcomes were discussed with the patient. The patient and/or family concurred with the proposed plan, giving informed consent.    After informed consent the patient was brought to the cath lab after appropriate IV hydration was begun and oral premedication was given. She was further sedated with fentanyl. She was prepped and draped in the usual manner. Using the modified Seldinger access technique, a 6 Sami sheath was placed in the femoral artery. A left heart catheterization with coronary arteriography was performed. Findings are discussed below.    7 Sami venous sheath was placed in the right femoral vein using ultrasound guidance and then we used a 7 Sami chest tube Endeavor-Oscar catheter to the right heart catheterization and hemodynamic pressure measurements.    After the procedure was completed, sedation was stopped and the sheaths and catheters were all removed. Hemostasis was achieved per established hospital protocols.    Conscious sedation:  Conscious sedation was performed according to protocol.  I supervised and directed an independent trained observer with the assistance of monitoring the patient's level of consciousness and physiologic status throughout the procedure.  Intraoperative service time was 90 minutes.    Findings:    Hemodynamics Central aortic pressure systolic 146 diastolic 88 with a  mean pressure of 150 mmHg      Right heart catheterization with hemodynamics      Pulmonary capillary wedge pressure was 20 mmHg  PA pressure systolic 45 diastolic 28 with a mean pressure of 36 mmHg  RV pressure systolic 40 mmHg  Right atrial pressure was 8 mmHg  PA saturation was 73%  Right atrial saturation was of 69%  Aortic saturation was 96%  Silvia equation cardiac output was 4.3 L/min  Silvia equation cardiac index was 2.6 L/min/m²  Pulmonary vascular resistance was 239 dynes per second  Systemic vascular resistance was 2002 dynes per second  Left Main Large-caliber vessel angiographically normal bifurcates into left anterior descending and left circumflex arteries  RCA Large-caliber vessel angiographically normal  LAD Large-caliber vessel angiographically normal  Circ Large-caliber vessel angiographically normal  LV Not done  Coronary Dominance Right coronary artery    Estimated Blood Loss:  Minimal    Specimens: None    Complications:  None; patient tolerated the procedure well.    Disposition: PACU - hemodynamically stable.    Condition: stable    Impressions:  Normal coronaries  Moderate pulmonary hypertension    Recommendations:  Further evaluation for TAVR versus surgical AVR     Lab Results   Component Value Date    CHOL 254 (H) 01/13/2025    TRIG 114 01/13/2025    HDL 78 (H) 01/13/2025     (H) 01/13/2025       Results for orders placed during the hospital encounter of 02/06/25    Cardiac Event Monitor (CALLIE) or Mobile Cardiac Outpatient Telemetry (MCT)    Interpretation Summary    Patient was monitored for 13 days and 14 hours starting on 2/7/2025    Rhythm was sinus with an average heart rate of 81, minimum heart rate was 51, maximum heart rate 126 bpm.    No atrial fibrillation or atrial flutter, short, nonspecific runs of atrial ectopy were noted.    PVC burden was low less than 1%, no ventricular tachycardia.    Patient had baseline left bundle branch block, no significant bradycardic events or  pauses, no AV block.    Overall this is a benign monitor study.      Electronically signed by Caleb Stephens MD, 03/03/25, 5:40 PM EST.           Objective:          Allergies:  Allergies   Allergen Reactions    Statins Myalgia    Sulfa Antibiotics GI Intolerance    Penicillins Rash       Medication Review:     Current Outpatient Medications:     acetaminophen (TYLENOL) 325 MG tablet, Take 2 tablets by mouth Every 6 (Six) Hours As Needed for Mild Pain or Moderate Pain., Disp: 30 tablet, Rfl: 0    aspirin 81 MG EC tablet, Take 1 tablet by mouth Daily for 90 days., Disp: 30 tablet, Rfl: 2    metoprolol succinate XL (Toprol XL) 25 MG 24 hr tablet, Take 1 tablet by mouth 2 (Two) Times a Day., Disp: 60 tablet, Rfl: 1    losartan (Cozaar) 25 MG tablet, Take 1 tablet by mouth Daily., Disp: 90 tablet, Rfl: 3  No current facility-administered medications for this visit.    Facility-Administered Medications Ordered in Other Visits:     Chlorhexidine Gluconate Cloth 2 % pads 1 Application, 1 Application, Topical, Q12H PRN, Bakari Solomon PA-C    Family History:  Family History   Problem Relation Age of Onset    Heart attack Father     Atrial fibrillation Father        Past Medical History:  Past Medical History:   Diagnosis Date    Hypertension        Past surgical History:  Past Surgical History:   Procedure Laterality Date    AORTIC VALVE REPAIR/REPLACEMENT N/A 1/14/2025    Procedure: AORTIC VALVE REPLACEMENT with 23mm Avalus Valve;  Surgeon: Zac Mcbride MD;  [facility];  Service: Cardiothoracic;  Laterality: N/A;    CARDIAC CATHETERIZATION Right 12/30/2024    Procedure: Right and Left Heart Cath;  Surgeon: Makayla Driscoll MD;  Location: [facility] CATH INVASIVE LOC[facility];  Service: Cardiovascular;  Laterality: Right;  Local and IV sedation    TRANSESOPHAGEAL ECHOCARDIOGRAM (BENJAMIN) N/A 1/14/2025    Procedure: TRANSESOPHAGEAL ECHOCARDIOGRAM WITH ANESTHESIA;  Surgeon: Zac Mcbride MD;  Location:   [facility];  Service: Cardiothoracic;  Laterality: N/A;       Social History:  Social History     Socioeconomic History    Marital status:    Tobacco Use    Smoking status: Never     Passive exposure: Never    Smokeless tobacco: Never   Vaping Use    Vaping status: Never Used   Substance and Sexual Activity    Alcohol use: Never    Drug use: Never    Sexual activity: Defer       Review of Systems:  The following systems were reviewed as they relate to the cardiovascular system: Constitutional, Eyes, ENT, Cardiovascular, Respiratory, Gastrointestinal, Integumentary, Neurological, Psychiatric, Hematologic, Endocrine, Musculoskeletal, and Genitourinary. The pertinent cardiovascular findings are reported above with all other cardiovascular points within those systems being negative.    Diagnostic Study Review:     Current Electrocardiogram:  Procedures          NOTE: The following portions of the patient's history were reviewed and updated this visit as appropriate: allergies, current medications, past family history, past medical history, past social history, past surgical history and problem list.

## 2025-03-05 ENCOUNTER — TREATMENT (OUTPATIENT)
Dept: CARDIAC REHAB | Facility: HOSPITAL | Age: 70
End: 2025-03-05
Payer: MEDICARE

## 2025-03-05 DIAGNOSIS — Z95.2 S/P AVR: Primary | ICD-10-CM

## 2025-03-05 PROCEDURE — 93798 PHYS/QHP OP CAR RHAB W/ECG: CPT

## 2025-03-06 ENCOUNTER — HOSPITAL ENCOUNTER (OUTPATIENT)
Dept: CARDIOLOGY | Facility: HOSPITAL | Age: 70
Discharge: HOME | End: 2025-03-06
Admitting: INTERNAL MEDICINE
Payer: MEDICARE

## 2025-03-06 VITALS
HEIGHT: 58 IN | SYSTOLIC BLOOD PRESSURE: 181 MMHG | HEART RATE: 91 BPM | WEIGHT: 146.61 LBS | DIASTOLIC BLOOD PRESSURE: 97 MMHG | BODY MASS INDEX: 30.77 KG/M2

## 2025-03-06 DIAGNOSIS — Z95.2 S/P AVR: ICD-10-CM

## 2025-03-06 DIAGNOSIS — I35.0 NONRHEUMATIC AORTIC VALVE STENOSIS: ICD-10-CM

## 2025-03-06 DIAGNOSIS — I48.0 PAROXYSMAL ATRIAL FIBRILLATION: ICD-10-CM

## 2025-03-06 DIAGNOSIS — I44.7 LBBB (LEFT BUNDLE BRANCH BLOCK): ICD-10-CM

## 2025-03-06 LAB
AV MEAN PRESS GRAD SYS DOP V1V2: 7.8 MMHG
AV VMAX SYS DOP: 180.5 CM/SEC
BH CV ECHO LEFT VENTRICLE GLOBAL LONGITUDINAL STRAIN: -13 %
BH CV ECHO MEAS - ACS: 1.54 CM
BH CV ECHO MEAS - AO MAX PG: 13 MMHG
BH CV ECHO MEAS - AO ROOT DIAM: 2.6 CM
BH CV ECHO MEAS - AO V2 VTI: 34.4 CM
BH CV ECHO MEAS - AVA(I,D): 1.38 CM2
BH CV ECHO MEAS - EDV(CUBED): 52.4 ML
BH CV ECHO MEAS - EDV(MOD-SP2): 82 ML
BH CV ECHO MEAS - EDV(MOD-SP4): 55.2 ML
BH CV ECHO MEAS - EF(MOD-SP2): 51 %
BH CV ECHO MEAS - EF(MOD-SP4): 45.6 %
BH CV ECHO MEAS - ESV(CUBED): 27 ML
BH CV ECHO MEAS - ESV(MOD-SP2): 40.2 ML
BH CV ECHO MEAS - ESV(MOD-SP4): 30 ML
BH CV ECHO MEAS - FS: 19.8 %
BH CV ECHO MEAS - IVS/LVPW: 1.03 CM
BH CV ECHO MEAS - IVSD: 1.09 CM
BH CV ECHO MEAS - LA DIMENSION: 3.5 CM
BH CV ECHO MEAS - LAT PEAK E' VEL: 6 CM/SEC
BH CV ECHO MEAS - LV DIASTOLIC VOL/BSA (35-75): 35.2 CM2
BH CV ECHO MEAS - LV MASS(C)D: 128 GRAMS
BH CV ECHO MEAS - LV MAX PG: 5.3 MMHG
BH CV ECHO MEAS - LV MEAN PG: 2.6 MMHG
BH CV ECHO MEAS - LV SYSTOLIC VOL/BSA (12-30): 19.1 CM2
BH CV ECHO MEAS - LV V1 MAX: 115 CM/SEC
BH CV ECHO MEAS - LV V1 VTI: 19.4 CM
BH CV ECHO MEAS - LVIDD: 3.7 CM
BH CV ECHO MEAS - LVIDS: 3 CM
BH CV ECHO MEAS - LVOT AREA: 2.45 CM2
BH CV ECHO MEAS - LVOT DIAM: 1.77 CM
BH CV ECHO MEAS - LVPWD: 1.07 CM
BH CV ECHO MEAS - MED PEAK E' VEL: 5.2 CM/SEC
BH CV ECHO MEAS - MR MAX PG: 97.6 MMHG
BH CV ECHO MEAS - MR MAX VEL: 493.9 CM/SEC
BH CV ECHO MEAS - MR MEAN PG: 70.4 MMHG
BH CV ECHO MEAS - MR MEAN VEL: 403.8 CM/SEC
BH CV ECHO MEAS - MR VTI: 186.6 CM
BH CV ECHO MEAS - MV A MAX VEL: 124.8 CM/SEC
BH CV ECHO MEAS - MV DEC SLOPE: 470.8 CM/SEC2
BH CV ECHO MEAS - MV DEC TIME: 0.2 SEC
BH CV ECHO MEAS - MV E MAX VEL: 93.1 CM/SEC
BH CV ECHO MEAS - MV E/A: 0.75
BH CV ECHO MEAS - MV MAX PG: 5.8 MMHG
BH CV ECHO MEAS - MV MEAN PG: 2.9 MMHG
BH CV ECHO MEAS - MV V2 VTI: 27.9 CM
BH CV ECHO MEAS - MVA(VTI): 1.71 CM2
BH CV ECHO MEAS - PA ACC TIME: 0.08 SEC
BH CV ECHO MEAS - PA V2 MAX: 123.4 CM/SEC
BH CV ECHO MEAS - RAP SYSTOLE: 3 MMHG
BH CV ECHO MEAS - RV MAX PG: 3.6 MMHG
BH CV ECHO MEAS - RV V1 MAX: 95.5 CM/SEC
BH CV ECHO MEAS - RV V1 VTI: 20.9 CM
BH CV ECHO MEAS - RVDD: 2.7 CM
BH CV ECHO MEAS - RVSP: 26 MMHG
BH CV ECHO MEAS - SV(LVOT): 47.6 ML
BH CV ECHO MEAS - SV(MOD-SP2): 41.8 ML
BH CV ECHO MEAS - SV(MOD-SP4): 25.1 ML
BH CV ECHO MEAS - SVI(LVOT): 30.4 ML/M2
BH CV ECHO MEAS - SVI(MOD-SP2): 26.6 ML/M2
BH CV ECHO MEAS - SVI(MOD-SP4): 16 ML/M2
BH CV ECHO MEAS - TAPSE (>1.6): 1.26 CM
BH CV ECHO MEAS - TR MAX PG: 23 MMHG
BH CV ECHO MEAS - TR MAX VEL: 239.4 CM/SEC
BH CV ECHO MEASUREMENTS AVERAGE E/E' RATIO: 16.63

## 2025-03-06 PROCEDURE — 93356 MYOCRD STRAIN IMG SPCKL TRCK: CPT

## 2025-03-06 PROCEDURE — 93356 MYOCRD STRAIN IMG SPCKL TRCK: CPT | Performed by: INTERNAL MEDICINE

## 2025-03-06 PROCEDURE — 93306 TTE W/DOPPLER COMPLETE: CPT

## 2025-03-06 PROCEDURE — 93306 TTE W/DOPPLER COMPLETE: CPT | Performed by: INTERNAL MEDICINE

## 2025-03-07 ENCOUNTER — TREATMENT (OUTPATIENT)
Dept: CARDIAC REHAB | Facility: HOSPITAL | Age: 70
End: 2025-03-07
Payer: MEDICARE

## 2025-03-07 DIAGNOSIS — Z95.2 S/P AVR: Primary | ICD-10-CM

## 2025-03-07 PROCEDURE — 93798 PHYS/QHP OP CAR RHAB W/ECG: CPT

## 2025-03-10 ENCOUNTER — TREATMENT (OUTPATIENT)
Dept: CARDIAC REHAB | Facility: HOSPITAL | Age: 70
End: 2025-03-10
Payer: MEDICARE

## 2025-03-10 DIAGNOSIS — Z95.2 S/P AVR: Primary | ICD-10-CM

## 2025-03-10 PROCEDURE — 93798 PHYS/QHP OP CAR RHAB W/ECG: CPT

## 2025-03-11 ENCOUNTER — RESULTS FOLLOW-UP (OUTPATIENT)
Dept: CARDIOLOGY | Facility: HOSPITAL | Age: 70
End: 2025-03-11
Payer: MEDICARE

## 2025-03-11 NOTE — TELEPHONE ENCOUNTER
Called and spoke with the patient. Informing the patient of the message commented below. Patient acknowledged this information, and stated that she will bring in a record of her BP reading next week for Dr. Driscoll to look over.     ----- Message from Makayla Driscoll sent at 3/10/2025  4:54 PM EDT -----  Less pulse the blood pressure for if we need to we can make adjustments in the medications based on the readings in the next few days  Okay to go to the trip in [place] in April  ----- Message -----  From: Debra Patterson MA  Sent: 3/10/2025  10:42 AM EDT  To: Makayla Driscoll MD    Called and spoke with the patient. Informing the patient of her results. Patient acknowledged this information. Patient is wanting to get cleared by you. For her to drive to [place] sometime in   April. Patient also stated that her BP at rehab was a little bit lower. Patient stated that you wanted to know that.   ----- Message -----  From: Makayla Driscoll MD  Sent: 3/6/2025  12:57 PM EDT  To: Debra Patterson MA    Echocardiogram looks good  Normalization of LV systolic function  Normal functioning of the bioprosthetic valve/aortic valve  Continue the same therapy and follow-up as scheduled  ----- Message -----  From: Makayla Driscoll MD  Sent: 3/6/2025  12:56 PM EST  To: Makayla Driscoll MD

## 2025-03-12 ENCOUNTER — TREATMENT (OUTPATIENT)
Dept: CARDIAC REHAB | Facility: HOSPITAL | Age: 70
End: 2025-03-12
Payer: MEDICARE

## 2025-03-12 DIAGNOSIS — Z95.2 S/P AVR: Primary | ICD-10-CM

## 2025-03-12 PROCEDURE — 93798 PHYS/QHP OP CAR RHAB W/ECG: CPT

## 2025-03-13 ENCOUNTER — TELEPHONE (OUTPATIENT)
Dept: CARDIOLOGY | Facility: CLINIC | Age: 70
End: 2025-03-13

## 2025-03-13 NOTE — TELEPHONE ENCOUNTER
Caller: Rachael Rodriguez    Relationship: Self    Best call back number: [phone]    Which medication are you concerned about: LOSARTAN 25 MG MEDICATION AND METOPROLOL SUCCINATE 25 MG MEDICATION    Who prescribed you this medication: DR. CORTEZ    When did you start taking this medication: ABOUT WEEK AND HALF    What are your concerns: PATIENT STATED THAT SHE IS NOT URINATING VERY MUCH AND SHE IS CONSTIPATED AND SHE THINKS IT IS THE MEDICATION THAT IS CAUSING IT. PATIENT STATED THAT SHE IS TAKING STOOL SOFTENER AND MIRALAX AND IT IS NOT HELPING AT ALL. PLEASE CONTACT PATIENT TO ADVISE. THANK YOU.    How long have you had these concerns: 03.10.25

## 2025-03-13 NOTE — TELEPHONE ENCOUNTER
Called and spoke with the patient. Informing the patient of the message commented below from Dr. Driscoll. Patient acknowledged this information.

## 2025-03-14 ENCOUNTER — TREATMENT (OUTPATIENT)
Dept: CARDIAC REHAB | Facility: HOSPITAL | Age: 70
End: 2025-03-14
Payer: MEDICARE

## 2025-03-14 DIAGNOSIS — Z95.2 S/P AVR: Primary | ICD-10-CM

## 2025-03-14 PROCEDURE — 93798 PHYS/QHP OP CAR RHAB W/ECG: CPT

## 2025-03-17 ENCOUNTER — TREATMENT (OUTPATIENT)
Dept: CARDIAC REHAB | Facility: HOSPITAL | Age: 70
End: 2025-03-17
Payer: MEDICARE

## 2025-03-17 DIAGNOSIS — Z95.2 S/P AVR: Primary | ICD-10-CM

## 2025-03-17 PROCEDURE — 93798 PHYS/QHP OP CAR RHAB W/ECG: CPT

## 2025-03-19 ENCOUNTER — TREATMENT (OUTPATIENT)
Dept: CARDIAC REHAB | Facility: HOSPITAL | Age: 70
End: 2025-03-19
Payer: MEDICARE

## 2025-03-19 DIAGNOSIS — Z95.2 S/P AVR: Primary | ICD-10-CM

## 2025-03-19 PROCEDURE — 93798 PHYS/QHP OP CAR RHAB W/ECG: CPT

## 2025-03-21 ENCOUNTER — TREATMENT (OUTPATIENT)
Dept: CARDIAC REHAB | Facility: HOSPITAL | Age: 70
End: 2025-03-21
Payer: MEDICARE

## 2025-03-21 DIAGNOSIS — Z95.2 S/P AVR: Primary | ICD-10-CM

## 2025-03-21 PROCEDURE — 93798 PHYS/QHP OP CAR RHAB W/ECG: CPT

## 2025-03-24 ENCOUNTER — TREATMENT (OUTPATIENT)
Dept: CARDIAC REHAB | Facility: HOSPITAL | Age: 70
End: 2025-03-24
Payer: MEDICARE

## 2025-03-24 DIAGNOSIS — Z95.2 S/P AVR: Primary | ICD-10-CM

## 2025-03-24 PROCEDURE — 93798 PHYS/QHP OP CAR RHAB W/ECG: CPT

## 2025-03-26 ENCOUNTER — TELEPHONE (OUTPATIENT)
Dept: CARDIOLOGY | Facility: CLINIC | Age: 70
End: 2025-03-26

## 2025-03-26 ENCOUNTER — TREATMENT (OUTPATIENT)
Dept: CARDIAC REHAB | Facility: HOSPITAL | Age: 70
End: 2025-03-26
Payer: MEDICARE

## 2025-03-26 DIAGNOSIS — Z95.2 S/P AVR: Primary | ICD-10-CM

## 2025-03-26 PROCEDURE — 93798 PHYS/QHP OP CAR RHAB W/ECG: CPT

## 2025-03-26 NOTE — TELEPHONE ENCOUNTER
Caller: Rachael Rodriguez    Relationship: Self    Best call back number: [phone]      PATIENT WENT TO REHAB TODAY AND HER BP WAS ELEVATED /93 RESTING, MAX /102 - AFTER EXERCISE IT /93 THEN WENT /90  SHE ALSO EXPERIENCED A POKING FEELING IN HER CHEST, WANT SURE WHAT THAT SHOULD BE.

## 2025-03-27 DIAGNOSIS — Z95.2 S/P AVR: Primary | ICD-10-CM

## 2025-03-27 DIAGNOSIS — R06.02 SOB (SHORTNESS OF BREATH): ICD-10-CM

## 2025-03-27 RX ORDER — LOSARTAN POTASSIUM 50 MG/1
50 TABLET ORAL DAILY
Qty: 90 TABLET | Refills: 1 | Status: SHIPPED | OUTPATIENT
Start: 2025-03-27

## 2025-03-27 NOTE — TELEPHONE ENCOUNTER
Called and spoke with the patient. Informing the patient of the message commented below from Dr. Driscoll. Patient acknowledged this information, and stated. That she will log her BP for a week if they are still high in a week. Then she will go back on the losartan.

## 2025-03-28 ENCOUNTER — TREATMENT (OUTPATIENT)
Dept: CARDIAC REHAB | Facility: HOSPITAL | Age: 70
End: 2025-03-28
Payer: MEDICARE

## 2025-03-28 ENCOUNTER — HOSPITAL ENCOUNTER (OUTPATIENT)
Dept: GENERAL RADIOLOGY | Facility: HOSPITAL | Age: 70
Discharge: HOME | End: 2025-03-28
Payer: MEDICARE

## 2025-03-28 DIAGNOSIS — Z95.2 S/P AVR: Primary | ICD-10-CM

## 2025-03-28 DIAGNOSIS — Z95.2 S/P AVR: ICD-10-CM

## 2025-03-28 DIAGNOSIS — R06.02 SOB (SHORTNESS OF BREATH): ICD-10-CM

## 2025-03-28 PROCEDURE — 71046 X-RAY EXAM CHEST 2 VIEWS: CPT

## 2025-03-28 PROCEDURE — 93798 PHYS/QHP OP CAR RHAB W/ECG: CPT

## 2025-03-31 ENCOUNTER — TREATMENT (OUTPATIENT)
Dept: CARDIAC REHAB | Facility: HOSPITAL | Age: 70
End: 2025-03-31
Payer: MEDICARE

## 2025-03-31 DIAGNOSIS — Z95.2 S/P AVR: Primary | ICD-10-CM

## 2025-03-31 PROCEDURE — 93798 PHYS/QHP OP CAR RHAB W/ECG: CPT

## 2025-04-01 ENCOUNTER — RESULTS FOLLOW-UP (OUTPATIENT)
Dept: CARDIOLOGY | Facility: CLINIC | Age: 70
End: 2025-04-01
Payer: MEDICARE

## 2025-04-01 NOTE — TELEPHONE ENCOUNTER
Called and spoke with the patient. Informing the patient of her chest XR results commented below. Patient acknowledged this information.     ----- Message from Makayla Driscoll sent at 3/31/2025  5:37 PM EDT -----  Chest x-ray looks normal except for scoliosis  ----- Message -----  From: Doug, Rad Results [place] In  Sent: 3/31/2025   3:52 PM EDT  To: Makayla Driscoll MD

## 2025-04-02 ENCOUNTER — TREATMENT (OUTPATIENT)
Dept: CARDIAC REHAB | Facility: HOSPITAL | Age: 70
End: 2025-04-02
Payer: MEDICARE

## 2025-04-02 DIAGNOSIS — Z95.2 S/P AVR: Primary | ICD-10-CM

## 2025-04-02 PROCEDURE — 93798 PHYS/QHP OP CAR RHAB W/ECG: CPT

## 2025-04-04 ENCOUNTER — APPOINTMENT (OUTPATIENT)
Dept: CARDIAC REHAB | Facility: HOSPITAL | Age: 70
End: 2025-04-04
Payer: MEDICARE

## 2025-04-07 ENCOUNTER — TELEPHONE (OUTPATIENT)
Dept: CARDIOLOGY | Facility: CLINIC | Age: 70
End: 2025-04-07

## 2025-04-07 ENCOUNTER — TREATMENT (OUTPATIENT)
Dept: CARDIAC REHAB | Facility: HOSPITAL | Age: 70
End: 2025-04-07
Payer: MEDICARE

## 2025-04-07 DIAGNOSIS — Z95.2 S/P AVR: Primary | ICD-10-CM

## 2025-04-07 PROCEDURE — 93798 PHYS/QHP OP CAR RHAB W/ECG: CPT

## 2025-04-07 RX ORDER — AMLODIPINE BESYLATE 5 MG/1
5 TABLET ORAL DAILY
Qty: 90 TABLET | Refills: 3 | Status: SHIPPED | OUTPATIENT
Start: 2025-04-07

## 2025-04-07 NOTE — TELEPHONE ENCOUNTER
Caller: Rachael Rodriguez    Relationship: Self    Best call back number: [phone]     What was the call regarding: PT WAS AT REHAB TODAY AND HER BP  ON BOTTOM AND PT WAS SENT HOME FROM REHAB AND WAS UNABLE TO COMPLETE ANYTHING - PT DOES NOTE SOME SYMPTOMS ASSOCIATED WITH THE HIGH BP, SHE DOES NOTE SHE LIFTED A HEAVY OBJECT THAT MAY HAVE AFFECTED IT - PT IS ASKING FOR A CALL BACK TO DISCUSS AS SHE HAS BEEN HAVING THIS ISSUE FOR 3 WEEKS - SHE NOTES SOME SHAKES AS WELL - PLEASE ADVISE      PREFERRED PHARMACY :     Pharmacy where request should be sent: [facility] [id number] - [address] [facility] [address] - [phone] [facility] [phone] FX

## 2025-04-09 ENCOUNTER — APPOINTMENT (OUTPATIENT)
Dept: CARDIAC REHAB | Facility: HOSPITAL | Age: 70
End: 2025-04-09
Payer: MEDICARE

## 2025-04-11 ENCOUNTER — APPOINTMENT (OUTPATIENT)
Dept: CARDIAC REHAB | Facility: HOSPITAL | Age: 70
End: 2025-04-11
Payer: MEDICARE

## 2025-04-14 ENCOUNTER — APPOINTMENT (OUTPATIENT)
Dept: CARDIAC REHAB | Facility: HOSPITAL | Age: 70
End: 2025-04-14
Payer: MEDICARE

## 2025-04-14 RX ORDER — METOPROLOL TARTRATE 25 MG/1
TABLET, FILM COATED ORAL
Qty: 90 TABLET | Refills: 0 | OUTPATIENT
Start: 2025-04-14

## 2025-04-16 ENCOUNTER — APPOINTMENT (OUTPATIENT)
Dept: CARDIAC REHAB | Facility: HOSPITAL | Age: 70
End: 2025-04-16
Payer: MEDICARE

## 2025-04-18 ENCOUNTER — APPOINTMENT (OUTPATIENT)
Dept: CARDIAC REHAB | Facility: HOSPITAL | Age: 70
End: 2025-04-18
Payer: MEDICARE

## 2025-04-21 ENCOUNTER — TREATMENT (OUTPATIENT)
Dept: CARDIAC REHAB | Facility: HOSPITAL | Age: 70
End: 2025-04-21
Payer: MEDICARE

## 2025-04-21 DIAGNOSIS — Z95.2 S/P AVR: Primary | ICD-10-CM

## 2025-04-21 PROCEDURE — 93798 PHYS/QHP OP CAR RHAB W/ECG: CPT

## 2025-04-23 ENCOUNTER — TREATMENT (OUTPATIENT)
Dept: CARDIAC REHAB | Facility: HOSPITAL | Age: 70
End: 2025-04-23
Payer: MEDICARE

## 2025-04-23 DIAGNOSIS — Z95.2 S/P AVR: Primary | ICD-10-CM

## 2025-04-23 PROCEDURE — 93798 PHYS/QHP OP CAR RHAB W/ECG: CPT

## 2025-04-25 ENCOUNTER — TREATMENT (OUTPATIENT)
Dept: CARDIAC REHAB | Facility: HOSPITAL | Age: 70
End: 2025-04-25
Payer: MEDICARE

## 2025-04-25 DIAGNOSIS — Z95.2 S/P AVR: Primary | ICD-10-CM

## 2025-04-25 PROCEDURE — 93798 PHYS/QHP OP CAR RHAB W/ECG: CPT

## 2025-04-28 ENCOUNTER — TREATMENT (OUTPATIENT)
Dept: CARDIAC REHAB | Facility: HOSPITAL | Age: 70
End: 2025-04-28
Payer: MEDICARE

## 2025-04-28 DIAGNOSIS — Z95.2 S/P AVR: Primary | ICD-10-CM

## 2025-04-28 PROCEDURE — 93798 PHYS/QHP OP CAR RHAB W/ECG: CPT

## 2025-04-30 ENCOUNTER — APPOINTMENT (OUTPATIENT)
Dept: CARDIAC REHAB | Facility: HOSPITAL | Age: 70
End: 2025-04-30
Payer: MEDICARE

## 2025-05-01 ENCOUNTER — TREATMENT (OUTPATIENT)
Dept: CARDIAC REHAB | Facility: HOSPITAL | Age: 70
End: 2025-05-01
Payer: MEDICARE

## 2025-05-01 DIAGNOSIS — Z95.2 S/P AVR: Primary | ICD-10-CM

## 2025-05-01 PROCEDURE — 93798 PHYS/QHP OP CAR RHAB W/ECG: CPT

## 2025-05-02 ENCOUNTER — TREATMENT (OUTPATIENT)
Dept: CARDIAC REHAB | Facility: HOSPITAL | Age: 70
End: 2025-05-02
Payer: MEDICARE

## 2025-05-02 DIAGNOSIS — Z95.2 S/P AVR: Primary | ICD-10-CM

## 2025-05-02 PROCEDURE — 93798 PHYS/QHP OP CAR RHAB W/ECG: CPT

## 2025-05-02 RX ORDER — METOPROLOL SUCCINATE 25 MG/1
25 TABLET, EXTENDED RELEASE ORAL 2 TIMES DAILY
Qty: 60 TABLET | Refills: 1 | Status: SHIPPED | OUTPATIENT
Start: 2025-05-02

## 2025-05-05 ENCOUNTER — TREATMENT (OUTPATIENT)
Dept: CARDIAC REHAB | Facility: HOSPITAL | Age: 70
End: 2025-05-05
Payer: MEDICARE

## 2025-05-05 DIAGNOSIS — Z95.2 S/P AVR: Primary | ICD-10-CM

## 2025-05-05 PROCEDURE — 93798 PHYS/QHP OP CAR RHAB W/ECG: CPT

## 2025-05-07 ENCOUNTER — TREATMENT (OUTPATIENT)
Dept: CARDIAC REHAB | Facility: HOSPITAL | Age: 70
End: 2025-05-07
Payer: MEDICARE

## 2025-05-07 DIAGNOSIS — Z95.2 S/P AVR: Primary | ICD-10-CM

## 2025-05-07 PROCEDURE — 93798 PHYS/QHP OP CAR RHAB W/ECG: CPT

## 2025-05-09 ENCOUNTER — TREATMENT (OUTPATIENT)
Dept: CARDIAC REHAB | Facility: HOSPITAL | Age: 70
End: 2025-05-09
Payer: MEDICARE

## 2025-05-09 DIAGNOSIS — Z95.2 S/P AVR: Primary | ICD-10-CM

## 2025-05-09 PROCEDURE — 93798 PHYS/QHP OP CAR RHAB W/ECG: CPT

## 2025-05-12 ENCOUNTER — TREATMENT (OUTPATIENT)
Dept: CARDIAC REHAB | Facility: HOSPITAL | Age: 70
End: 2025-05-12
Payer: MEDICARE

## 2025-05-12 DIAGNOSIS — Z95.2 S/P AVR: Primary | ICD-10-CM

## 2025-05-12 PROCEDURE — 93798 PHYS/QHP OP CAR RHAB W/ECG: CPT

## 2025-05-14 ENCOUNTER — TREATMENT (OUTPATIENT)
Dept: CARDIAC REHAB | Facility: HOSPITAL | Age: 70
End: 2025-05-14
Payer: MEDICARE

## 2025-05-14 DIAGNOSIS — Z95.2 S/P AVR: Primary | ICD-10-CM

## 2025-05-14 PROCEDURE — 93798 PHYS/QHP OP CAR RHAB W/ECG: CPT

## 2025-05-16 ENCOUNTER — TREATMENT (OUTPATIENT)
Dept: CARDIAC REHAB | Facility: HOSPITAL | Age: 70
End: 2025-05-16
Payer: MEDICARE

## 2025-05-16 DIAGNOSIS — Z95.2 S/P AVR: Primary | ICD-10-CM

## 2025-05-16 PROCEDURE — 93798 PHYS/QHP OP CAR RHAB W/ECG: CPT

## 2025-05-19 ENCOUNTER — TREATMENT (OUTPATIENT)
Dept: CARDIAC REHAB | Facility: HOSPITAL | Age: 70
End: 2025-05-19
Payer: MEDICARE

## 2025-05-19 DIAGNOSIS — Z95.2 S/P AVR: Primary | ICD-10-CM

## 2025-05-19 PROCEDURE — 93798 PHYS/QHP OP CAR RHAB W/ECG: CPT

## 2025-05-21 ENCOUNTER — TREATMENT (OUTPATIENT)
Dept: CARDIAC REHAB | Facility: HOSPITAL | Age: 70
End: 2025-05-21
Payer: MEDICARE

## 2025-05-21 DIAGNOSIS — Z95.2 S/P AVR: Primary | ICD-10-CM

## 2025-05-21 PROCEDURE — 93798 PHYS/QHP OP CAR RHAB W/ECG: CPT

## 2025-05-23 ENCOUNTER — TREATMENT (OUTPATIENT)
Dept: CARDIAC REHAB | Facility: HOSPITAL | Age: 70
End: 2025-05-23
Payer: MEDICARE

## 2025-05-23 DIAGNOSIS — Z95.2 S/P AVR: Primary | ICD-10-CM

## 2025-05-23 PROCEDURE — 93798 PHYS/QHP OP CAR RHAB W/ECG: CPT

## 2025-05-28 ENCOUNTER — TREATMENT (OUTPATIENT)
Dept: CARDIAC REHAB | Facility: HOSPITAL | Age: 70
End: 2025-05-28
Payer: MEDICARE

## 2025-05-28 DIAGNOSIS — Z95.2 S/P AVR: Primary | ICD-10-CM

## 2025-05-28 PROCEDURE — 93798 PHYS/QHP OP CAR RHAB W/ECG: CPT

## 2025-05-30 ENCOUNTER — APPOINTMENT (OUTPATIENT)
Dept: CARDIAC REHAB | Facility: HOSPITAL | Age: 70
End: 2025-05-30
Payer: MEDICARE

## 2025-06-02 ENCOUNTER — TREATMENT (OUTPATIENT)
Dept: CARDIAC REHAB | Facility: HOSPITAL | Age: 70
End: 2025-06-02
Payer: MEDICARE

## 2025-06-02 DIAGNOSIS — Z95.2 S/P AVR: Primary | ICD-10-CM

## 2025-06-02 PROCEDURE — 93798 PHYS/QHP OP CAR RHAB W/ECG: CPT

## 2025-06-04 ENCOUNTER — TREATMENT (OUTPATIENT)
Dept: CARDIAC REHAB | Facility: HOSPITAL | Age: 70
End: 2025-06-04
Payer: MEDICARE

## 2025-06-04 DIAGNOSIS — Z95.2 S/P AVR: Primary | ICD-10-CM

## 2025-06-04 PROCEDURE — 93798 PHYS/QHP OP CAR RHAB W/ECG: CPT

## 2025-06-06 ENCOUNTER — TREATMENT (OUTPATIENT)
Dept: CARDIAC REHAB | Facility: HOSPITAL | Age: 70
End: 2025-06-06
Payer: MEDICARE

## 2025-06-06 DIAGNOSIS — Z95.2 S/P AVR: Primary | ICD-10-CM

## 2025-06-06 PROCEDURE — 93798 PHYS/QHP OP CAR RHAB W/ECG: CPT

## 2025-06-09 ENCOUNTER — TREATMENT (OUTPATIENT)
Dept: CARDIAC REHAB | Facility: HOSPITAL | Age: 70
End: 2025-06-09
Payer: MEDICARE

## 2025-06-09 DIAGNOSIS — Z95.2 S/P AVR: Primary | ICD-10-CM

## 2025-06-09 PROCEDURE — 93798 PHYS/QHP OP CAR RHAB W/ECG: CPT

## 2025-06-11 ENCOUNTER — TREATMENT (OUTPATIENT)
Dept: CARDIAC REHAB | Facility: HOSPITAL | Age: 70
End: 2025-06-11
Payer: MEDICARE

## 2025-06-11 DIAGNOSIS — Z95.2 S/P AVR: Primary | ICD-10-CM

## 2025-06-11 PROCEDURE — 93798 PHYS/QHP OP CAR RHAB W/ECG: CPT

## 2025-06-13 ENCOUNTER — TREATMENT (OUTPATIENT)
Dept: CARDIAC REHAB | Facility: HOSPITAL | Age: 70
End: 2025-06-13
Payer: MEDICARE

## 2025-06-13 DIAGNOSIS — Z95.2 S/P AVR: Primary | ICD-10-CM

## 2025-06-13 PROCEDURE — 93798 PHYS/QHP OP CAR RHAB W/ECG: CPT

## 2025-06-13 NOTE — PROGRESS NOTES
Final session. Completed program. Plans to continue with exercise at home. See scanned session report. HARSHAL Michelle

## 2025-06-30 RX ORDER — METOPROLOL SUCCINATE 25 MG/1
25 TABLET, EXTENDED RELEASE ORAL 2 TIMES DAILY
Qty: 60 TABLET | Refills: 1 | Status: SHIPPED | OUTPATIENT
Start: 2025-06-30

## 2025-07-11 ENCOUNTER — OFFICE VISIT (OUTPATIENT)
Dept: CARDIOLOGY | Facility: CLINIC | Age: 70
End: 2025-07-11
Payer: MEDICARE

## 2025-07-11 VITALS
HEART RATE: 77 BPM | WEIGHT: 143 LBS | BODY MASS INDEX: 30.02 KG/M2 | DIASTOLIC BLOOD PRESSURE: 80 MMHG | OXYGEN SATURATION: 98 % | SYSTOLIC BLOOD PRESSURE: 158 MMHG | HEIGHT: 58 IN

## 2025-07-11 DIAGNOSIS — Z95.2 S/P AVR: Primary | ICD-10-CM

## 2025-07-11 DIAGNOSIS — I35.0 NONRHEUMATIC AORTIC VALVE STENOSIS: ICD-10-CM

## 2025-07-11 DIAGNOSIS — I35.0 NONRHEUMATIC AORTIC (VALVE) STENOSIS: ICD-10-CM

## 2025-07-11 DIAGNOSIS — I44.7 LBBB (LEFT BUNDLE BRANCH BLOCK): ICD-10-CM

## 2025-07-11 RX ORDER — EZETIMIBE 10 MG/1
10 TABLET ORAL DAILY
Qty: 90 TABLET | Refills: 3 | Status: SHIPPED | OUTPATIENT
Start: 2025-07-11

## 2025-07-11 NOTE — PROGRESS NOTES
Cardiology Office Visit      Encounter Date:  2025    PATIENT IDENTIFICATION    Name: Rachael Rodriguez  Age: 70 y.o. Sex: female : 1955  MRN: [id number]    Reason For Followup:  Nonrheumatic valvular aortic stenosis    Brief Clinical History:  Dear Dr. Cardona, Alvaro Farias MD    I had the pleasure of seeing Rachael Rodriguez today. As you are well aware, this is a 70 y.o. female  with known history of severe aortic valve stenosis status post aortic valve replacement with [employer] pericardial prosthesis 27 mm and left atrial appendage closure with atrial clip on 2025.  Heart cath prior to surgery with no evidence of occlusive coronary disease.  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  History of hypertension, dyslipidemia, moderate to severe pulmonary hypertension, mild mitral valve insufficiency.  Patient is here for follow-up          Interval History:  Denies any new complaints  Home blood pressure readings at optimal blood patient  She does not like to take a pharmacological therapy and try to work with her natural remedies for most part  Complaining of some low back pain  No heart failure symptoms  No dizziness or syncope  No orthopnea no PND  No palpitations      Assessment & Plan    Impressions:  Severe nonrheumatic valvular aortic stenosis status post arctic valve replacement surgery  S/p elective tissue AVR with a left atrial appendage ligation with atrial clip procedure  Hypertension  Hyperlipidemia  Diastolic dysfunction  Paroxysmal/postop atrial fibrillation currently in sinus rhythm  Need for antibiotic prophylaxis for dental procedures reviewed and discussed patient  Risk benefits and alternatives reviewed and discussed with patient  Diagnosis and treatment options reviewed and discussed with patient  Recent labs and workup reviewed and discussed with patient    Hypertension  Patient is intolerant to multiple medications  Patient is currently on a  "low-dose beta-blockers  Patient stopped taking amlodipine secondary to significant swelling involving the legs and also arms  Patient stopped taking losartan because she had some GI side effects  Patient is advised to consider maybe restarting a low-dose amlodipine 82.5 mg but she prefers to wait at this time in future if there is higher home blood pressure readings consider adding diuretic therapy    Valvular aortic stenosis nonrheumatic status post aortic valve replacement surgery with a bioprosthetic valve  Recent echocardiogram with a normal functioning of the bioprosthetic valve with normal gradients  Need for antibiotic prophylaxis for dental procedures reviewed and discussed with patient  Continue current medical therapy continue aggressive risk factor modification    Hyperlipidemia  Patient is intolerant to statins  Plan to start patient on Zetia 10 mg p.o. once a day and if there is no improvement in the lipids consider PCSK9 inhibitors in future    Follow-up in office in 6 months          Diagnoses and all orders for this visit:    1. S/P tissue AVR with left atrial appendage ligation per Dr. Mcbride 1/14/2025 (Primary)    2. Nonrheumatic aortic valve stenosis    3. Nonrheumatic aortic (valve) stenosis    4. LBBB (left bundle branch block)    Other orders  -     ezetimibe (ZETIA) 10 MG tablet; Take 1 tablet by mouth Daily.  Dispense: 90 tablet; Refill: 3          Objective:    Vitals:  Vitals:    07/11/25 1152   BP: 158/80   Pulse: 77   SpO2: 98%   Weight: 64.9 kg (143 lb)   Height: 146.1 cm (57.5\")     Body mass index is 30.41 kg/m².      Physical Exam:    General: Alert, cooperative, no distress, appears stated age  Head:  Normocephalic, atraumatic, mucous membranes moist  Eyes:  Conjunctiva/corneas clear, EOM's intact     Neck:  Supple,  no bruit    Lungs: Clear to auscultation bilaterally, no wheezes rhonchi rales are noted  Chest wall: No tenderness  Heart::  Regular rate and rhythm, S1 and S2 normal, " no murmur, rub or gallop  Abdomen: Soft, non-tender, nondistended bowel sounds active  Extremities: No cyanosis, clubbing, or edema  Pulses: 2+ and symmetric all extremities  Skin:  No rashes or lesions  Neuro/psych: A&O x3. CN II through XII are grossly intact with appropriate affect      Allergies:  Allergies   Allergen Reactions    Statins Myalgia    Sulfa Antibiotics GI Intolerance    Penicillins Rash       Medication Review:     Current Outpatient Medications:     acetaminophen (TYLENOL) 325 MG tablet, Take 2 tablets by mouth Every 6 (Six) Hours As Needed for Mild Pain or Moderate Pain., Disp: 30 tablet, Rfl: 0    metoprolol succinate XL (TOPROL-XL) 25 MG 24 hr tablet, TAKE 1 TABLET BY MOUTH 2 TIMES A DAY (Patient taking differently: Take 0.5 tablets by mouth 2 (Two) Times a Day.), Disp: 60 tablet, Rfl: 1    NON FORMULARY, CardiacRelax AO CompleteSupplement, Disp: , Rfl:     ezetimibe (ZETIA) 10 MG tablet, Take 1 tablet by mouth Daily., Disp: 90 tablet, Rfl: 3  No current facility-administered medications for this visit.    Facility-Administered Medications Ordered in Other Visits:     Chlorhexidine Gluconate Cloth 2 % pads 1 Application, 1 Application, Topical, Q12H PRN, Bakari Solomon, JOEL    Family History:  Family History   Problem Relation Age of Onset    Heart attack Father     Atrial fibrillation Father        Past Medical History:  Past Medical History:   Diagnosis Date    Heart murmur     For years dont know how many    Hypertension        Past Surgical History:  Past Surgical History:   Procedure Laterality Date    AORTIC VALVE REPAIR/REPLACEMENT N/A 1/14/2025    Procedure: AORTIC VALVE REPLACEMENT with 23mm Avalus Valve;  Surgeon: Zac Mcbride MD;  Location: [facility] CVOR;  Service: Cardiothoracic;  Laterality: N/A;    CARDIAC CATHETERIZATION Right 12/30/2024    Procedure: Right and Left Heart Cath;  Surgeon: Makayla Driscoll MD;  Location: [facility] CATH INVASIVE LOCATION;  Service:  Cardiovascular;  Laterality: Right;  Local and IV sedation    TRANSESOPHAGEAL ECHOCARDIOGRAM (BENJAMIN) N/A 1/14/2025    Procedure: TRANSESOPHAGEAL ECHOCARDIOGRAM WITH ANESTHESIA;  Surgeon: Zac Mcbride MD;  Location: [facility];  Service: Cardiothoracic;  Laterality: N/A;       Social History:  Social History     Socioeconomic History    Marital status:    Tobacco Use    Smoking status: Never     Passive exposure: Never    Smokeless tobacco: Never   Vaping Use    Vaping status: Never Used   Substance and Sexual Activity    Alcohol use: Never    Drug use: Never    Sexual activity: Not Currently     Partners: Female       Review of Systems:  The following systems were reviewed as they relate to the cardiovascular system: Constitutional, Eyes, ENT, Cardiovascular, Respiratory, Gastrointestinal, Integumentary, Neurological, Psychiatric, Hematologic, Endocrine, Musculoskeletal, and Genitourinary. The pertinent cardiovascular findings are reported above with all other cardiovascular points within those systems being negative.    Diagnostic Study Review:     Current Electrocardiogram:  Procedures    Laboratory Data:  Lab Results   Component Value Date    GLUCOSE 108 (H) 02/07/2025    BUN 15 02/07/2025    CREATININE 0.60 02/07/2025    BCR 25.0 02/07/2025    K 3.7 02/07/2025    CO2 23.2 02/07/2025    CALCIUM 9.3 02/07/2025    ALBUMIN 3.9 02/06/2025    AST 31 02/06/2025    ALT 14 02/06/2025     Lab Results   Component Value Date    GLUCOSE 108 (H) 02/07/2025    CALCIUM 9.3 02/07/2025     02/07/2025    K 3.7 02/07/2025    CO2 23.2 02/07/2025     02/07/2025    BUN 15 02/07/2025    CREATININE 0.60 02/07/2025    BCR 25.0 02/07/2025    ANIONGAP 11.8 02/07/2025     Lab Results   Component Value Date    WBC 5.37 02/07/2025    HGB 11.3 (L) 02/07/2025    HCT 35.7 02/07/2025    MCV 82.6 02/07/2025     02/07/2025     Lab Results   Component Value Date    CHOL 254 (H) 01/13/2025    TRIG 114 01/13/2025    HDL  78 (H) 01/13/2025     (H) 01/13/2025     Lab Results   Component Value Date    HGBA1C 5.49 01/13/2025     Lab Results   Component Value Date    INR 1.10 02/06/2025    INR 1.38 (H) 01/15/2025    INR 1.53 (H) 01/14/2025    PROTIME 14.2 02/06/2025    PROTIME 17.1 (H) 01/15/2025    PROTIME 18.5 (H) 01/14/2025       Most Recent Echo:  Results for orders placed during the hospital encounter of 03/06/25    Adult Transthoracic Echo Complete W/ Cont if Necessary Per Protocol 03/06/2025 12:56 PM    Interpretation Summary    Left ventricular systolic function is normal. Left ventricular ejection fraction appears to be 51 - 55%.    Left ventricular wall thickness is consistent with mild concentric hypertrophy.    Left ventricular diastolic function is consistent with (grade I) impaired relaxation.    The left atrial cavity is moderately dilated.    There is a bioprosthetic aortic valve present.    Estimated right ventricular systolic pressure from tricuspid regurgitation is normal (<35 mmHg).       Most Recent Stress Test:       Most Recent Cardiac Catheterization:   Results for orders placed during the hospital encounter of 12/28/24    Cardiac Catheterization/Vascular Study    Conclusion  Table formatting from the original result was not included.  Cardiac Catheterization Operative Report    Rachael Rodriguez  [id number]  12/30/2024  @PCP@    She underwent cardiac catheterization.    Indications for the procedure include: Valvular heart disease with severe aortic stenosis.    Results for orders placed during the hospital encounter of 12/28/24    Adult Transthoracic Echo Complete W/ Cont if Necessary Per Protocol    Interpretation Summary    Left ventricular ejection fraction appears to be 46 - 50%.    Left ventricular diastolic function is consistent with (grade II w/high LAP) pseudonormalization.    The left atrial cavity is moderate to severely dilated.    Severe aortic valve stenosis is present.    Moderate mitral  valve regurgitation is present.    Estimated right ventricular systolic pressure from tricuspid regurgitation is moderately elevated (45-55 mmHg).    Moderate to severe pulmonary hypertension is present.    No results found for this or any previous visit.        Procedure Details:  The risks, benefits, complications, treatment options, and expected outcomes were discussed with the patient. The patient and/or family concurred with the proposed plan, giving informed consent.    After informed consent the patient was brought to the cath lab after appropriate IV hydration was begun and oral premedication was given. She was further sedated with fentanyl. She was prepped and draped in the usual manner. Using the modified Seldinger access technique, a 6 Polish sheath was placed in the femoral artery. A left heart catheterization with coronary arteriography was performed. Findings are discussed below.    7 Polish venous sheath was placed in the right femoral vein using ultrasound guidance and then we used a 7 Polish chest tube [place] catheter to the right heart catheterization and hemodynamic pressure measurements.    After the procedure was completed, sedation was stopped and the sheaths and catheters were all removed. Hemostasis was achieved per established hospital protocols.    Conscious sedation:  Conscious sedation was performed according to protocol.  I supervised and directed an independent trained observer with the assistance of monitoring the patient's level of consciousness and physiologic status throughout the procedure.  Intraoperative service time was 90 minutes.    Findings:    Hemodynamics Central aortic pressure systolic 146 diastolic 88 with a mean pressure of 150 mmHg      Right heart catheterization with hemodynamics      Pulmonary capillary wedge pressure was 20 mmHg  PA pressure systolic 45 diastolic 28 with a mean pressure of 36 mmHg  RV pressure systolic 40 mmHg  Right atrial pressure was 8  "mmHg  PA saturation was 73%  Right atrial saturation was of 69%  Aortic saturation was 96%  Silvia equation cardiac output was 4.3 L/min  Silvia equation cardiac index was 2.6 L/min/m²  Pulmonary vascular resistance was 239 dynes per second  Systemic vascular resistance was 2002 dynes per second  Left Main Large-caliber vessel angiographically normal bifurcates into left anterior descending and left circumflex arteries  RCA Large-caliber vessel angiographically normal  LAD Large-caliber vessel angiographically normal  Circ Large-caliber vessel angiographically normal  LV Not done  Coronary Dominance Right coronary artery    Estimated Blood Loss:  Minimal    Specimens: None    Complications:  None; patient tolerated the procedure well.    Disposition: PACU - hemodynamically stable.    Condition: stable    Impressions:  Normal coronaries  Moderate pulmonary hypertension    Recommendations:  Further evaluation for TAVR versus surgical AVR       NOTE: The following portions of the patient's note were reviewed, confirmed and/or updated this visit as appropriate: History of present illness/Interval history, physical examination, assessment & plan, allergies, current medications, past family history, past medical history, past social history, past surgical history and problem list.    Labs pertinent to today's visit on 07/11/2025 (including but not limited to CBC, CMP, and lipid profiles) were requested from the patient's primary care provider/hospital/clinical laboratory.  If the labs were available for the visit, they were reviewed with the patient.  If they were not available, when received, special interest will be made to the labs pertinent to this visit.  The patient's most recent \"in-house\" labs are noted below and have been reviewed.  Outside labs pertinent to this visit are scanned into the record and have been reviewed.    Discussions held today, 07/11/2025,regarding procedures included risk, benefits, and options " including but not limited to: Death, MI, stroke, pain, bleeding, infection, and possible need for vascular/thoracic/cardiothoracic surgery.    Copied information within this note was reviewed and is current as of 07/11/2025.    Assessment and plan noted herein represents the current plan of care as of 07/11/2025.    Significant resources from our office and staff are inherent in engaging this patient in a continuous and active collaborative plan of care related to their chronic cardiovascular conditions outlined herein.  The management of these conditions requires the direction of our service with specialized clinical knowledge, skills, and experience.  This collaborative care includes but is not limited to patient education, expectations and responsibilities, shared decision making around therapeutic goals, and shared commitments to achieve those goals.

## 2025-07-18 ENCOUNTER — TELEPHONE (OUTPATIENT)
Dept: CARDIOLOGY | Facility: CLINIC | Age: 70
End: 2025-07-18
Payer: MEDICARE

## 2025-07-18 NOTE — TELEPHONE ENCOUNTER
Hub staff attempted to follow warm transfer process and was unsuccessful     Caller: Rachael Rodriguez    Relationship to patient: Self    Best call back number:  [phone]    Patient is needing: PATIENT TOOK HER FIRST DOSAGE OF EZETIMIBE THIS MORNING  WITH BREAKFAST AND A LITTLE AFTER HER TONGUE STARTED FEELING TINGLY PUFFY . SHE SAID ITS GOTTEN BETTER NOW.

## 2025-07-18 NOTE — TELEPHONE ENCOUNTER
Notified pt.     Discontinue Zetia for now  I will talk to her during next office visit about the further treatment options for high cholesterol  Makayla Driscoll MD to Me         7/18/25 11:31 AM  Okay to discontinue Zetia for now  I can talk to her about alternatives during next office visit  Me to Makayla Driscoll MD         7/18/25 10:51 AM     Hub staff attempted to follow warm transfer process and was unsuccessful      Caller: Rachael Rodriguez     Relationship to patient: Self     Best call back number:  [phone]     Patient is needing: PATIENT TOOK HER FIRST DOSAGE OF EZETIMIBE THIS MORNING  WITH BREAKFAST AND A LITTLE AFTER HER TONGUE STARTED FEELING TINGLY PUFFY . SHE SAID ITS GOTTEN BETTER NOW.

## 2025-07-24 ENCOUNTER — EXTERNAL PBMM DATA (OUTPATIENT)
Dept: PHARMACY | Facility: OTHER | Age: 70
End: 2025-07-24
Payer: MEDICARE

## 2025-08-07 ENCOUNTER — EXTERNAL PBMM DATA (OUTPATIENT)
Dept: PHARMACY | Facility: OTHER | Age: 70
End: 2025-08-07
Payer: MEDICARE

## 2025-08-11 ENCOUNTER — TELEPHONE (OUTPATIENT)
Dept: CARDIOLOGY | Facility: CLINIC | Age: 70
End: 2025-08-11
Payer: MEDICARE

## 2025-08-12 RX ORDER — AMLODIPINE BESYLATE 2.5 MG/1
2.5 TABLET ORAL DAILY
Qty: 30 TABLET | Refills: 6 | Status: SHIPPED | OUTPATIENT
Start: 2025-08-12

## 2025-08-21 ENCOUNTER — EXTERNAL PBMM DATA (OUTPATIENT)
Dept: PHARMACY | Facility: OTHER | Age: 70
End: 2025-08-21
Payer: MEDICARE

## (undated) DEVICE — PRESSURE MONITORING SET: Brand: TRUWAVE, VAMP PLUS

## (undated) DEVICE — SPONGE,DISSECTOR,ROUND CHERRY,XR,ST,5/PK: Brand: MEDLINE

## (undated) DEVICE — ANTIBACTERIAL UNDYED BRAIDED (POLYGLACTIN 910), SYNTHETIC ABSORBABLE SUTURE: Brand: COATED VICRYL

## (undated) DEVICE — HEMOCONCENTRATOR PERFUS LPS06

## (undated) DEVICE — ST TOURNI COMPL A/ 7IN

## (undated) DEVICE — SUT SILK 2/0 SH CR8 18IN CR8 C012D

## (undated) DEVICE — ST PERFUS M/

## (undated) DEVICE — SUT SILK 4/0 TIES 18IN A183H

## (undated) DEVICE — TUBING, SUCTION, 1/4" X 12', STRAIGHT: Brand: MEDLINE

## (undated) DEVICE — 28 FR RIGHT ANGLE – SOFT PVC CATHETER: Brand: PVC THORACIC CATHETERS

## (undated) DEVICE — SUT PROLN 4/0 V7 36IN 8975H

## (undated) DEVICE — IRRIGATOR BULB ASEPTO 60CC STRL

## (undated) DEVICE — CANN RETRGR STYLET RSCP 15F

## (undated) DEVICE — DRSNG SLVR/ANTIBAC PRIMASEAL POST/OP ADHS 3.5X10IN

## (undated) DEVICE — SUT SILK 2 SUTUPAK TIE 60IN SA8H 2STRAND

## (undated) DEVICE — 500ML,PRESSURE INFUSER W/STOPCOCK: Brand: MEDLINE

## (undated) DEVICE — TPE UMB COTN 1/8X36IN STRL

## (undated) DEVICE — 28 FR STRAIGHT – SOFT PVC CATHETER: Brand: PVC THORACIC CATHETERS

## (undated) DEVICE — PINNACLE INTRODUCER SHEATH: Brand: PINNACLE

## (undated) DEVICE — ELECTRD BLD EZ CLN STD 6.5IN

## (undated) DEVICE — SYS PERFUS SEP PLATLT W TIPS CUST

## (undated) DEVICE — SENSR CERBRL O2 PK/2

## (undated) DEVICE — CANN AORT ROOT DLP VNT/8IN 14G 7F

## (undated) DEVICE — CONNECT Y INTERSEPT W/LL 3/8 X 3/8 X 3/8IN

## (undated) DEVICE — CABL BIPOL W/ALLGTR CLIP/SM 12FT

## (undated) DEVICE — Device

## (undated) DEVICE — SUT PROLN 5/0 RB2 D/A 30IN 8710H

## (undated) DEVICE — SUT PDS2 0 CT1 27IN Z340H MF VIL

## (undated) DEVICE — GLV SURG BIOGEL LTX PF 7 1/2

## (undated) DEVICE — SOL IRR NACL 0.9PCT BO 1000ML

## (undated) DEVICE — DGW .035 FC J3MM 150CM TEF HEP: Brand: EMERALD

## (undated) DEVICE — SOL NACL 0.9PCT 1000ML

## (undated) DEVICE — SUT SILK 2/0 TIES 18IN A185H

## (undated) DEVICE — PRESSURE TUBING: Brand: TRUWAVE

## (undated) DEVICE — PK TRY HEART CATH 50

## (undated) DEVICE — CATH TDILUT SWANGANZ VIP 7.5F 110CM

## (undated) DEVICE — BOOT,SUTURE,STANDARD,YELLOW-IN-BLUE: Brand: MEDLINE

## (undated) DEVICE — CANN ART EOPA 3D NV W/CONN 20F

## (undated) DEVICE — CONTAINER,SPECIMEN,OR STERILE,4OZ: Brand: MEDLINE

## (undated) DEVICE — SUT PROLN 2/0 V5 48IN D9694

## (undated) DEVICE — ELECTRD DEFIB M/FUNC PROPADZ RADIOL 2PK

## (undated) DEVICE — CATH DIAG IMPULSE FL4 6F 100CM

## (undated) DEVICE — TOWEL,OR,DSP,ST,WHITE,DLX,4/PK,20PK/CS: Brand: MEDLINE

## (undated) DEVICE — ST IV BLD W/HANDPUMP YSITE 125IN

## (undated) DEVICE — PK PERFUS CUST W/CARDIOPLEGIA

## (undated) DEVICE — ST ACC MICROPUNCTURE STFF/CANN PLAT/TP 4F 21G 40CM

## (undated) DEVICE — SUT PROLN 4/0 V5 36IN 8935H

## (undated) DEVICE — BG BLD SYS

## (undated) DEVICE — SUT SILK 0 CT1 CR8 18IN C021D

## (undated) DEVICE — PK OPN HEART WHT WRP 50

## (undated) DEVICE — PK ATS CUST W CARDIOTOMY RESEVOIR

## (undated) DEVICE — 3M™ TEGADERM™ I.V. ADVANCED SECUREMENT DRESSING, 1685, 3-1/2 IN X 4-1/2 IN (8.5 CM X 11.5 CM), 50/CT 4CT/CASE: Brand: 3M™ TEGADERM™

## (undated) DEVICE — CATH DIAG IMPULSE FR4 6F 100CM

## (undated) DEVICE — PK PERFUS W/TB CUST ADLT

## (undated) DEVICE — BLOOD TRANSFUSION FILTER: Brand: HAEMONETICS

## (undated) DEVICE — DRAPE SHEET ULTRAGARD: Brand: MEDLINE

## (undated) DEVICE — COUNT NDL MAG XLG

## (undated) DEVICE — KT CATH CV ACC MAC 2L SFTY 9F 4 1/2IN

## (undated) DEVICE — SAFELINER OUTER SHELL SUCTION CANISTER: Brand: DEROYAL

## (undated) DEVICE — SUT PROLN 3/0 V7 D/A 36IN 8976H

## (undated) DEVICE — 3M™ TEGADERM™ IV TRANSPARENT FILM DRESSING WITH BORDER 100 BAGS/CARTON 4 CARTONS/CASE 1633: Brand: 3M™ TEGADERM™

## (undated) DEVICE — BIOPATCH™ ANTIMICROBIAL DRESSING WITH CHLORHEXIDINE GLUCONATE IS A HYDROPHILLIC POLYURETHANE ABSORPTIVE FOAM WITH CHLORHEXIDINE GLUCONATE (CHG) WHICH INHIBITS BACTERIAL GROWTH UNDER THE DRESSING. THE DRESSING IS INTENDED TO BE USED TO ABSORB EXUDATE, COVER A WOUND CAUSED BY VASCULAR AND NONVASCULAR PERCUTANEOUS MEDICAL DEVICES DURING SURGERY, AS WELL AS REDUCE LOCAL INFECTION AND COLONIZATION OF MICROORGANISMS.: Brand: BIOPATCH

## (undated) DEVICE — GAUZE,SPONGE,4"X4",32PLY,XRAY,STRL,LF: Brand: MEDLINE

## (undated) DEVICE — SUP ARMBRD HANDAID ART/LINE 9IN

## (undated) DEVICE — SUPPLIED AS A PAIR FOR USE IN JAWS OF RESUABLE CLAMPS.: Brand: INTRACK® INSERTS

## (undated) DEVICE — ELECTRD DEFIB M/FUNC PROPADZ STRL 2PK

## (undated) DEVICE — CATH DIAG IMPULSE PIG .056 6F 110CM

## (undated) DEVICE — CATH ART RADL 20GA 1 3/4IN LF

## (undated) DEVICE — SOL IRR H2O BO 1000ML STRL

## (undated) DEVICE — SWAN-GANZ TRUE SIZE THERMODILUTION "S" TIP: Brand: SWAN-GANZ TRUE SIZE

## (undated) DEVICE — SUT PROLN 5/0 V5 36IN 8934H